# Patient Record
Sex: FEMALE | Race: WHITE | NOT HISPANIC OR LATINO | URBAN - METROPOLITAN AREA
[De-identification: names, ages, dates, MRNs, and addresses within clinical notes are randomized per-mention and may not be internally consistent; named-entity substitution may affect disease eponyms.]

---

## 2017-01-02 ENCOUNTER — EMERGENCY (EMERGENCY)
Facility: HOSPITAL | Age: 82
LOS: 0 days | Discharge: LEFT BEFORE DISCHARGE | End: 2017-01-02
Admitting: EMERGENCY MEDICINE
Payer: MEDICARE

## 2017-01-02 DIAGNOSIS — I10 ESSENTIAL (PRIMARY) HYPERTENSION: ICD-10-CM

## 2017-01-02 DIAGNOSIS — E78.5 HYPERLIPIDEMIA, UNSPECIFIED: ICD-10-CM

## 2017-01-02 DIAGNOSIS — G50.0 TRIGEMINAL NEURALGIA: ICD-10-CM

## 2017-01-02 PROCEDURE — 99283 EMERGENCY DEPT VISIT LOW MDM: CPT

## 2017-10-03 ENCOUNTER — EMERGENCY (EMERGENCY)
Facility: HOSPITAL | Age: 82
LOS: 0 days | Discharge: HOME | End: 2017-10-03
Attending: EMERGENCY MEDICINE | Admitting: EMERGENCY MEDICINE
Payer: MEDICARE

## 2017-10-03 VITALS
OXYGEN SATURATION: 97 % | DIASTOLIC BLOOD PRESSURE: 107 MMHG | HEART RATE: 71 BPM | TEMPERATURE: 98 F | RESPIRATION RATE: 18 BRPM | SYSTOLIC BLOOD PRESSURE: 202 MMHG | WEIGHT: 115.08 LBS

## 2017-10-03 VITALS — HEART RATE: 67 BPM | DIASTOLIC BLOOD PRESSURE: 91 MMHG | SYSTOLIC BLOOD PRESSURE: 138 MMHG

## 2017-10-03 DIAGNOSIS — Z79.02 LONG TERM (CURRENT) USE OF ANTITHROMBOTICS/ANTIPLATELETS: ICD-10-CM

## 2017-10-03 DIAGNOSIS — Z86.73 PERSONAL HISTORY OF TRANSIENT ISCHEMIC ATTACK (TIA), AND CEREBRAL INFARCTION WITHOUT RESIDUAL DEFICITS: ICD-10-CM

## 2017-10-03 DIAGNOSIS — I10 ESSENTIAL (PRIMARY) HYPERTENSION: ICD-10-CM

## 2017-10-03 DIAGNOSIS — Z88.2 ALLERGY STATUS TO SULFONAMIDES: ICD-10-CM

## 2017-10-03 PROCEDURE — 99284 EMERGENCY DEPT VISIT MOD MDM: CPT

## 2017-10-03 RX ADMIN — Medication 0.1 MILLIGRAM(S): at 15:18

## 2017-10-03 NOTE — ED STATDOCS - PROGRESS NOTE DETAILS
signed Jaycee Del Cid PA-C Pt seen initially in intake by Dr Awad.   Pt sent from dentist for asymptomatic HTN. pt has hx HTN, took her am meds (toprol XL 25 QD, Atacand 8mg BID). Spoke to PMJESIKA Luu whom she saw last week in office, BP was 138 systolic. Dr luu suspects pt may sometimes forget her medications. Will see pt in office tomorrow, give pt 1 dose of meds in ED, then DC. Discussed with pt and daughter, pt feels she remembers to take all of her meds, will f/u with Dr Luu tomorrow.

## 2017-10-03 NOTE — ED ADULT TRIAGE NOTE - CHIEF COMPLAINT QUOTE
Pt BIBA from dentist office for hypertension. Per EMS, pt's BP at dentist was as high as 222/112. Pt denies headache, vomiting, vision changes.
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
## 2017-10-03 NOTE — ED STATDOCS - ATTENDING CONTRIBUTION TO CARE
I, Hesham Awad, performed the initial face to face bedside interview with this patient regarding history of present illness, review of symptoms and relevant past medical, social and family history.  I completed an independent physical examination.  I was the initial provider who evaluated this patient. I have signed out the follow up of any pending tests (i.e. labs, radiological studies) to the ACP.  I have communicated the patient’s plan of care and disposition with the ACP.  The history, relevant review of systems, past medical and surgical history, medical decision making, and physical examination was documented by the scribe in my presence and I attest to the accuracy of the documentation.

## 2017-10-03 NOTE — ED STATDOCS - ENMT, MLM
(+) swelling to right lower gums.  Nasal mucosa clear.  Mouth with normal mucosa  Throat has no vesicles, no oropharyngeal exudates and uvula is midline.

## 2018-03-30 ENCOUNTER — APPOINTMENT (OUTPATIENT)
Dept: NEUROLOGY | Facility: CLINIC | Age: 83
End: 2018-03-30
Payer: MEDICARE

## 2018-03-30 VITALS
DIASTOLIC BLOOD PRESSURE: 74 MMHG | WEIGHT: 115 LBS | HEART RATE: 66 BPM | SYSTOLIC BLOOD PRESSURE: 144 MMHG | HEIGHT: 63 IN | BODY MASS INDEX: 20.38 KG/M2

## 2018-03-30 DIAGNOSIS — Z87.891 PERSONAL HISTORY OF NICOTINE DEPENDENCE: ICD-10-CM

## 2018-03-30 DIAGNOSIS — Z86.39 PERSONAL HISTORY OF OTHER ENDOCRINE, NUTRITIONAL AND METABOLIC DISEASE: ICD-10-CM

## 2018-03-30 DIAGNOSIS — Z87.09 PERSONAL HISTORY OF OTHER DISEASES OF THE RESPIRATORY SYSTEM: ICD-10-CM

## 2018-03-30 DIAGNOSIS — Z80.0 FAMILY HISTORY OF MALIGNANT NEOPLASM OF DIGESTIVE ORGANS: ICD-10-CM

## 2018-03-30 DIAGNOSIS — Z86.79 PERSONAL HISTORY OF OTHER DISEASES OF THE CIRCULATORY SYSTEM: ICD-10-CM

## 2018-03-30 DIAGNOSIS — Z82.0 FAMILY HISTORY OF EPILEPSY AND OTHER DISEASES OF THE NERVOUS SYSTEM: ICD-10-CM

## 2018-03-30 DIAGNOSIS — Z82.49 FAMILY HISTORY OF ISCHEMIC HEART DISEASE AND OTHER DISEASES OF THE CIRCULATORY SYSTEM: ICD-10-CM

## 2018-03-30 PROCEDURE — 99204 OFFICE O/P NEW MOD 45 MIN: CPT

## 2018-03-30 RX ORDER — KETOCONAZOLE 20 MG/G
2 CREAM TOPICAL
Qty: 60 | Refills: 0 | Status: COMPLETED | COMMUNITY
Start: 2018-01-02

## 2018-03-30 RX ORDER — BESIFLOXACIN 6 MG/ML
0.6 SUSPENSION OPHTHALMIC
Qty: 5 | Refills: 0 | Status: COMPLETED | COMMUNITY
Start: 2018-03-12

## 2018-03-30 RX ORDER — ATORVASTATIN CALCIUM 20 MG/1
20 TABLET, FILM COATED ORAL
Qty: 90 | Refills: 0 | Status: COMPLETED | COMMUNITY
Start: 2017-12-30

## 2018-03-30 RX ORDER — DIFLUPREDNATE 0.5 MG/ML
0.05 EMULSION OPHTHALMIC
Qty: 5 | Refills: 0 | Status: COMPLETED | COMMUNITY
Start: 2018-02-26

## 2018-03-30 RX ORDER — AMOXICILLIN AND CLAVULANATE POTASSIUM 875; 125 MG/1; MG/1
875-125 TABLET, COATED ORAL
Qty: 14 | Refills: 0 | Status: COMPLETED | COMMUNITY
Start: 2017-10-03

## 2018-03-30 RX ORDER — LEVOTHYROXINE SODIUM 0.07 MG/1
75 TABLET ORAL
Qty: 90 | Refills: 0 | Status: COMPLETED | COMMUNITY
Start: 2017-11-22

## 2018-04-04 ENCOUNTER — APPOINTMENT (OUTPATIENT)
Dept: NEUROLOGY | Facility: CLINIC | Age: 83
End: 2018-04-04

## 2018-04-05 ENCOUNTER — APPOINTMENT (OUTPATIENT)
Dept: NEUROLOGY | Facility: CLINIC | Age: 83
End: 2018-04-05

## 2018-04-05 ENCOUNTER — APPOINTMENT (OUTPATIENT)
Dept: NEUROLOGY | Facility: CLINIC | Age: 83
End: 2018-04-05
Payer: MEDICARE

## 2018-04-05 PROCEDURE — 95816 EEG AWAKE AND DROWSY: CPT

## 2018-04-13 ENCOUNTER — INPATIENT (INPATIENT)
Facility: HOSPITAL | Age: 83
LOS: 1 days | Discharge: HOME | End: 2018-04-15
Attending: SURGERY | Admitting: SURGERY
Payer: COMMERCIAL

## 2018-04-13 ENCOUNTER — APPOINTMENT (OUTPATIENT)
Dept: NEUROLOGY | Facility: CLINIC | Age: 83
End: 2018-04-13
Payer: MEDICARE

## 2018-04-13 VITALS
HEART RATE: 64 BPM | WEIGHT: 115 LBS | HEIGHT: 63 IN | SYSTOLIC BLOOD PRESSURE: 142 MMHG | BODY MASS INDEX: 20.38 KG/M2 | DIASTOLIC BLOOD PRESSURE: 78 MMHG

## 2018-04-13 VITALS — DIASTOLIC BLOOD PRESSURE: 48 MMHG | SYSTOLIC BLOOD PRESSURE: 86 MMHG | HEART RATE: 72 BPM
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
## 2018-04-13 DIAGNOSIS — R41.0 DISORIENTATION, UNSPECIFIED: ICD-10-CM

## 2018-04-13 DIAGNOSIS — R47.9 UNSPECIFIED SPEECH DISTURBANCES: ICD-10-CM

## 2018-04-13 DIAGNOSIS — G50.0 TRIGEMINAL NEURALGIA: ICD-10-CM

## 2018-04-13 DIAGNOSIS — I10 ESSENTIAL (PRIMARY) HYPERTENSION: ICD-10-CM

## 2018-04-13 DIAGNOSIS — R51 HEADACHE: ICD-10-CM

## 2018-04-13 DIAGNOSIS — S22.22XA FRACTURE OF BODY OF STERNUM, INITIAL ENCOUNTER FOR CLOSED FRACTURE: ICD-10-CM

## 2018-04-13 DIAGNOSIS — R41.3 OTHER AMNESIA: ICD-10-CM

## 2018-04-13 DIAGNOSIS — G45.1 CAROTID ARTERY SYNDROME (HEMISPHERIC): ICD-10-CM

## 2018-04-13 LAB
ALBUMIN SERPL ELPH-MCNC: 3.6 G/DL — SIGNIFICANT CHANGE UP (ref 3.3–5)
ALP SERPL-CCNC: 81 U/L — SIGNIFICANT CHANGE UP (ref 40–120)
ALT FLD-CCNC: 22 U/L — SIGNIFICANT CHANGE UP (ref 12–78)
ANION GAP SERPL CALC-SCNC: 10 MMOL/L — SIGNIFICANT CHANGE UP (ref 5–17)
APTT BLD: 33.1 SEC — SIGNIFICANT CHANGE UP (ref 27.5–37.4)
AST SERPL-CCNC: 18 U/L — SIGNIFICANT CHANGE UP (ref 15–37)
BASOPHILS # BLD AUTO: 0.1 K/UL — SIGNIFICANT CHANGE UP (ref 0–0.2)
BASOPHILS NFR BLD AUTO: 1.1 % — SIGNIFICANT CHANGE UP (ref 0–2)
BILIRUB SERPL-MCNC: 0.4 MG/DL — SIGNIFICANT CHANGE UP (ref 0.2–1.2)
BLD GP AB SCN SERPL QL: SIGNIFICANT CHANGE UP
BUN SERPL-MCNC: 21 MG/DL — SIGNIFICANT CHANGE UP (ref 7–23)
CALCIUM SERPL-MCNC: 8.6 MG/DL — SIGNIFICANT CHANGE UP (ref 8.5–10.1)
CHLORIDE SERPL-SCNC: 89 MMOL/L — LOW (ref 96–108)
CO2 SERPL-SCNC: 29 MMOL/L — SIGNIFICANT CHANGE UP (ref 22–31)
CREAT SERPL-MCNC: 1 MG/DL — SIGNIFICANT CHANGE UP (ref 0.5–1.3)
EOSINOPHIL # BLD AUTO: 0.1 K/UL — SIGNIFICANT CHANGE UP (ref 0–0.5)
EOSINOPHIL NFR BLD AUTO: 1.1 % — SIGNIFICANT CHANGE UP (ref 0–6)
GLUCOSE SERPL-MCNC: 150 MG/DL — HIGH (ref 70–99)
HCT VFR BLD CALC: 35.9 % — SIGNIFICANT CHANGE UP (ref 34.5–45)
HGB BLD-MCNC: 12.4 G/DL — SIGNIFICANT CHANGE UP (ref 11.5–15.5)
IMM GRANULOCYTES NFR BLD AUTO: 1.3 % — SIGNIFICANT CHANGE UP (ref 0–1.5)
INR BLD: 0.96 RATIO — SIGNIFICANT CHANGE UP (ref 0.88–1.16)
LYMPHOCYTES # BLD AUTO: 1.27 K/UL — SIGNIFICANT CHANGE UP (ref 1–3.3)
LYMPHOCYTES # BLD AUTO: 14.1 % — SIGNIFICANT CHANGE UP (ref 13–44)
MCHC RBC-ENTMCNC: 29.9 PG — SIGNIFICANT CHANGE UP (ref 27–34)
MCHC RBC-ENTMCNC: 34.5 GM/DL — SIGNIFICANT CHANGE UP (ref 32–36)
MCV RBC AUTO: 86.5 FL — SIGNIFICANT CHANGE UP (ref 80–100)
MONOCYTES # BLD AUTO: 0.79 K/UL — SIGNIFICANT CHANGE UP (ref 0–0.9)
MONOCYTES NFR BLD AUTO: 8.8 % — SIGNIFICANT CHANGE UP (ref 2–14)
NEUTROPHILS # BLD AUTO: 6.64 K/UL — SIGNIFICANT CHANGE UP (ref 1.8–7.4)
NEUTROPHILS NFR BLD AUTO: 73.6 % — SIGNIFICANT CHANGE UP (ref 43–77)
NRBC # BLD: 0 /100 WBCS — SIGNIFICANT CHANGE UP (ref 0–0)
PLATELET # BLD AUTO: 316 K/UL — SIGNIFICANT CHANGE UP (ref 150–400)
POTASSIUM SERPL-MCNC: 3.2 MMOL/L — LOW (ref 3.5–5.3)
POTASSIUM SERPL-SCNC: 3.2 MMOL/L — LOW (ref 3.5–5.3)
PROT SERPL-MCNC: 6.5 GM/DL — SIGNIFICANT CHANGE UP (ref 6–8.3)
PROTHROM AB SERPL-ACNC: 10.4 SEC — SIGNIFICANT CHANGE UP (ref 9.8–12.7)
RBC # BLD: 4.15 M/UL — SIGNIFICANT CHANGE UP (ref 3.8–5.2)
RBC # FLD: 12.9 % — SIGNIFICANT CHANGE UP (ref 10.3–14.5)
SODIUM SERPL-SCNC: 128 MMOL/L — LOW (ref 135–145)
TROPONIN I SERPL-MCNC: <0.015 NG/ML — SIGNIFICANT CHANGE UP (ref 0.01–0.04)
TYPE + AB SCN PNL BLD: SIGNIFICANT CHANGE UP
WBC # BLD: 9.02 K/UL — SIGNIFICANT CHANGE UP (ref 3.8–10.5)
WBC # FLD AUTO: 9.02 K/UL — SIGNIFICANT CHANGE UP (ref 3.8–10.5)

## 2018-04-13 PROCEDURE — 99285 EMERGENCY DEPT VISIT HI MDM: CPT

## 2018-04-13 PROCEDURE — 71250 CT THORAX DX C-: CPT | Mod: 26

## 2018-04-13 PROCEDURE — 93010 ELECTROCARDIOGRAM REPORT: CPT

## 2018-04-13 PROCEDURE — 99214 OFFICE O/P EST MOD 30 MIN: CPT

## 2018-04-13 RX ORDER — POTASSIUM CHLORIDE 20 MEQ
20 PACKET (EA) ORAL
Qty: 0 | Refills: 0 | Status: COMPLETED | OUTPATIENT
Start: 2018-04-13 | End: 2018-04-13

## 2018-04-13 RX ORDER — LOSARTAN POTASSIUM 100 MG/1
25 TABLET, FILM COATED ORAL
Qty: 0 | Refills: 0 | Status: DISCONTINUED | OUTPATIENT
Start: 2018-04-13 | End: 2018-04-15

## 2018-04-13 RX ORDER — ATORVASTATIN CALCIUM 80 MG/1
20 TABLET, FILM COATED ORAL AT BEDTIME
Qty: 0 | Refills: 0 | Status: DISCONTINUED | OUTPATIENT
Start: 2018-04-13 | End: 2018-04-15

## 2018-04-13 RX ORDER — ASPIRIN/CALCIUM CARB/MAGNESIUM 324 MG
325 TABLET ORAL DAILY
Qty: 0 | Refills: 0 | Status: DISCONTINUED | OUTPATIENT
Start: 2018-04-13 | End: 2018-04-15

## 2018-04-13 RX ORDER — SODIUM CHLORIDE 9 MG/ML
3 INJECTION INTRAMUSCULAR; INTRAVENOUS; SUBCUTANEOUS EVERY 8 HOURS
Qty: 0 | Refills: 0 | Status: DISCONTINUED | OUTPATIENT
Start: 2018-04-13 | End: 2018-04-15

## 2018-04-13 RX ORDER — LEVOTHYROXINE SODIUM 125 MCG
1 TABLET ORAL
Qty: 0 | Refills: 0 | COMMUNITY

## 2018-04-13 RX ORDER — ATORVASTATIN CALCIUM 80 MG/1
1 TABLET, FILM COATED ORAL
Qty: 0 | Refills: 0 | COMMUNITY

## 2018-04-13 RX ORDER — SODIUM CHLORIDE 9 MG/ML
1000 INJECTION INTRAMUSCULAR; INTRAVENOUS; SUBCUTANEOUS ONCE
Qty: 0 | Refills: 0 | Status: COMPLETED | OUTPATIENT
Start: 2018-04-13 | End: 2018-04-13

## 2018-04-13 RX ORDER — HYDROMORPHONE HYDROCHLORIDE 2 MG/ML
0.5 INJECTION INTRAMUSCULAR; INTRAVENOUS; SUBCUTANEOUS EVERY 6 HOURS
Qty: 0 | Refills: 0 | Status: DISCONTINUED | OUTPATIENT
Start: 2018-04-13 | End: 2018-04-15

## 2018-04-13 RX ORDER — CHLORTHALIDONE 50 MG
1 TABLET ORAL
Qty: 0 | Refills: 0 | COMMUNITY

## 2018-04-13 RX ORDER — ONDANSETRON 8 MG/1
4 TABLET, FILM COATED ORAL EVERY 6 HOURS
Qty: 0 | Refills: 0 | Status: DISCONTINUED | OUTPATIENT
Start: 2018-04-13 | End: 2018-04-15

## 2018-04-13 RX ORDER — LEVOTHYROXINE SODIUM 125 MCG
75 TABLET ORAL DAILY
Qty: 0 | Refills: 0 | Status: DISCONTINUED | OUTPATIENT
Start: 2018-04-13 | End: 2018-04-15

## 2018-04-13 RX ORDER — BESIFLOXACIN 6 MG/ML
1 SUSPENSION OPHTHALMIC
Qty: 0 | Refills: 0 | COMMUNITY

## 2018-04-13 RX ORDER — DUREZOL 0.5 MG/ML
1 EMULSION OPHTHALMIC
Qty: 0 | Refills: 0 | COMMUNITY

## 2018-04-13 RX ORDER — ENOXAPARIN SODIUM 100 MG/ML
40 INJECTION SUBCUTANEOUS DAILY
Qty: 0 | Refills: 0 | Status: DISCONTINUED | OUTPATIENT
Start: 2018-04-14 | End: 2018-04-15

## 2018-04-13 RX ORDER — METOPROLOL TARTRATE 50 MG
25 TABLET ORAL DAILY
Qty: 0 | Refills: 0 | Status: DISCONTINUED | OUTPATIENT
Start: 2018-04-13 | End: 2018-04-15

## 2018-04-13 RX ORDER — LIDOCAINE 4 G/100G
1 CREAM TOPICAL DAILY
Qty: 0 | Refills: 0 | Status: DISCONTINUED | OUTPATIENT
Start: 2018-04-13 | End: 2018-04-15

## 2018-04-13 RX ORDER — OXYCODONE HYDROCHLORIDE 5 MG/1
5 TABLET ORAL ONCE
Qty: 0 | Refills: 0 | Status: DISCONTINUED | OUTPATIENT
Start: 2018-04-13 | End: 2018-04-13

## 2018-04-13 RX ORDER — OXYCODONE HYDROCHLORIDE 5 MG/1
5 TABLET ORAL EVERY 4 HOURS
Qty: 0 | Refills: 0 | Status: DISCONTINUED | OUTPATIENT
Start: 2018-04-13 | End: 2018-04-15

## 2018-04-13 RX ORDER — CARBAMAZEPINE 200 MG
100 TABLET ORAL THREE TIMES A DAY
Qty: 0 | Refills: 0 | Status: DISCONTINUED | OUTPATIENT
Start: 2018-04-13 | End: 2018-04-15

## 2018-04-13 RX ORDER — ACETAMINOPHEN 500 MG
650 TABLET ORAL EVERY 6 HOURS
Qty: 0 | Refills: 0 | Status: DISCONTINUED | OUTPATIENT
Start: 2018-04-13 | End: 2018-04-15

## 2018-04-13 RX ADMIN — SODIUM CHLORIDE 1000 MILLILITER(S): 9 INJECTION INTRAMUSCULAR; INTRAVENOUS; SUBCUTANEOUS at 17:15

## 2018-04-13 RX ADMIN — SODIUM CHLORIDE 3 MILLILITER(S): 9 INJECTION INTRAMUSCULAR; INTRAVENOUS; SUBCUTANEOUS at 22:42

## 2018-04-13 RX ADMIN — ATORVASTATIN CALCIUM 20 MILLIGRAM(S): 80 TABLET, FILM COATED ORAL at 23:37

## 2018-04-13 RX ADMIN — Medication 25 MILLIGRAM(S): at 23:37

## 2018-04-13 RX ADMIN — Medication 20 MILLIEQUIVALENT(S): at 19:43

## 2018-04-13 RX ADMIN — Medication 650 MILLIGRAM(S): at 22:41

## 2018-04-13 RX ADMIN — Medication 20 MILLIEQUIVALENT(S): at 22:42

## 2018-04-13 RX ADMIN — LOSARTAN POTASSIUM 25 MILLIGRAM(S): 100 TABLET, FILM COATED ORAL at 23:37

## 2018-04-13 RX ADMIN — LIDOCAINE 1 PATCH: 4 CREAM TOPICAL at 23:37

## 2018-04-13 RX ADMIN — OXYCODONE HYDROCHLORIDE 5 MILLIGRAM(S): 5 TABLET ORAL at 22:41

## 2018-04-13 RX ADMIN — OXYCODONE HYDROCHLORIDE 5 MILLIGRAM(S): 5 TABLET ORAL at 16:24
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
## 2018-04-13 NOTE — H&P ADULT - RS GEN PE MLT RESP DETAILS PC
airway patent/good air movement/no intercostal retractions/chest wall tenderness/breath sounds equal/respirations non-labored
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
## 2018-04-13 NOTE — ED PROVIDER NOTE - ENMT, MLM
Airway patent, Nasal mucosa clear. Mouth with normal mucosa. Throat has no vesicles, no oropharyngeal exudates and uvula is midline. NC/AT.

## 2018-04-14 LAB
ABO RH CONFIRMATION: SIGNIFICANT CHANGE UP
ANION GAP SERPL CALC-SCNC: 10 MMOL/L — SIGNIFICANT CHANGE UP (ref 5–17)
BUN SERPL-MCNC: 15 MG/DL — SIGNIFICANT CHANGE UP (ref 7–23)
CALCIUM SERPL-MCNC: 8.7 MG/DL — SIGNIFICANT CHANGE UP (ref 8.5–10.1)
CHLORIDE SERPL-SCNC: 92 MMOL/L — LOW (ref 96–108)
CO2 SERPL-SCNC: 26 MMOL/L — SIGNIFICANT CHANGE UP (ref 22–31)
CREAT SERPL-MCNC: 0.69 MG/DL — SIGNIFICANT CHANGE UP (ref 0.5–1.3)
GLUCOSE SERPL-MCNC: 91 MG/DL — SIGNIFICANT CHANGE UP (ref 70–99)
POTASSIUM SERPL-MCNC: 3.8 MMOL/L — SIGNIFICANT CHANGE UP (ref 3.5–5.3)
POTASSIUM SERPL-SCNC: 3.8 MMOL/L — SIGNIFICANT CHANGE UP (ref 3.5–5.3)
SODIUM SERPL-SCNC: 128 MMOL/L — LOW (ref 135–145)

## 2018-04-14 PROCEDURE — 93306 TTE W/DOPPLER COMPLETE: CPT | Mod: 26

## 2018-04-14 PROCEDURE — 93010 ELECTROCARDIOGRAM REPORT: CPT

## 2018-04-14 PROCEDURE — 71045 X-RAY EXAM CHEST 1 VIEW: CPT | Mod: 26

## 2018-04-14 RX ORDER — IBUPROFEN 200 MG
400 TABLET ORAL EVERY 8 HOURS
Qty: 0 | Refills: 0 | Status: DISCONTINUED | OUTPATIENT
Start: 2018-04-14 | End: 2018-04-14

## 2018-04-14 RX ORDER — SODIUM CHLORIDE 9 MG/ML
1000 INJECTION INTRAMUSCULAR; INTRAVENOUS; SUBCUTANEOUS
Qty: 0 | Refills: 0 | Status: DISCONTINUED | OUTPATIENT
Start: 2018-04-14 | End: 2018-04-15

## 2018-04-14 RX ADMIN — OXYCODONE HYDROCHLORIDE 5 MILLIGRAM(S): 5 TABLET ORAL at 01:32

## 2018-04-14 RX ADMIN — LOSARTAN POTASSIUM 25 MILLIGRAM(S): 100 TABLET, FILM COATED ORAL at 17:37

## 2018-04-14 RX ADMIN — LIDOCAINE 1 PATCH: 4 CREAM TOPICAL at 12:36

## 2018-04-14 RX ADMIN — OXYCODONE HYDROCHLORIDE 5 MILLIGRAM(S): 5 TABLET ORAL at 12:36

## 2018-04-14 RX ADMIN — Medication 100 MILLIGRAM(S): at 13:44

## 2018-04-14 RX ADMIN — Medication 100 MILLIGRAM(S): at 06:25

## 2018-04-14 RX ADMIN — LOSARTAN POTASSIUM 25 MILLIGRAM(S): 100 TABLET, FILM COATED ORAL at 05:55

## 2018-04-14 RX ADMIN — SODIUM CHLORIDE 50 MILLILITER(S): 9 INJECTION INTRAMUSCULAR; INTRAVENOUS; SUBCUTANEOUS at 17:37

## 2018-04-14 RX ADMIN — Medication 75 MICROGRAM(S): at 05:55

## 2018-04-14 RX ADMIN — Medication 100 MILLIGRAM(S): at 21:29

## 2018-04-14 RX ADMIN — ENOXAPARIN SODIUM 40 MILLIGRAM(S): 100 INJECTION SUBCUTANEOUS at 12:36

## 2018-04-14 RX ADMIN — SODIUM CHLORIDE 3 MILLILITER(S): 9 INJECTION INTRAMUSCULAR; INTRAVENOUS; SUBCUTANEOUS at 21:31

## 2018-04-14 RX ADMIN — Medication 325 MILLIGRAM(S): at 12:36

## 2018-04-14 RX ADMIN — SODIUM CHLORIDE 3 MILLILITER(S): 9 INJECTION INTRAMUSCULAR; INTRAVENOUS; SUBCUTANEOUS at 13:43

## 2018-04-14 RX ADMIN — Medication 25 MILLIGRAM(S): at 05:55

## 2018-04-14 RX ADMIN — HYDROMORPHONE HYDROCHLORIDE 0.5 MILLIGRAM(S): 2 INJECTION INTRAMUSCULAR; INTRAVENOUS; SUBCUTANEOUS at 10:30

## 2018-04-14 RX ADMIN — LIDOCAINE 1 PATCH: 4 CREAM TOPICAL at 12:33

## 2018-04-14 RX ADMIN — OXYCODONE HYDROCHLORIDE 5 MILLIGRAM(S): 5 TABLET ORAL at 21:31

## 2018-04-14 RX ADMIN — HYDROMORPHONE HYDROCHLORIDE 0.5 MILLIGRAM(S): 2 INJECTION INTRAMUSCULAR; INTRAVENOUS; SUBCUTANEOUS at 09:36

## 2018-04-14 RX ADMIN — SODIUM CHLORIDE 3 MILLILITER(S): 9 INJECTION INTRAMUSCULAR; INTRAVENOUS; SUBCUTANEOUS at 05:59

## 2018-04-14 RX ADMIN — ATORVASTATIN CALCIUM 20 MILLIGRAM(S): 80 TABLET, FILM COATED ORAL at 21:29

## 2018-04-14 NOTE — PATIENT PROFILE ADULT. - PRO ANTICIPATED DISCH DISP
received pt to intake 7 for evaluation of dizziness and "room spinning" since yesterday. pt also reports feeling "like I am falling forward" with nausea and sinus/head pressure. pt also endorses "my left ear feels weird". pt presents awake a&ox4, denies visual disturbances. + dizziness. speech clear, neuro/sensory intact, dixon. skin warm, dry, intact, appropriate for race. respirations even unlabored. denies any additional symptoms at this time. ivl placed. bloods drawn and sent. NS 0.9% bolus infusing. pt medicated with reglan as ordered. family at bedside. will continue to observe home
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
## 2018-04-14 NOTE — PHYSICAL THERAPY INITIAL EVALUATION ADULT - GENERAL OBSERVATIONS, REHAB EVAL
pt rec'd supine in bed on 3E, +HM. c/o sternal pain-pain patch in place, pt reluctant to request pain meds

## 2018-04-14 NOTE — PHYSICAL THERAPY INITIAL EVALUATION ADULT - CRITERIA FOR SKILLED THERAPEUTIC INTERVENTIONS
anticipated discharge recommendation/impairments found/anticipated equipment needs at discharge/rehab potential/risk reduction/prevention/therapy frequency

## 2018-04-15 ENCOUNTER — TRANSCRIPTION ENCOUNTER (OUTPATIENT)
Age: 83
End: 2018-04-15

## 2018-04-15 VITALS
SYSTOLIC BLOOD PRESSURE: 169 MMHG | TEMPERATURE: 98 F | RESPIRATION RATE: 18 BRPM | OXYGEN SATURATION: 95 % | DIASTOLIC BLOOD PRESSURE: 82 MMHG | HEART RATE: 71 BPM

## 2018-04-15 LAB
ANION GAP SERPL CALC-SCNC: 6 MMOL/L — SIGNIFICANT CHANGE UP (ref 5–17)
BUN SERPL-MCNC: 16 MG/DL — SIGNIFICANT CHANGE UP (ref 7–23)
CALCIUM SERPL-MCNC: 8.8 MG/DL — SIGNIFICANT CHANGE UP (ref 8.5–10.1)
CHLORIDE SERPL-SCNC: 91 MMOL/L — LOW (ref 96–108)
CO2 SERPL-SCNC: 30 MMOL/L — SIGNIFICANT CHANGE UP (ref 22–31)
CREAT SERPL-MCNC: 0.72 MG/DL — SIGNIFICANT CHANGE UP (ref 0.5–1.3)
GLUCOSE SERPL-MCNC: 150 MG/DL — HIGH (ref 70–99)
POTASSIUM SERPL-MCNC: 4.2 MMOL/L — SIGNIFICANT CHANGE UP (ref 3.5–5.3)
POTASSIUM SERPL-SCNC: 4.2 MMOL/L — SIGNIFICANT CHANGE UP (ref 3.5–5.3)
SODIUM SERPL-SCNC: 127 MMOL/L — LOW (ref 135–145)

## 2018-04-15 RX ORDER — OXYCODONE HYDROCHLORIDE 5 MG/1
1 TABLET ORAL
Qty: 24 | Refills: 0
Start: 2018-04-15 | End: 2018-04-18

## 2018-04-15 RX ORDER — ACETAMINOPHEN 500 MG
2 TABLET ORAL
Qty: 0 | Refills: 0 | DISCHARGE
Start: 2018-04-15

## 2018-04-15 RX ORDER — LOSARTAN POTASSIUM 100 MG/1
1 TABLET, FILM COATED ORAL
Qty: 0 | Refills: 0 | DISCHARGE
Start: 2018-04-15

## 2018-04-15 RX ADMIN — Medication 100 MILLIGRAM(S): at 05:16

## 2018-04-15 RX ADMIN — Medication 25 MILLIGRAM(S): at 05:17

## 2018-04-15 RX ADMIN — Medication 75 MICROGRAM(S): at 05:17

## 2018-04-15 RX ADMIN — Medication 650 MILLIGRAM(S): at 06:00

## 2018-04-15 RX ADMIN — SODIUM CHLORIDE 3 MILLILITER(S): 9 INJECTION INTRAMUSCULAR; INTRAVENOUS; SUBCUTANEOUS at 05:16

## 2018-04-15 RX ADMIN — ENOXAPARIN SODIUM 40 MILLIGRAM(S): 100 INJECTION SUBCUTANEOUS at 12:03

## 2018-04-15 RX ADMIN — Medication 325 MILLIGRAM(S): at 12:03

## 2018-04-15 RX ADMIN — LOSARTAN POTASSIUM 25 MILLIGRAM(S): 100 TABLET, FILM COATED ORAL at 05:17

## 2018-04-15 RX ADMIN — OXYCODONE HYDROCHLORIDE 5 MILLIGRAM(S): 5 TABLET ORAL at 06:00

## 2018-04-15 RX ADMIN — SODIUM CHLORIDE 3 MILLILITER(S): 9 INJECTION INTRAMUSCULAR; INTRAVENOUS; SUBCUTANEOUS at 13:19

## 2018-04-15 RX ADMIN — Medication 100 MILLIGRAM(S): at 13:18

## 2018-04-15 RX ADMIN — LIDOCAINE 1 PATCH: 4 CREAM TOPICAL at 12:03
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
## 2018-04-15 NOTE — DISCHARGE NOTE ADULT - CARE PROVIDER_API CALL
Chinedu Simons (DO), Surgery; Surgical Critical Care  [address] [facility], [place]  [address] [place] [address]  Phone: [phone]  Fax: [phone]

## 2018-04-15 NOTE — DISCHARGE NOTE ADULT - MEDICATION SUMMARY - MEDICATIONS TO TAKE
I will START or STAY ON the medications listed below when I get home from the hospital:    aspirin 325 mg oral tablet  -- 1 tab(s) by mouth once a day  -- Indication: For Essential hypertension    acetaminophen 325 mg oral tablet  -- 2 tab(s) by mouth every 6 hours, As needed, pain and temp  -- Indication: For Closed fracture of body of sternum, initial encounter    oxyCODONE 5 mg oral tablet  -- 1 tab(s) by mouth every 4 hours, As needed, Moderate Pain (4 - 6) MDD:6  -- Indication: For Closed fracture of body of sternum, initial encounter    losartan 25 mg oral tablet  -- 1 tab(s) by mouth 2 times a day  -- Indication: For Essential hypertension    candesartan 8 mg oral tablet  -- 1 tab(s) by mouth 2 times a day  -- Indication: For Essential hypertension    carBAMazepine 200 mg oral tablet  -- 0.5 tab(s) by mouth 3 times a day  -- Indication: For No significant past surgical history    atorvastatin 20 mg oral tablet  -- 1 tab(s) by mouth once a day (at bedtime)  -- Indication: For Essential hypertension    metoprolol succinate 25 mg oral tablet, extended release  -- 1 tab(s) by mouth once a day  -- Indication: For Essential hypertension    levothyroxine 75 mcg (0.075 mg) oral tablet  -- 1 tab(s) by mouth every other day (on odd days)  -- Indication: For hypothyroid
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
## 2018-04-17 DIAGNOSIS — E03.9 HYPOTHYROIDISM, UNSPECIFIED: ICD-10-CM

## 2018-04-17 DIAGNOSIS — Z79.899 OTHER LONG TERM (CURRENT) DRUG THERAPY: ICD-10-CM

## 2018-04-17 DIAGNOSIS — S22.20XA UNSPECIFIED FRACTURE OF STERNUM, INITIAL ENCOUNTER FOR CLOSED FRACTURE: ICD-10-CM

## 2018-04-17 DIAGNOSIS — R07.9 CHEST PAIN, UNSPECIFIED: ICD-10-CM

## 2018-04-17 DIAGNOSIS — E87.1 HYPO-OSMOLALITY AND HYPONATREMIA: ICD-10-CM

## 2018-04-17 DIAGNOSIS — G50.0 TRIGEMINAL NEURALGIA: ICD-10-CM
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
## 2018-04-17 DIAGNOSIS — E78.5 HYPERLIPIDEMIA, UNSPECIFIED: ICD-10-CM
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
## 2018-04-17 DIAGNOSIS — I10 ESSENTIAL (PRIMARY) HYPERTENSION: ICD-10-CM

## 2018-04-17 DIAGNOSIS — Z86.73 PERSONAL HISTORY OF TRANSIENT ISCHEMIC ATTACK (TIA), AND CEREBRAL INFARCTION WITHOUT RESIDUAL DEFICITS: ICD-10-CM

## 2018-05-18 ENCOUNTER — APPOINTMENT (OUTPATIENT)
Dept: NEUROLOGY | Facility: CLINIC | Age: 83
End: 2018-05-18

## 2019-12-09 NOTE — DISCHARGE NOTE ADULT - PRINCIPAL DIAGNOSIS
Sent critical results to Dr. Bergman and Huy Anders RN (patient's care coordinator).    Samina Dawn LPN     Closed fracture of body of sternum, initial encounter

## 2020-11-18 ENCOUNTER — OUTPATIENT (OUTPATIENT)
Dept: OUTPATIENT SERVICES | Facility: HOSPITAL | Age: 85
LOS: 1 days | End: 2020-11-18
Payer: MEDICARE

## 2020-11-18 DIAGNOSIS — Z11.59 ENCOUNTER FOR SCREENING FOR OTHER VIRAL DISEASES: ICD-10-CM

## 2020-11-18 PROBLEM — G45.9 TRANSIENT CEREBRAL ISCHEMIC ATTACK, UNSPECIFIED: Chronic | Status: ACTIVE | Noted: 2017-10-03

## 2020-11-18 PROBLEM — I10 ESSENTIAL (PRIMARY) HYPERTENSION: Chronic | Status: ACTIVE | Noted: 2017-10-03

## 2020-11-18 LAB — SARS-COV-2 RNA SPEC QL NAA+PROBE: SIGNIFICANT CHANGE UP

## 2020-11-18 PROCEDURE — U0003: CPT

## 2020-11-19 DIAGNOSIS — Z11.59 ENCOUNTER FOR SCREENING FOR OTHER VIRAL DISEASES: ICD-10-CM

## 2020-12-07 DIAGNOSIS — I10 ESSENTIAL (PRIMARY) HYPERTENSION: ICD-10-CM

## 2020-12-07 RX ORDER — CANDESARTAN CILEXETIL 8 MG/1
8 TABLET ORAL TWICE DAILY
Qty: 180 | Refills: 0 | Status: ACTIVE | COMMUNITY
Start: 1900-01-01 | End: 1900-01-01

## 2020-12-24 ENCOUNTER — INPATIENT (INPATIENT)
Facility: HOSPITAL | Age: 85
LOS: 3 days | Discharge: NURSING, REHAB OR LONG-TERM CARE | DRG: 312 | End: 2020-12-28
Attending: INTERNAL MEDICINE | Admitting: INTERNAL MEDICINE
Payer: MEDICARE

## 2020-12-24 VITALS
DIASTOLIC BLOOD PRESSURE: 75 MMHG | SYSTOLIC BLOOD PRESSURE: 121 MMHG | OXYGEN SATURATION: 94 % | HEART RATE: 70 BPM | HEIGHT: 63 IN | RESPIRATION RATE: 17 BRPM | TEMPERATURE: 97 F

## 2020-12-24 DIAGNOSIS — R55 SYNCOPE AND COLLAPSE: ICD-10-CM

## 2020-12-24 LAB
ALBUMIN SERPL ELPH-MCNC: 3.4 G/DL — SIGNIFICANT CHANGE UP (ref 3.3–5)
ALP SERPL-CCNC: 56 U/L — SIGNIFICANT CHANGE UP (ref 40–120)
ALT FLD-CCNC: 34 U/L — SIGNIFICANT CHANGE UP (ref 12–78)
ANION GAP SERPL CALC-SCNC: 6 MMOL/L — SIGNIFICANT CHANGE UP (ref 5–17)
AST SERPL-CCNC: 27 U/L — SIGNIFICANT CHANGE UP (ref 15–37)
BASOPHILS # BLD AUTO: 0.09 K/UL — SIGNIFICANT CHANGE UP (ref 0–0.2)
BASOPHILS NFR BLD AUTO: 0.9 % — SIGNIFICANT CHANGE UP (ref 0–2)
BILIRUB SERPL-MCNC: 0.3 MG/DL — SIGNIFICANT CHANGE UP (ref 0.2–1.2)
BUN SERPL-MCNC: 25 MG/DL — HIGH (ref 7–23)
CALCIUM SERPL-MCNC: 8.4 MG/DL — LOW (ref 8.5–10.1)
CHLORIDE SERPL-SCNC: 108 MMOL/L — SIGNIFICANT CHANGE UP (ref 96–108)
CO2 SERPL-SCNC: 25 MMOL/L — SIGNIFICANT CHANGE UP (ref 22–31)
CREAT SERPL-MCNC: 0.98 MG/DL — SIGNIFICANT CHANGE UP (ref 0.5–1.3)
EOSINOPHIL # BLD AUTO: 0.18 K/UL — SIGNIFICANT CHANGE UP (ref 0–0.5)
EOSINOPHIL NFR BLD AUTO: 1.8 % — SIGNIFICANT CHANGE UP (ref 0–6)
GLUCOSE SERPL-MCNC: 146 MG/DL — HIGH (ref 70–99)
HCT VFR BLD CALC: 36.7 % — SIGNIFICANT CHANGE UP (ref 34.5–45)
HGB BLD-MCNC: 11.8 G/DL — SIGNIFICANT CHANGE UP (ref 11.5–15.5)
IMM GRANULOCYTES NFR BLD AUTO: 1 % — SIGNIFICANT CHANGE UP (ref 0–1.5)
LYMPHOCYTES # BLD AUTO: 1.9 K/UL — SIGNIFICANT CHANGE UP (ref 1–3.3)
LYMPHOCYTES # BLD AUTO: 19.1 % — SIGNIFICANT CHANGE UP (ref 13–44)
MAGNESIUM SERPL-MCNC: 2.4 MG/DL — SIGNIFICANT CHANGE UP (ref 1.6–2.6)
MCHC RBC-ENTMCNC: 30.8 PG — SIGNIFICANT CHANGE UP (ref 27–34)
MCHC RBC-ENTMCNC: 32.2 GM/DL — SIGNIFICANT CHANGE UP (ref 32–36)
MCV RBC AUTO: 95.8 FL — SIGNIFICANT CHANGE UP (ref 80–100)
MONOCYTES # BLD AUTO: 0.81 K/UL — SIGNIFICANT CHANGE UP (ref 0–0.9)
MONOCYTES NFR BLD AUTO: 8.1 % — SIGNIFICANT CHANGE UP (ref 2–14)
NEUTROPHILS # BLD AUTO: 6.89 K/UL — SIGNIFICANT CHANGE UP (ref 1.8–7.4)
NEUTROPHILS NFR BLD AUTO: 69.1 % — SIGNIFICANT CHANGE UP (ref 43–77)
NT-PROBNP SERPL-SCNC: 347 PG/ML — SIGNIFICANT CHANGE UP (ref 0–450)
PLATELET # BLD AUTO: 202 K/UL — SIGNIFICANT CHANGE UP (ref 150–400)
POTASSIUM SERPL-MCNC: 4.2 MMOL/L — SIGNIFICANT CHANGE UP (ref 3.5–5.3)
POTASSIUM SERPL-SCNC: 4.2 MMOL/L — SIGNIFICANT CHANGE UP (ref 3.5–5.3)
PROT SERPL-MCNC: 6.7 GM/DL — SIGNIFICANT CHANGE UP (ref 6–8.3)
RBC # BLD: 3.83 M/UL — SIGNIFICANT CHANGE UP (ref 3.8–5.2)
RBC # FLD: 13.5 % — SIGNIFICANT CHANGE UP (ref 10.3–14.5)
SARS-COV-2 RNA SPEC QL NAA+PROBE: SIGNIFICANT CHANGE UP
SODIUM SERPL-SCNC: 139 MMOL/L — SIGNIFICANT CHANGE UP (ref 135–145)
TROPONIN I SERPL-MCNC: <0.015 NG/ML — SIGNIFICANT CHANGE UP (ref 0.01–0.04)
TROPONIN I SERPL-MCNC: <0.015 NG/ML — SIGNIFICANT CHANGE UP (ref 0.01–0.04)
WBC # BLD: 9.97 K/UL — SIGNIFICANT CHANGE UP (ref 3.8–10.5)
WBC # FLD AUTO: 9.97 K/UL — SIGNIFICANT CHANGE UP (ref 3.8–10.5)

## 2020-12-24 PROCEDURE — 86769 SARS-COV-2 COVID-19 ANTIBODY: CPT

## 2020-12-24 PROCEDURE — 82962 GLUCOSE BLOOD TEST: CPT

## 2020-12-24 PROCEDURE — 36415 COLL VENOUS BLD VENIPUNCTURE: CPT

## 2020-12-24 PROCEDURE — 83036 HEMOGLOBIN GLYCOSYLATED A1C: CPT

## 2020-12-24 PROCEDURE — 85018 HEMOGLOBIN: CPT

## 2020-12-24 PROCEDURE — 84443 ASSAY THYROID STIM HORMONE: CPT

## 2020-12-24 PROCEDURE — 84484 ASSAY OF TROPONIN QUANT: CPT

## 2020-12-24 PROCEDURE — 71045 X-RAY EXAM CHEST 1 VIEW: CPT | Mod: 26

## 2020-12-24 PROCEDURE — 97163 PT EVAL HIGH COMPLEX 45 MIN: CPT | Mod: GP

## 2020-12-24 PROCEDURE — 86923 COMPATIBILITY TEST ELECTRIC: CPT

## 2020-12-24 PROCEDURE — 85610 PROTHROMBIN TIME: CPT

## 2020-12-24 PROCEDURE — 85027 COMPLETE CBC AUTOMATED: CPT

## 2020-12-24 PROCEDURE — 70450 CT HEAD/BRAIN W/O DYE: CPT | Mod: 26

## 2020-12-24 PROCEDURE — 86850 RBC ANTIBODY SCREEN: CPT

## 2020-12-24 PROCEDURE — 71250 CT THORAX DX C-: CPT | Mod: 26

## 2020-12-24 PROCEDURE — 97116 GAIT TRAINING THERAPY: CPT | Mod: GP

## 2020-12-24 PROCEDURE — 84100 ASSAY OF PHOSPHORUS: CPT

## 2020-12-24 PROCEDURE — 85014 HEMATOCRIT: CPT

## 2020-12-24 PROCEDURE — 83735 ASSAY OF MAGNESIUM: CPT

## 2020-12-24 PROCEDURE — 84439 ASSAY OF FREE THYROXINE: CPT

## 2020-12-24 PROCEDURE — 93010 ELECTROCARDIOGRAM REPORT: CPT

## 2020-12-24 PROCEDURE — P9016: CPT

## 2020-12-24 PROCEDURE — U0003: CPT

## 2020-12-24 PROCEDURE — 86900 BLOOD TYPING SEROLOGIC ABO: CPT

## 2020-12-24 PROCEDURE — 86901 BLOOD TYPING SEROLOGIC RH(D): CPT

## 2020-12-24 PROCEDURE — 85730 THROMBOPLASTIN TIME PARTIAL: CPT

## 2020-12-24 PROCEDURE — 72125 CT NECK SPINE W/O DYE: CPT | Mod: 26

## 2020-12-24 PROCEDURE — 74176 CT ABD & PELVIS W/O CONTRAST: CPT | Mod: 26

## 2020-12-24 PROCEDURE — 36430 TRANSFUSION BLD/BLD COMPNT: CPT

## 2020-12-24 PROCEDURE — 93880 EXTRACRANIAL BILAT STUDY: CPT

## 2020-12-24 PROCEDURE — 93005 ELECTROCARDIOGRAM TRACING: CPT

## 2020-12-24 PROCEDURE — 80048 BASIC METABOLIC PNL TOTAL CA: CPT

## 2020-12-24 PROCEDURE — 87635 SARS-COV-2 COVID-19 AMP PRB: CPT

## 2020-12-24 PROCEDURE — 82607 VITAMIN B-12: CPT

## 2020-12-24 RX ORDER — METOPROLOL TARTRATE 50 MG
1 TABLET ORAL
Qty: 0 | Refills: 0 | DISCHARGE

## 2020-12-24 RX ORDER — ASPIRIN/CALCIUM CARB/MAGNESIUM 324 MG
1 TABLET ORAL
Qty: 0 | Refills: 0 | DISCHARGE

## 2020-12-24 RX ORDER — CARBAMAZEPINE 200 MG
0.5 TABLET ORAL
Qty: 0 | Refills: 0 | DISCHARGE

## 2020-12-24 NOTE — ED PROVIDER NOTE - NEUROLOGICAL, MLM
Alert and oriented, no focal deficits, no motor or sensory deficits. NIH 0. GCS 15. CN II-XII intact.

## 2020-12-24 NOTE — ED PROVIDER NOTE - CARDIAC, MLM
Normal rate, regular rhythm.  Heart sounds S1, S2. Normal rate, regular rhythm.  Heart sounds S1, S2. 2+ pulses bilateral dp and radial arteries.

## 2020-12-24 NOTE — ED ADULT NURSE NOTE - NSFALLRSKHARMRISK_ED_ALL_ED
----- Message from Gomez Mauricio sent at 12/3/2018  3:16 PM CST -----  Pt is requesting a call from nurse to get an order for a mammo gram          Please call pt back at [phone]   no
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
## 2020-12-24 NOTE — ED ADULT TRIAGE NOTE - CHIEF COMPLAINT QUOTE
Pt p/w c/o fall with head strike and +LOC.  Trauma alert called in triage.  pt a/ox3 with significant bleeding at the scene controlled with cling. trauma alert called. Pt p/w c/o fall with head strike and +LOC +ELiquis.  Trauma alert called in triage.  pt a/ox3 GCS 15 with significant bleeding at the scene controlled with cling.

## 2020-12-24 NOTE — ED PROVIDER NOTE - CONSTITUTIONAL, MLM
Well appearing, awake, alert, oriented to person, place, time/situation and in no apparent distress. Non-toxic appearing. normal...

## 2020-12-24 NOTE — ED PROVIDER NOTE - CLINICAL SUMMARY MEDICAL DECISION MAKING FREE TEXT BOX
Pt with syncope, Hx of TIA, HTN, unclear circumstances, no cardiologist and no previous cardiac workup. High risk for ACS. Will admit to rule out ACS.

## 2020-12-24 NOTE — ED ADULT NURSE NOTE - CHIEF COMPLAINT QUOTE
Pt p/w c/o fall with head strike and +LOC +ELiquis.  Trauma alert called in triage.  pt a/ox3 GCS 15 with significant bleeding at the scene controlled with cling.

## 2020-12-24 NOTE — ED PROVIDER NOTE - NS_ ATTENDINGSCRIBEDETAILS _ED_A_ED_FT
I Rikki Kiran MD saw and examined the patient. Scribe documented for me and under my supervision. I have modified the scribe's documentation where necessary to reflect my history, physical exam and other relevant documentations pertinent to the care of the patient.

## 2020-12-24 NOTE — ED PROVIDER NOTE - CARE PLAN
Principal Discharge DX:	Syncope, unspecified syncope type  Secondary Diagnosis:	Scalp hematoma, initial encounter  Secondary Diagnosis:	Closed head injury, initial encounter

## 2020-12-25 LAB
A1C WITH ESTIMATED AVERAGE GLUCOSE RESULT: 5.4 % — SIGNIFICANT CHANGE UP (ref 4–5.6)
ADD ON TEST-SPECIMEN IN LAB: SIGNIFICANT CHANGE UP
ANION GAP SERPL CALC-SCNC: 9 MMOL/L — SIGNIFICANT CHANGE UP (ref 5–17)
APTT BLD: 30.1 SEC — SIGNIFICANT CHANGE UP (ref 27.5–35.5)
BUN SERPL-MCNC: 29 MG/DL — HIGH (ref 7–23)
CALCIUM SERPL-MCNC: 8.1 MG/DL — LOW (ref 8.5–10.1)
CHLORIDE SERPL-SCNC: 107 MMOL/L — SIGNIFICANT CHANGE UP (ref 96–108)
CO2 SERPL-SCNC: 23 MMOL/L — SIGNIFICANT CHANGE UP (ref 22–31)
CREAT SERPL-MCNC: 1.24 MG/DL — SIGNIFICANT CHANGE UP (ref 0.5–1.3)
ESTIMATED AVERAGE GLUCOSE: 108 MG/DL — SIGNIFICANT CHANGE UP (ref 68–114)
GLUCOSE SERPL-MCNC: 243 MG/DL — HIGH (ref 70–99)
HCT VFR BLD CALC: 27.9 % — LOW (ref 34.5–45)
HGB BLD-MCNC: 9 G/DL — LOW (ref 11.5–15.5)
INR BLD: 1.08 RATIO — SIGNIFICANT CHANGE UP (ref 0.88–1.16)
MAGNESIUM SERPL-MCNC: 2.3 MG/DL — SIGNIFICANT CHANGE UP (ref 1.6–2.6)
MCHC RBC-ENTMCNC: 30.8 PG — SIGNIFICANT CHANGE UP (ref 27–34)
MCHC RBC-ENTMCNC: 32.3 GM/DL — SIGNIFICANT CHANGE UP (ref 32–36)
MCV RBC AUTO: 95.5 FL — SIGNIFICANT CHANGE UP (ref 80–100)
PHOSPHATE SERPL-MCNC: 4.5 MG/DL — SIGNIFICANT CHANGE UP (ref 2.5–4.5)
PLATELET # BLD AUTO: 196 K/UL — SIGNIFICANT CHANGE UP (ref 150–400)
POTASSIUM SERPL-MCNC: 4.9 MMOL/L — SIGNIFICANT CHANGE UP (ref 3.5–5.3)
POTASSIUM SERPL-SCNC: 4.9 MMOL/L — SIGNIFICANT CHANGE UP (ref 3.5–5.3)
PROTHROM AB SERPL-ACNC: 12.6 SEC — SIGNIFICANT CHANGE UP (ref 10.6–13.6)
RBC # BLD: 2.92 M/UL — LOW (ref 3.8–5.2)
RBC # FLD: 13.4 % — SIGNIFICANT CHANGE UP (ref 10.3–14.5)
SODIUM SERPL-SCNC: 139 MMOL/L — SIGNIFICANT CHANGE UP (ref 135–145)
TROPONIN I SERPL-MCNC: <0.015 NG/ML — SIGNIFICANT CHANGE UP (ref 0.01–0.04)
TSH SERPL-MCNC: 22.9 UU/ML — HIGH (ref 0.34–4.82)
VIT B12 SERPL-MCNC: 436 PG/ML — SIGNIFICANT CHANGE UP (ref 232–1245)
WBC # BLD: 13.76 K/UL — HIGH (ref 3.8–10.5)
WBC # FLD AUTO: 13.76 K/UL — HIGH (ref 3.8–10.5)

## 2020-12-25 PROCEDURE — 93880 EXTRACRANIAL BILAT STUDY: CPT | Mod: 26

## 2020-12-25 PROCEDURE — 12345: CPT | Mod: NC

## 2020-12-25 PROCEDURE — 99223 1ST HOSP IP/OBS HIGH 75: CPT | Mod: AI

## 2020-12-25 PROCEDURE — 93010 ELECTROCARDIOGRAM REPORT: CPT

## 2020-12-25 RX ORDER — CARBAMAZEPINE 200 MG
200 TABLET ORAL THREE TIMES A DAY
Refills: 0 | Status: DISCONTINUED | OUTPATIENT
Start: 2020-12-25 | End: 2020-12-28

## 2020-12-25 RX ORDER — INSULIN LISPRO 100/ML
VIAL (ML) SUBCUTANEOUS AT BEDTIME
Refills: 0 | Status: DISCONTINUED | OUTPATIENT
Start: 2020-12-25 | End: 2020-12-28

## 2020-12-25 RX ORDER — ATORVASTATIN CALCIUM 80 MG/1
20 TABLET, FILM COATED ORAL AT BEDTIME
Refills: 0 | Status: DISCONTINUED | OUTPATIENT
Start: 2020-12-25 | End: 2020-12-28

## 2020-12-25 RX ORDER — SODIUM CHLORIDE 9 MG/ML
1000 INJECTION, SOLUTION INTRAVENOUS
Refills: 0 | Status: DISCONTINUED | OUTPATIENT
Start: 2020-12-25 | End: 2020-12-28

## 2020-12-25 RX ORDER — ONDANSETRON 8 MG/1
4 TABLET, FILM COATED ORAL EVERY 6 HOURS
Refills: 0 | Status: DISCONTINUED | OUTPATIENT
Start: 2020-12-25 | End: 2020-12-28

## 2020-12-25 RX ORDER — LEVOTHYROXINE SODIUM 125 MCG
75 TABLET ORAL DAILY
Refills: 0 | Status: DISCONTINUED | OUTPATIENT
Start: 2020-12-25 | End: 2020-12-25

## 2020-12-25 RX ORDER — DEXTROSE 50 % IN WATER 50 %
12.5 SYRINGE (ML) INTRAVENOUS ONCE
Refills: 0 | Status: DISCONTINUED | OUTPATIENT
Start: 2020-12-25 | End: 2020-12-28

## 2020-12-25 RX ORDER — LEVOTHYROXINE SODIUM 125 MCG
88 TABLET ORAL DAILY
Refills: 0 | Status: DISCONTINUED | OUTPATIENT
Start: 2020-12-25 | End: 2020-12-25

## 2020-12-25 RX ORDER — DEXTROSE 50 % IN WATER 50 %
15 SYRINGE (ML) INTRAVENOUS ONCE
Refills: 0 | Status: DISCONTINUED | OUTPATIENT
Start: 2020-12-25 | End: 2020-12-28

## 2020-12-25 RX ORDER — DEXTROSE 50 % IN WATER 50 %
25 SYRINGE (ML) INTRAVENOUS ONCE
Refills: 0 | Status: DISCONTINUED | OUTPATIENT
Start: 2020-12-25 | End: 2020-12-28

## 2020-12-25 RX ORDER — TRAMADOL HYDROCHLORIDE 50 MG/1
25 TABLET ORAL EVERY 6 HOURS
Refills: 0 | Status: DISCONTINUED | OUTPATIENT
Start: 2020-12-25 | End: 2020-12-28

## 2020-12-25 RX ORDER — LEVOTHYROXINE SODIUM 125 MCG
88 TABLET ORAL DAILY
Refills: 0 | Status: DISCONTINUED | OUTPATIENT
Start: 2020-12-25 | End: 2020-12-28

## 2020-12-25 RX ORDER — GLUCAGON INJECTION, SOLUTION 0.5 MG/.1ML
1 INJECTION, SOLUTION SUBCUTANEOUS ONCE
Refills: 0 | Status: DISCONTINUED | OUTPATIENT
Start: 2020-12-25 | End: 2020-12-28

## 2020-12-25 RX ORDER — PREGABALIN 225 MG/1
1000 CAPSULE ORAL DAILY
Refills: 0 | Status: DISCONTINUED | OUTPATIENT
Start: 2020-12-25 | End: 2020-12-28

## 2020-12-25 RX ORDER — LOSARTAN POTASSIUM 100 MG/1
25 TABLET, FILM COATED ORAL DAILY
Refills: 0 | Status: DISCONTINUED | OUTPATIENT
Start: 2020-12-25 | End: 2020-12-28

## 2020-12-25 RX ORDER — MAGNESIUM HYDROXIDE 400 MG/1
30 TABLET, CHEWABLE ORAL DAILY
Refills: 0 | Status: DISCONTINUED | OUTPATIENT
Start: 2020-12-25 | End: 2020-12-28

## 2020-12-25 RX ORDER — ASCORBIC ACID 60 MG
500 TABLET,CHEWABLE ORAL DAILY
Refills: 0 | Status: DISCONTINUED | OUTPATIENT
Start: 2020-12-25 | End: 2020-12-28

## 2020-12-25 RX ORDER — INSULIN LISPRO 100/ML
VIAL (ML) SUBCUTANEOUS
Refills: 0 | Status: DISCONTINUED | OUTPATIENT
Start: 2020-12-25 | End: 2020-12-28

## 2020-12-25 RX ORDER — POLYETHYLENE GLYCOL 3350 17 G/17G
17 POWDER, FOR SOLUTION ORAL DAILY
Refills: 0 | Status: DISCONTINUED | OUTPATIENT
Start: 2020-12-25 | End: 2020-12-28

## 2020-12-25 RX ORDER — CHOLECALCIFEROL (VITAMIN D3) 125 MCG
2000 CAPSULE ORAL DAILY
Refills: 0 | Status: DISCONTINUED | OUTPATIENT
Start: 2020-12-25 | End: 2020-12-28

## 2020-12-25 RX ORDER — SODIUM CHLORIDE 9 MG/ML
1000 INJECTION INTRAMUSCULAR; INTRAVENOUS; SUBCUTANEOUS ONCE
Refills: 0 | Status: COMPLETED | OUTPATIENT
Start: 2020-12-25 | End: 2020-12-25

## 2020-12-25 RX ORDER — ACETAMINOPHEN 500 MG
650 TABLET ORAL EVERY 6 HOURS
Refills: 0 | Status: DISCONTINUED | OUTPATIENT
Start: 2020-12-25 | End: 2020-12-28

## 2020-12-25 RX ORDER — LEVOTHYROXINE SODIUM 125 MCG
75 TABLET ORAL
Refills: 0 | Status: DISCONTINUED | OUTPATIENT
Start: 2020-12-25 | End: 2020-12-25

## 2020-12-25 RX ADMIN — Medication 2000 UNIT(S): at 09:31

## 2020-12-25 RX ADMIN — MAGNESIUM HYDROXIDE 30 MILLILITER(S): 400 TABLET, CHEWABLE ORAL at 17:47

## 2020-12-25 RX ADMIN — LOSARTAN POTASSIUM 25 MILLIGRAM(S): 100 TABLET, FILM COATED ORAL at 09:32

## 2020-12-25 RX ADMIN — Medication 1 TABLET(S): at 09:31

## 2020-12-25 RX ADMIN — POLYETHYLENE GLYCOL 3350 17 GRAM(S): 17 POWDER, FOR SOLUTION ORAL at 09:30

## 2020-12-25 RX ADMIN — Medication 200 MILLIGRAM(S): at 06:29

## 2020-12-25 RX ADMIN — Medication 200 MILLIGRAM(S): at 22:20

## 2020-12-25 RX ADMIN — Medication 500 MILLIGRAM(S): at 09:30

## 2020-12-25 RX ADMIN — Medication 75 MICROGRAM(S): at 06:31

## 2020-12-25 RX ADMIN — ATORVASTATIN CALCIUM 20 MILLIGRAM(S): 80 TABLET, FILM COATED ORAL at 22:20

## 2020-12-25 RX ADMIN — PREGABALIN 1000 MICROGRAM(S): 225 CAPSULE ORAL at 09:31

## 2020-12-25 RX ADMIN — Medication 1: at 17:47

## 2020-12-25 RX ADMIN — SODIUM CHLORIDE 2000 MILLILITER(S): 9 INJECTION INTRAMUSCULAR; INTRAVENOUS; SUBCUTANEOUS at 04:10

## 2020-12-25 NOTE — H&P ADULT - NSHPPHYSICALEXAM_GEN_ALL_CORE
Vital Signs Last 24 Hrs  T(C): 36.4 (25 Dec 2020 01:03), Max: 36.4 (25 Dec 2020 01:03)  T(F): 97.5 (25 Dec 2020 01:03), Max: 97.5 (25 Dec 2020 01:03)  HR: 78 (25 Dec 2020 01:03) (70 - 78)  BP: 130/76 (25 Dec 2020 01:03) (121/75 - 130/76)  BP(mean): --  RR: 18 (25 Dec 2020 01:03) (17 - 18)  SpO2: 95% (25 Dec 2020 01:03) (94% - 95%)    Telemedicine precludes detailed physical examination  Pt has evidence of dried blood on the bandages near her head  Was not able to be aroused by voice  +Aide came in to assist and tapped pt who awakened  Wearing surgical facemask, she nodded yes and no to some questions  Decent eye contact

## 2020-12-25 NOTE — CONSULT NOTE ADULT - ASSESSMENT
88 year old female w/ hx of fall, on Eliquis, COPD, HLD, HTN, hypothyroid, TIA, trigeminal neuralgia presented to ED s/p unwitnessed fall. Found to have a left lateral scalp hematoma and sternal fracture with a retrosternal hematoma    no active bleeding from scalp hematoma  cleaned wound ad changed dressing at bedside  no laceration to repair, observe scalp hematoma  apply pressure dressing for hemostasis  recommend adequate pain control for sternal fracture  recommend possibly PCA  telemonitoring for EKG changes  reviewed Trp, wnl  no further surgical intervention indicated at this time  thank you for the consult    Plan discussed with Dr Cherry
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
## 2020-12-25 NOTE — PHYSICAL THERAPY INITIAL EVALUATION ADULT - PLANNED THERAPY INTERVENTIONS, PT EVAL
Stair training. Eval, amb, transfers, bed mobility x 15'./bed mobility training/gait training/transfer training

## 2020-12-25 NOTE — H&P ADULT - ASSESSMENT
88 year old female w COPD, HLD, HTN, hypothyroid, TIA, trigeminal neuralgia presented to ED s/p unwitnessed fall      #Unwitnessed fall  #Closed head injury  #Syncope  1. admit to telemetry  2. complete trop trend  3. EKG in AM  4. orthostatic BP check  5. carotid dopplers  6. echo  7. neuro check q 4 hrs  8. PT evaluation      #L scalp hematoma  1. acetaminophen prn  2. Ice 30 min q 4 hrs      #Constipation/fecal impaction  1. MOM  2. dulcolax  3. miralax 17 g      #COPD hx  on no meds chronically for it  Focal fibrosis R apex, mild bibasilar bronchiectasis  1. monitor      #HLD  1. atorvastatin 20 mg      #HTN  1. candesartan interchange      #Hypothyroid  1. levothyroxine 88 mcg q even days, 75 mg q odd days  2. TSH      #Trigeminal neuralgia  1. carbamazepine 200 mg TID      #MISC  reson for eliquis unknown if VTE or PAF      #VTE        IMPROVE VTE Individual Risk Assessment    RISK                                                                Points    [  ] Previous VTE                                                  3    [X  ] Thrombophilia                                               2    [  ] Lower limb paralysis                                      2        (unable to hold up >15 seconds)      [  ] Current Cancer                                              2         (within 6 months)    [  ] Immobilization > 24 hrs                                1    [  ] ICU/CCU stay > 24 hours                              1    [ X ] Age > 60                                                      1    IMPROVE VTE Score ___3______    IMPROVE Score 0-1: Low Risk, No VTE prophylaxis required for most patients, encourage ambulation.   IMPROVE Score 2-3: At risk, pharmacologic VTE prophylaxis is indicated for most patients (in the absence of a contraindication)  IMPROVE Score > or = 4: High Risk, pharmacologic VTE prophylaxis is indicated for most patients (in the absence of a contraindication)      VTE on eliquis  med rec          Follow up  1. orthostatic  2. EKG as I cannot view   88 year old female w COPD, HLD, HTN, hypothyroid, TIA, trigeminal neuralgia presented to ED s/p unwitnessed fall      #Unwitnessed fall  #Closed head injury  #Syncope  1. admit to telemetry  2. complete trop trend  3. EKG in AM  4. orthostatic BP check  5. carotid dopplers  6. echo  7. neuro check q 4 hrs  8. PT evaluation      #L scalp hematoma  1. acetaminophen prn  2. Ice 30 min q 4 hrs      #Constipation/fecal impaction  1. MOM  2. dulcolax  3. miralax 17 g      #COPD hx  on no meds chronically for it  Focal fibrosis R apex, mild bibasilar bronchiectasis  1. monitor      #HLD  1. atorvastatin 20 mg      #HTN  1. candesartan interchange      #Hypothyroid  1. levothyroxine 88 mcg q even days, 75 mg q odd days  2. TSH      #Memory impairment  1. B12      #Trigeminal neuralgia  1. carbamazepine 200 mg TID      #MISC  reson for eliquis unknown if VTE or PAF      #VTE    IMPROVE VTE Individual Risk Assessment    RISK                                                                Points    [  ] Previous VTE                                                  3    [X  ] Thrombophilia                                               2    [  ] Lower limb paralysis                                      2        (unable to hold up >15 seconds)      [  ] Current Cancer                                              2         (within 6 months)    [  ] Immobilization > 24 hrs                                1    [  ] ICU/CCU stay > 24 hours                              1    [ X ] Age > 60                                                      1    IMPROVE VTE Score ___3______    IMPROVE Score 0-1: Low Risk, No VTE prophylaxis required for most patients, encourage ambulation.   IMPROVE Score 2-3: At risk, pharmacologic VTE prophylaxis is indicated for most patients (in the absence of a contraindication)  IMPROVE Score > or = 4: High Risk, pharmacologic VTE prophylaxis is indicated for most patients (in the absence of a contraindication)      VTE on eliquis  med rec  inpatient certification  notified PCP service at number listed [phone]; provider is no longer a part of that practice after speaking to covering Dr. Reyna  Called [phone] @01:50 to notify of admission for syncope        Follow up  1. orthostatic  2. EKG as I cannot view  3. examine scalp to determine wound care  4. may need disimpaction as per CT findings

## 2020-12-25 NOTE — H&P ADULT - ATTENDING COMMENTS
Patient was seen and examined by me after initial evaluation above with the tele-hospitalist, Dr. Tabitha Rosado  Case discussed and reviewed in detail with Dr. Rosado. H&P edited where appropriate. Please see my plan below.    88 year old female with a PMH of COPD, HLD, HTN, hypothyroidism, TIA, and trigeminal neuralgia who presented to ED s/p unwitnessed fall. Patient does not recall the events of the fall. She denies current CP, abdominal pain, nausea, vomiting, SOB, or headache.    Rest of history and limited ROS as per note above.    PHYSICAL EXAM:  GENERAL: No acute distress, but confused. Scalp with large amount of blood, which was wiped and washed down with saline. No clear laceration seen in light of blood and hair but focal location on left scalp that is squirting blood  HEENT: PERRL, EOMI, MM dry, no oropharyngeal lesions  NECK: supple, no stiffness, no JVD, no thyromegaly  PULM: respirations non-labored, clear to auscultation bilaterally, no rales, rhonchi, or wheezes  CV: regular rate and rhythm, no murmurs, gallops, or rubs  GI: abdomen soft, nontender, nondistended, no masses felt, normal bowel sounds  : no genital lesions or ulcers  MSK: strength 5/5 bilateral upper/lower extremities. No joint swelling, erythema, or warmth.  LYMPH: no anterior cervical, posterior cervical, supraclavicular, or inguinal lymphadenopathy  NEURO: A&Ox2 (person and place only), no tremors, sensation intact  SKIN: no rashes, lesions, or edema    All labs and imaging personally reviewed and interpreted.  EKG personally reviewed and interpreted. Normal sinus rhythm, normal axis. TWIs in leads V1-3 (only new in V3). No significant ST depressions or elevations. Rate 70, , QTc 464.    Plan:  1) Unwitnessed fall  - Patient on Eliquis  - Scalp hematoma but no other source of bleeding  - Admit to telemetry  - Continue to trend troponin  - Repeat EKG in AM  - Check orthostatics  - Check carotid dopplers  - Check TTE  - Neuro checks Q4 hours  - F/u UA  - PT evaluation    2) Left parietal scalp hematoma  - Actively bleeding, s/p fall  - No clear laceration seen but location actively ejecting blood  - Notified surgery resident to see urgently  - Pain control  - Monitor on telemetry  - Ice Q30 minutes Q4 hours  - Patient on Eliquis, unclear why. Attempting to contact PCP. Continue to hold  - Obtain coags and type and screen    3) Fecal impaction  - As seen on CT  - Patient has no abdominal pain/distention  - Having small BMs  - Will try MOM, Dulcolax, Miralax for now    4) History of COPD  - On no meds chronically for it  - Focal fibrosis R apex, mild bibasilar bronchiectasis  - Continue to monitor    5) HLD  - C/w Atorvastatin 20 mg QD    6) HTN  - Losartan equivalent dose for Candesartan    7) Hypothyroidism  - C/w Levothyroxine 88 mcg q even days, 75 mg q odd days  - F/u TSH    8) Memory impairment  - A&Ox2, no reported history of dementia  - Check B12, TSH    9) Trigeminal neuralgia  - C/w Carbamazepine 200 mg TID    10) Prophylactic measure  - DVT PPX: IMPROVE score of 3, no PPX due to bleed, will give SCDs  - Diet: DASH  - Dispo: pending improvement in sxs, PT eval

## 2020-12-25 NOTE — PHYSICAL THERAPY INITIAL EVALUATION ADULT - GAIT PATTERN USED, PT EVAL
Pt requried verbal cues to increase posture. Pt also requiring verbal cues to utilize UE"s more on RW secondary to pain left hip region with weight bearing.

## 2020-12-25 NOTE — H&P ADULT - GASTROINTESTINAL COMMENTS
CT revelaed constipation although pt does not answer other questions CT revealed constipation although pt does not answer other questions

## 2020-12-25 NOTE — H&P ADULT - NSHPLABSRESULTS_GEN_ALL_CORE
EXAM:  CT ABDOMEN AND PELVIS                        EXAM:  CT CHEST                          PROCEDURE DATE:  12/24/2020      INTERPRETATION:  CLINICAL INFORMATION: Fall and loss of consciousness. Trauma.    COMPARISON: Chest CT scan 4/13/2018    PROCEDURE:  CT of the Chest, Abdomen and Pelvis was performed without intravenous contrast.  Intravenous contrast: None.  Oral contrast: None.  Sagittal and coronal reformats were performed.    FINDINGS:  CHEST:  LUNGS AND LARGE AIRWAYS: Patent central airways. Small zone of fibrosis in the right apex. Mild right lower lobe bronchiectasis. No evidence of contusion or pneumothorax.  PLEURA: No pleural effusion.  VESSELS: Atherosclerotic changes of the thoracic aorta. No evidence of aneurysm.  HEART: Heart size is normal. No pericardial effusion.  MEDIASTINUM AND SIL: No lymphadenopathy. Hiatus hernia, unchanged since 4/13/2018.  CHEST WALL AND LOWER NECK: Within normal limits.    ABDOMEN AND PELVIS:  LIVER: Within normal limits.  BILE DUCTS: Normal caliber.  GALLBLADDER: Within normal limits.  SPLEEN: Calcified granuloma in the spleen unchanged from 4/13/2018.  PANCREAS: Within normal limits.  ADRENALS: Within normal limits.  KIDNEYS/URETERS: Within normal limits.    BLADDER: Within normal limits.  REPRODUCTIVE ORGANS: Unremarkable.    BOWEL: No bowel obstruction. The appendix is visualized and is normal.Abundant feces identified in the rectum and sigmoid colon consistent with constipation. Feces in the rectum likely represents a fecal impaction.  PERITONEUM: No ascites.  VESSELS: Atherosclerotic changes.  RETROPERITONEUM/LYMPH NODES: No lymphadenopathy.  ABDOMINAL WALL: Within normal limits.  BONES: Degenerative changes in the thoracic and lumbar spine. Evidence of an old, healed fracture of the sternum. Degenerative changes in both shoulders.    IMPRESSION:  No evidence of acute traumatic injury.    Small zone of fibrosis in the right apex. Bibasilar mild bronchiectasis.    Hiatus hernia unchanged since 4/13/2018.    Abundant feces in the rectum and sigmoid consistent with constipation likely a fecal impaction.              ZOE JAMISON MD; Attending Radiologist  This document has been electronically signed. Dec 24 2020  7:45PM

## 2020-12-25 NOTE — PHYSICAL THERAPY INITIAL EVALUATION ADULT - PERTINENT HX OF CURRENT PROBLEM, REHAB EVAL
88 year old female w COPD, HLD, HTN, hypothyroid, TIA, trigeminal neuralgia presented to ED s/p unwitnessed fall, presents with L scalp hematoma, CT chest + sternal fracture, CT Abd + fecal impaction. Significant change in lab values from ED to admission: Inc WBC, dec H/H. 88 year old female w COPD, HLD, HTN, hypothyroid, TIA, trigeminal neuralgia presented to ED s/p unwitnessed fall, presents with L scalp hematoma, CT chest + sternal fracture, CT Abd + fecal impaction. Significant change in lab values from ED to admission: Inc WBC, dec H/H. CT head: neg for acute intra-cranial findngs. left parietal scalp hematoma.

## 2020-12-25 NOTE — CONSULT NOTE ADULT - SUBJECTIVE AND OBJECTIVE BOX
88 year old female w COPD, HLD, HTN, hypothyroid, TIA, trigeminal neuralgia presented to ED s/p unwitnessed fall.    Pt has no memory of what happened and reported to ED staff that she woke up on the floor with her head bleeding  On my interview on camera, pt was only willing to answer a few yes/no questions    "ED spoke to son, Elias: [phone]. who states Hx of TIA, HTN is accurate, pt does take Eliquis, states she does not have any cardiac Hx. Allergic to sulfa drugs. PCP: Lela Luu. Does not have designated a cardiologist."      Ambulance call report not available for review    In ED /75  HR 70  RR 17   T 97.4  94% RA  fall with head strike and +LOC +Eliquis.  Trauma alert called in triage.  pt a/ox3 GCS 15 with significant bleeding at the scene controlled with cling.    Head CT +L scalp hematoma , no ICB; CT Cspine +DJD  CT chest/abd/pelvis:      No evidence of acute traumatic injury.      Small zone of fibrosis in the right apex. Bibasilar mild bronchiectasis.      Abundant feces in the rectum and sigmoid consistent with constipation likely a fecal impaction.      PAST MEDICAL & SURGICAL HISTORY:  Transient ischemic attack (TIA)    Hypertension    No significant past surgical history    Social History:    Allergies    Ambien (Unknown)  aspirin (Unknown)  sulfa drugs (Unknown)    Intolerances    Vital Signs Last 24 Hrs  T(C): 36.4 (25 Dec 2020 01:03), Max: 36.4 (25 Dec 2020 01:03)  T(F): 97.5 (25 Dec 2020 01:03), Max: 97.5 (25 Dec 2020 01:03)  HR: 78 (25 Dec 2020 01:03) (70 - 78)  BP: 130/76 (25 Dec 2020 01:03) (121/75 - 130/76)  BP(mean): --  RR: 18 (25 Dec 2020 01:03) (17 - 18)  SpO2: 95% (25 Dec 2020 01:03) (94% - 95%)    PHYSICAL EXAM:  GENERAL: NAD, lying in bed comfortably  HEAD:  traumatic scalp hematoma, no laceration, no active bleeding, dry blood, normocephalic  EYES: EOMI, PERRLA, conjunctiva and sclera clear  ENT: Moist mucous membranes  NECK: Supple, No JVD  HEART: Regular rate and rhythm; No murmurs, rubs, or gallops  ABDOMEN: Bowel sounds present; Soft, Nontender, Nondistended. No hepatomegally  EXTREMITIES:  2+ Peripheral Pulses, brisk capillary refill. No clubbing, cyanosis, or edema  NERVOUS SYSTEM:  Alert & Oriented X3, speech clear. No deficits   MSK: FROM all 4 extremities, full and equal strength  SKIN: No rashes or lesions    LABS:                        11.8   9.97  )-----------( 202      ( 24 Dec 2020 18:14 )             36.7     24 Dec 2020 18:14    139    |  108    |  25     ----------------------------<  146    4.2     |  25     |  0.98     Ca    8.4        24 Dec 2020 18:14  Mg     2.4       24 Dec 2020 18:14    TPro  6.7    /  Alb  3.4    /  TBili  0.3    /  DBili  x      /  AST  27     /  ALT  34     /  AlkPhos  56     24 Dec 2020 18:14    t< from:     CT Head No Cont (12.24.20 @ 19:16) >  IMPRESSION:    No acute intracranial findings. There is a left lateral scalp hematoma. No fracture.    < end of copied text >  < from: CT Cervical Spine No Cont (12.24.20 @ 19:17) >    IMPRESSION:   No vertebral fracture is recognized.  Multilevel degenerative changes of the cervical spine are present.    There is mild interstitial thickening at the right lung apex, nonspecific, may reflect atelectatic changes, however infection is not excluded, and clinical correlation is needed. Dedicated CT chest may be helpful for further evaluation.        < end of copied text >  < from: CT Abdomen and Pelvis No Cont (12.24.20 @ 19:26) >  IMPRESSION:  No evidence of acute traumatic injury.    Small zone of fibrosis in the right apex. Bibasilar mild bronchiectasis.    Hiatus hernia unchanged since 4/13/2018.    Abundant feces in the rectum and sigmoid consistent with constipation likely a fecal impaction.    < end of copied text >      < from: CT Chest No Cont (04.13.18 @ 16:26) >  IMPRESSION:      Minimally displaced sternal fracture with small retrosternal hematoma    Otherwise no evidence of solid visceral injury on this noncontrast study.   Please note limited evaluation of the vascular structures secondary to   lack of IV contrast         < end of copied text >

## 2020-12-26 LAB
ANION GAP SERPL CALC-SCNC: 2 MMOL/L — LOW (ref 5–17)
BUN SERPL-MCNC: 39 MG/DL — HIGH (ref 7–23)
CALCIUM SERPL-MCNC: 7.9 MG/DL — LOW (ref 8.5–10.1)
CHLORIDE SERPL-SCNC: 112 MMOL/L — HIGH (ref 96–108)
CO2 SERPL-SCNC: 28 MMOL/L — SIGNIFICANT CHANGE UP (ref 22–31)
CREAT SERPL-MCNC: 0.85 MG/DL — SIGNIFICANT CHANGE UP (ref 0.5–1.3)
GLUCOSE SERPL-MCNC: 114 MG/DL — HIGH (ref 70–99)
HCT VFR BLD CALC: 21.6 % — LOW (ref 34.5–45)
HCT VFR BLD CALC: 27.9 % — LOW (ref 34.5–45)
HGB BLD-MCNC: 6.9 G/DL — CRITICAL LOW (ref 11.5–15.5)
HGB BLD-MCNC: 9.4 G/DL — LOW (ref 11.5–15.5)
MCHC RBC-ENTMCNC: 30.9 PG — SIGNIFICANT CHANGE UP (ref 27–34)
MCHC RBC-ENTMCNC: 31.9 GM/DL — LOW (ref 32–36)
MCV RBC AUTO: 96.9 FL — SIGNIFICANT CHANGE UP (ref 80–100)
PLATELET # BLD AUTO: 146 K/UL — LOW (ref 150–400)
POTASSIUM SERPL-MCNC: 4.1 MMOL/L — SIGNIFICANT CHANGE UP (ref 3.5–5.3)
POTASSIUM SERPL-SCNC: 4.1 MMOL/L — SIGNIFICANT CHANGE UP (ref 3.5–5.3)
RBC # BLD: 2.23 M/UL — LOW (ref 3.8–5.2)
RBC # FLD: 13.7 % — SIGNIFICANT CHANGE UP (ref 10.3–14.5)
SARS-COV-2 IGG SERPL QL IA: NEGATIVE — SIGNIFICANT CHANGE UP
SARS-COV-2 IGM SERPL IA-ACNC: <0.1 INDEX — SIGNIFICANT CHANGE UP
SODIUM SERPL-SCNC: 142 MMOL/L — SIGNIFICANT CHANGE UP (ref 135–145)
WBC # BLD: 10.57 K/UL — HIGH (ref 3.8–10.5)
WBC # FLD AUTO: 10.57 K/UL — HIGH (ref 3.8–10.5)

## 2020-12-26 PROCEDURE — 99233 SBSQ HOSP IP/OBS HIGH 50: CPT

## 2020-12-26 RX ADMIN — Medication 200 MILLIGRAM(S): at 05:12

## 2020-12-26 RX ADMIN — LOSARTAN POTASSIUM 25 MILLIGRAM(S): 100 TABLET, FILM COATED ORAL at 09:02

## 2020-12-26 RX ADMIN — Medication 200 MILLIGRAM(S): at 14:42

## 2020-12-26 RX ADMIN — POLYETHYLENE GLYCOL 3350 17 GRAM(S): 17 POWDER, FOR SOLUTION ORAL at 09:02

## 2020-12-26 RX ADMIN — Medication 500 MILLIGRAM(S): at 09:02

## 2020-12-26 RX ADMIN — Medication 2000 UNIT(S): at 09:02

## 2020-12-26 RX ADMIN — Medication 200 MILLIGRAM(S): at 21:34

## 2020-12-26 RX ADMIN — MAGNESIUM HYDROXIDE 30 MILLILITER(S): 400 TABLET, CHEWABLE ORAL at 09:02

## 2020-12-26 RX ADMIN — Medication 10 MILLIGRAM(S): at 09:03

## 2020-12-26 RX ADMIN — ATORVASTATIN CALCIUM 20 MILLIGRAM(S): 80 TABLET, FILM COATED ORAL at 21:34

## 2020-12-26 RX ADMIN — Medication 1 TABLET(S): at 09:02

## 2020-12-26 RX ADMIN — PREGABALIN 1000 MICROGRAM(S): 225 CAPSULE ORAL at 09:02

## 2020-12-26 RX ADMIN — Medication 88 MICROGRAM(S): at 05:12

## 2020-12-27 LAB
HCT VFR BLD CALC: 25.9 % — LOW (ref 34.5–45)
HGB BLD-MCNC: 8.7 G/DL — LOW (ref 11.5–15.5)
MCHC RBC-ENTMCNC: 31.3 PG — SIGNIFICANT CHANGE UP (ref 27–34)
MCHC RBC-ENTMCNC: 33.6 GM/DL — SIGNIFICANT CHANGE UP (ref 32–36)
MCV RBC AUTO: 93.2 FL — SIGNIFICANT CHANGE UP (ref 80–100)
PLATELET # BLD AUTO: 136 K/UL — LOW (ref 150–400)
RBC # BLD: 2.78 M/UL — LOW (ref 3.8–5.2)
RBC # FLD: 14.7 % — HIGH (ref 10.3–14.5)
WBC # BLD: 9.19 K/UL — SIGNIFICANT CHANGE UP (ref 3.8–10.5)
WBC # FLD AUTO: 9.19 K/UL — SIGNIFICANT CHANGE UP (ref 3.8–10.5)

## 2020-12-27 PROCEDURE — 99232 SBSQ HOSP IP/OBS MODERATE 35: CPT

## 2020-12-27 RX ADMIN — POLYETHYLENE GLYCOL 3350 17 GRAM(S): 17 POWDER, FOR SOLUTION ORAL at 09:14

## 2020-12-27 RX ADMIN — Medication 88 MICROGRAM(S): at 06:02

## 2020-12-27 RX ADMIN — PREGABALIN 1000 MICROGRAM(S): 225 CAPSULE ORAL at 09:14

## 2020-12-27 RX ADMIN — Medication 2000 UNIT(S): at 09:14

## 2020-12-27 RX ADMIN — ATORVASTATIN CALCIUM 20 MILLIGRAM(S): 80 TABLET, FILM COATED ORAL at 21:19

## 2020-12-27 RX ADMIN — LOSARTAN POTASSIUM 25 MILLIGRAM(S): 100 TABLET, FILM COATED ORAL at 09:14

## 2020-12-27 RX ADMIN — Medication 1 TABLET(S): at 09:14

## 2020-12-27 RX ADMIN — Medication 200 MILLIGRAM(S): at 13:20

## 2020-12-27 RX ADMIN — MAGNESIUM HYDROXIDE 30 MILLILITER(S): 400 TABLET, CHEWABLE ORAL at 09:14

## 2020-12-27 RX ADMIN — Medication 200 MILLIGRAM(S): at 21:18

## 2020-12-27 RX ADMIN — Medication 1: at 12:22

## 2020-12-27 RX ADMIN — Medication 500 MILLIGRAM(S): at 09:14

## 2020-12-27 RX ADMIN — Medication 200 MILLIGRAM(S): at 06:02

## 2020-12-27 NOTE — PROGRESS NOTE ADULT - SUBJECTIVE AND OBJECTIVE BOX
CC: Syncope  Mechanical fall  Closed head injury (25 Dec 2020 03:24)    HPI: 88 year old female with PMHx of COPD, HLD, HTN, hypothyroidism, TIA, trigeminal neuralgia presented to ED s/p unwitnessed fall.    Pt has no memory of what happened and reported to ED staff that she woke up on the floor with her head bleeding  On my interview on camera, pt was only willing to answer a few yes/no questions  "ED spoke to sonElias: [phone]. who states Hx of TIA, HTN is accurate, pt does take Eliquis, states she does not have any cardiac Hx. Allergic to sulfa drugs. PCP: Lela Luu. Does not have designated a cardiologist."    Stable overnight but more anemic today; per son she had significant blood loss after the fall                          6.9    10.57 )-----------( 146      ( 26 Dec 2020 06:56 )             21.6   12-26    142  |  112<H>  |  39<H>  ----------------------------<  114<H>  4.1   |  28  |  0.85    Ca    7.9<L>      26 Dec 2020 06:56  Phos  4.5     12-25  Mg     2.3     12-25    TPro  6.7  /  Alb  3.4  /  TBili  0.3  /  DBili  x   /  AST  27  /  ALT  34  /  AlkPhos  56  12-24        PHYSICAL EXAM:    General: elderly female in no acute distress  Eyes: conjunctiva and sclera clear  Head: Normocephalic; atraumatic, scalp with dried up blood in hairs  ENMT: No nasal discharge; airway clear  Neck: Supple; non tender; no masses  Respiratory: No wheezes, rales or rhonchi  Cardiovascular: S1, S2 reg with II/VI PETROS at LSB in 5th ICS  Gastrointestinal: Soft non-tender non-distended; Normal bowel sounds  Genitourinary: No costovertebral angle tenderness  Extremities: No clubbing, cyanosis or edema  Vascular: Peripheral pulses palpable 2+ bilaterally  Neurological: Alert and oriented to person  Skin: Warm and dry.   Musculoskeletal: Normal tone, without deformities  Psychiatric: Cooperative
CC: Syncope  Mechanical fall  Closed head injury (25 Dec 2020 03:24)    88 year old female with PMHx of COPD, HLD, HTN, hypothyroidism, TIA, trigeminal neuralgia presented to ED s/p unwitnessed fall.  Pt has no memory of what happened and reported to ED staff that she woke up on the floor with her head bleeding  Per son, Elias: [phone]. who states Hx of TIA, HTN is accurate, pt does not take Eliquis, states she does not have any cardiac Hx. Allergic to sulfa drugs. PCP: Lela Luu. Does not have designated a cardiologist.  Stable overnight was more anemic after adm; per son she had significant blood loss after the fall; received 1 PRBC; had BM, no c/o today               Vital Signs Last 24 Hrs  T(C): 36.9 (27 Dec 2020 06:05), Max: 37.8 (26 Dec 2020 23:07)  T(F): 98.4 (27 Dec 2020 06:05), Max: 100 (26 Dec 2020 23:07)  HR: 79 (27 Dec 2020 07:00) (79 - 95)  BP: 157/72 (27 Dec 2020 07:00) (82/53 - 169/86)  BP(mean): 89 (27 Dec 2020 07:00) (60 - 121)  RR: 21 (27 Dec 2020 07:00) (17 - 31)  SpO2: 95% (27 Dec 2020 07:00) (92% - 97%)      General: elderly female in no acute distress  Eyes: conjunctiva and sclera clear  Head: Normocephalic; atraumatic, scalp with dried up blood in hairs  ENMT: No nasal discharge; airway clear  Neck: Supple; non tender; no masses  Respiratory: No wheezes, rales or rhonchi  Cardiovascular: S1, S2 reg with II/VI PETROS at LSB in 5th ICS  Gastrointestinal: Soft non-tender non-distended; Normal bowel sounds  Genitourinary: No costovertebral angle tenderness  Extremities: No clubbing, cyanosis or edema  Vascular: Peripheral pulses palpable 2+ bilaterally  Neurological: Alert and oriented to person  Skin: Warm and dry.   Musculoskeletal: Normal tone, without deformities  Psychiatric: Cooperative
CC: Syncope  Mechanical fall  Closed head injury (25 Dec 2020 03:24)    HPI: 88 year old female with PMHx of COPD, HLD, HTN, hypothyroidism, TIA, trigeminal neuralgia presented to ED s/p unwitnessed fall.    Pt has no memory of what happened and reported to ED staff that she woke up on the floor with her head bleeding  On my interview on camera, pt was only willing to answer a few yes/no questions    "ED spoke to sonElias: [phone]. who states Hx of TIA, HTN is accurate, pt does take Eliquis, states she does not have any cardiac Hx. Allergic to sulfa drugs. PCP: Lela Luu. Does not have designated a cardiologist."    INTERVAL HPI/OVERNIGHT EVENTS: pleasantly confused, denies any complaints    Vital Signs Last 24 Hrs  T(C): 36 (25 Dec 2020 08:40), Max: 36.4 (25 Dec 2020 01:03)  T(F): 96.8 (25 Dec 2020 08:40), Max: 97.5 (25 Dec 2020 01:03)  HR: 77 (25 Dec 2020 14:00) (69 - 87)  BP: 128/60 (25 Dec 2020 14:00) (72/37 - 135/59)  BP(mean): 77 (25 Dec 2020 14:00) (45 - 80)  RR: 22 (25 Dec 2020 14:00) (16 - 25)  SpO2: 91% (25 Dec 2020 14:00) (91% - 100%)    CARDIAC MARKERS ( 25 Dec 2020 03:35 )  <0.015 ng/mL / x     / x     / x     / x      CARDIAC MARKERS ( 24 Dec 2020 20:43 )  <0.015 ng/mL / x     / x     / x     / x      CARDIAC MARKERS ( 24 Dec 2020 18:14 )  <0.015 ng/mL / x     / x     / x     / x                           9.0    13.76 )-----------( 196      ( 25 Dec 2020 03:35 )             27.9     25 Dec 2020 03:35    139    |  107    |  29     ----------------------------<  243    4.9     |  23     |  1.24     Ca    8.1        25 Dec 2020 03:35  Phos  4.5       25 Dec 2020 03:35  Mg     2.3       25 Dec 2020 03:35    TPro  6.7    /  Alb  3.4    /  TBili  0.3    /  DBili  x      /  AST  27     /  ALT  34     /  AlkPhos  56     24 Dec 2020 18:14    PT/INR - ( 25 Dec 2020 03:35 )   PT: 12.6 sec;   INR: 1.08 ratio       PTT - ( 25 Dec 2020 03:35 )  PTT:30.1 sec  CAPILLARY BLOOD GLUCOSE    POCT Blood Glucose.: 114 mg/dL (25 Dec 2020 13:41)  POCT Blood Glucose.: 129 mg/dL (25 Dec 2020 08:57)    LIVER FUNCTIONS - ( 24 Dec 2020 18:14 )  Alb: 3.4 g/dL / Pro: 6.7 gm/dL / ALK PHOS: 56 U/L / ALT: 34 U/L / AST: 27 U/L / GGT: x           MEDICATIONS  (STANDING):  ascorbic acid 500 milliGRAM(s) Oral daily  atorvastatin 20 milliGRAM(s) Oral at bedtime  bisacodyl Suppository 10 milliGRAM(s) Rectal daily  carBAMazepine 200 milliGRAM(s) Oral three times a day  cholecalciferol 2000 Unit(s) Oral daily  cyanocobalamin 1000 MICROGram(s) Oral daily  dextrose 40% Gel 15 Gram(s) Oral once  dextrose 5%. 1000 milliLiter(s) (100 mL/Hr) IV Continuous <Continuous>  dextrose 50% Injectable 25 Gram(s) IV Push once  dextrose 50% Injectable 12.5 Gram(s) IV Push once  dextrose 50% Injectable 25 Gram(s) IV Push once  glucagon  Injectable 1 milliGRAM(s) IntraMuscular once  insulin lispro (ADMELOG) corrective regimen sliding scale   SubCutaneous three times a day before meals  insulin lispro (ADMELOG) corrective regimen sliding scale   SubCutaneous at bedtime  levothyroxine 75 MICROGram(s) Oral <User Schedule>  losartan 25 milliGRAM(s) Oral daily  magnesium hydroxide Suspension 30 milliLiter(s) Oral daily  multivitamin 1 Tablet(s) Oral daily  polyethylene glycol 3350 17 Gram(s) Oral daily    MEDICATIONS  (PRN):  acetaminophen   Tablet .. 650 milliGRAM(s) Oral every 6 hours PRN Temp greater or equal to 38C (100.4F), Mild Pain (1 - 3)  aluminum hydroxide/magnesium hydroxide/simethicone Suspension 30 milliLiter(s) Oral every 4 hours PRN Dyspepsia  ondansetron Injectable 4 milliGRAM(s) IV Push every 6 hours PRN Nausea  traMADol 25 milliGRAM(s) Oral every 6 hours PRN Severe Pain (7 - 10)    RADIOLOGY & ADDITIONAL TESTS: personally visualized    PHYSICAL EXAM:    General: elderly female in no acute distress  Eyes: conjunctiva and sclera clear  Head: Normocephalic; atraumatic, scalp with dried up blood in hairs  ENMT: No nasal discharge; airway clear  Neck: Supple; non tender; no masses  Respiratory: No wheezes, rales or rhonchi  Cardiovascular: S1, S2 reg with II/VI PETROS at LSB in 5th ICS  Gastrointestinal: Soft non-tender non-distended; Normal bowel sounds  Genitourinary: No costovertebral angle tenderness  Extremities: No clubbing, cyanosis or edema  Vascular: Peripheral pulses palpable 2+ bilaterally  Neurological: Alert and oriented to person  Skin: Warm and dry.   Musculoskeletal: Normal tone, without deformities  Psychiatric: Cooperative

## 2020-12-27 NOTE — PROGRESS NOTE ADULT - REASON FOR ADMISSION
Syncope  Mechanical fall  Closed head injury

## 2020-12-27 NOTE — PROGRESS NOTE ADULT - ASSESSMENT
1) Unwitnessed fall on Eliquis with left Scalp hematoma - stopped, off eliquis   - possible syncope - mo events on telemetry, f/u TTE    2) Left parietal scalp hematoma - bleeding stopped, monitor HH    3) Fecal impaction - bowel regimen    4) History of COPD with Focal fibrosis R apex, mild bibasilar bronchiectasis  - Continue to monitor    5) HLD - C/w Atorvastatin 20 mg QD    6) HTN - Losartan equivalent dose for Candesartan    7) Hypothyroidism - change to Levothyroxine 88 mcg as TSH 22    8) Memory impairment - confused, likely underlying dementia, f/u B12, folate    9) Trigeminal neuralgia - C/w Carbamazepine 200 mg TID    10) Prophylactic measure - SCDs at the moment    11) Old healed sternum Fx - no new fracture
* Unwitnessed fall  NOT on Eliquis with left Scalp hematoma - stopped;    - possible syncope - no events on telemetry per nursing    * Left parietal scalp hematoma - bleeding stopped,   transfused PRBC case d/w son    * Fecal impaction - bowel regimen  resolving    * History of COPD with Focal fibrosis R apex, mild bibasilar bronchiectasis  - Continue to monitor    * Anemia sec to acute blood loss s/p PRBC  per son she has significant bleed after the fall  repeat cbc in am    Per son either Rehab or home with aids; would like PT eval and risk assessment; if not a candidate for Rehab he needs to find aids. Pt lives home alone.  I called [facility] and they confirmed, pt not on any AC.
* Unwitnessed fall on Eliquis with left Scalp hematoma - stopped, off eliquis   - possible syncope - no events on telemetry, f/u TTE    * Left parietal scalp hematoma - bleeding stopped,   transfuse PRBC case d/w son    * Fecal impaction - bowel regimen  resolving    * History of COPD with Focal fibrosis R apex, mild bibasilar bronchiectasis  - Continue to monitor      to clarify reason for Eliquis  veno dyne boots for now
hypoxemia/unresponsive

## 2020-12-28 ENCOUNTER — TRANSCRIPTION ENCOUNTER (OUTPATIENT)
Age: 85
End: 2020-12-28

## 2020-12-28 VITALS — SYSTOLIC BLOOD PRESSURE: 159 MMHG | TEMPERATURE: 98 F | DIASTOLIC BLOOD PRESSURE: 86 MMHG

## 2020-12-28 LAB
HCT VFR BLD CALC: 25.5 % — LOW (ref 34.5–45)
HGB BLD-MCNC: 8.5 G/DL — LOW (ref 11.5–15.5)
MCHC RBC-ENTMCNC: 31.4 PG — SIGNIFICANT CHANGE UP (ref 27–34)
MCHC RBC-ENTMCNC: 33.3 GM/DL — SIGNIFICANT CHANGE UP (ref 32–36)
MCV RBC AUTO: 94.1 FL — SIGNIFICANT CHANGE UP (ref 80–100)
PLATELET # BLD AUTO: 145 K/UL — LOW (ref 150–400)
RBC # BLD: 2.71 M/UL — LOW (ref 3.8–5.2)
RBC # FLD: 14.1 % — SIGNIFICANT CHANGE UP (ref 10.3–14.5)
SARS-COV-2 RNA SPEC QL NAA+PROBE: SIGNIFICANT CHANGE UP
WBC # BLD: 8.57 K/UL — SIGNIFICANT CHANGE UP (ref 3.8–10.5)
WBC # FLD AUTO: 8.57 K/UL — SIGNIFICANT CHANGE UP (ref 3.8–10.5)

## 2020-12-28 PROCEDURE — 99239 HOSP IP/OBS DSCHRG MGMT >30: CPT

## 2020-12-28 RX ORDER — APIXABAN 2.5 MG/1
1 TABLET, FILM COATED ORAL
Qty: 0 | Refills: 0 | DISCHARGE

## 2020-12-28 RX ADMIN — MAGNESIUM HYDROXIDE 30 MILLILITER(S): 400 TABLET, CHEWABLE ORAL at 10:10

## 2020-12-28 RX ADMIN — Medication 2000 UNIT(S): at 10:10

## 2020-12-28 RX ADMIN — Medication 200 MILLIGRAM(S): at 05:31

## 2020-12-28 RX ADMIN — Medication 88 MICROGRAM(S): at 05:30

## 2020-12-28 RX ADMIN — Medication 500 MILLIGRAM(S): at 10:10

## 2020-12-28 RX ADMIN — Medication 200 MILLIGRAM(S): at 14:57

## 2020-12-28 RX ADMIN — PREGABALIN 1000 MICROGRAM(S): 225 CAPSULE ORAL at 10:10

## 2020-12-28 RX ADMIN — LOSARTAN POTASSIUM 25 MILLIGRAM(S): 100 TABLET, FILM COATED ORAL at 10:10

## 2020-12-28 RX ADMIN — POLYETHYLENE GLYCOL 3350 17 GRAM(S): 17 POWDER, FOR SOLUTION ORAL at 10:10

## 2020-12-28 RX ADMIN — Medication 1 TABLET(S): at 10:10

## 2020-12-28 NOTE — DISCHARGE NOTE PROVIDER - NSDCCPCAREPLAN_GEN_ALL_CORE_FT
PRINCIPAL DISCHARGE DIAGNOSIS  Diagnosis: Syncope, unspecified syncope type  Assessment and Plan of Treatment: resolved      SECONDARY DISCHARGE DIAGNOSES  Diagnosis: Closed head injury, initial encounter  Assessment and Plan of Treatment:     Diagnosis: Scalp hematoma, initial encounter  Assessment and Plan of Treatment:

## 2020-12-28 NOTE — DISCHARGE NOTE PROVIDER - CARE PROVIDER_API CALL
Lela Luu)  Internal Medicine  [address] [place] [address], [address]  [place], [place] [id number]  Phone: [phone]  Fax: [phone]  Follow Up Time:

## 2020-12-28 NOTE — DISCHARGE NOTE NURSING/CASE MANAGEMENT/SOCIAL WORK - PATIENT PORTAL LINK FT
You can access the FollowMyHealth Patient Portal offered by Health system by registering at the following website: [email or web address]. By joining [employer]’s FollowMyHealth portal, you will also be able to view your health information using other applications (apps) compatible with our system.

## 2020-12-28 NOTE — DISCHARGE NOTE PROVIDER - HOSPITAL COURSE
88 year old female with PMHx of COPD, HLD, HTN, hypothyroidism, TIA, trigeminal neuralgia presented to ED s/p unwitnessed fall.  Pt has no memory of what happened and reported to ED staff that she woke up on the floor with her head bleeding  Per son, Elias: [phone]. who states Hx of TIA, HTN is accurate, pt does not take Eliquis, states she does not have any cardiac Hx. Allergic to sulfa drugs. PCP: Lela Luu. Does not have designated a cardiologist.  Stable overnight was more anemic after adm; per son she had significant blood loss after the fall; received 1 PRBC; had BM, no c/o today     Unwitnessed fall  NOT on Eliquis with left Scalp hematoma - stopped;    - possible syncope - no events on telemetry per nursing    * Left parietal scalp hematoma - bleeding stopped,   transfused PRBC case d/w son    * Fecal impaction - bowel regimen  resolving; monitor bowel regimen at rehab    * History of COPD with Focal fibrosis R apex, mild bibasilar bronchiectasis  - Continue to monitor    * Anemia sec to acute blood loss s/p PRBC  per son she has significant bleed after the fall      I called [facility] and they confirmed, pt not on any AC.

## 2020-12-28 NOTE — DISCHARGE NOTE PROVIDER - NSDCMRMEDTOKEN_GEN_ALL_CORE_FT
alendronate 70 mg oral tablet: 1 tab(s) orally once a week  ascorbic acid 500 mg oral tablet: 1 tab(s) orally once a day  atorvastatin 20 mg oral tablet: 1 tab(s) orally once a day (at bedtime)  candesartan 8 mg oral tablet: 1 tab(s) orally 2 times a day  carBAMazepine 200 mg oral tablet: 1 tab(s) orally 3 times a day  cholecalciferol 2000 intl units (50 mcg) oral tablet: 1 tab(s) orally once a day  cyanocobalamin 1000 mcg oral tablet: 1 tab(s) orally once a day  levothyroxine 75 mcg (0.075 mg) oral tablet: 1 tab(s) orally every other day (on odd days)  levothyroxine 88 mcg (0.088 mg) oral tablet: 1 tab(s) orally once a day  ****on even days***  Multiple Vitamins oral tablet: 1 tab(s) orally once a day

## 2021-01-04 DIAGNOSIS — R55 SYNCOPE AND COLLAPSE: ICD-10-CM

## 2021-01-04 DIAGNOSIS — S00.03XA CONTUSION OF SCALP, INITIAL ENCOUNTER: ICD-10-CM
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
## 2021-01-04 DIAGNOSIS — R41.0 DISORIENTATION, UNSPECIFIED: ICD-10-CM
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
## 2021-01-04 DIAGNOSIS — J84.10 PULMONARY FIBROSIS, UNSPECIFIED: ICD-10-CM

## 2021-01-04 DIAGNOSIS — Z86.73 PERSONAL HISTORY OF TRANSIENT ISCHEMIC ATTACK (TIA), AND CEREBRAL INFARCTION WITHOUT RESIDUAL DEFICITS: ICD-10-CM
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
## 2021-01-04 DIAGNOSIS — M47.812 SPONDYLOSIS WITHOUT MYELOPATHY OR RADICULOPATHY, CERVICAL REGION: ICD-10-CM

## 2021-01-04 DIAGNOSIS — R41.3 OTHER AMNESIA: ICD-10-CM

## 2021-01-04 DIAGNOSIS — Z79.01 LONG TERM (CURRENT) USE OF ANTICOAGULANTS: ICD-10-CM

## 2021-01-04 DIAGNOSIS — Z87.891 PERSONAL HISTORY OF NICOTINE DEPENDENCE: ICD-10-CM

## 2021-01-04 DIAGNOSIS — G51.39 CLONIC HEMIFACIAL SPASM, UNSPECIFIED: ICD-10-CM

## 2021-01-04 DIAGNOSIS — Z88.2 ALLERGY STATUS TO SULFONAMIDES: ICD-10-CM

## 2021-01-04 DIAGNOSIS — K56.49 OTHER IMPACTION OF INTESTINE: ICD-10-CM

## 2021-01-04 DIAGNOSIS — J44.9 CHRONIC OBSTRUCTIVE PULMONARY DISEASE, UNSPECIFIED: ICD-10-CM

## 2021-01-04 DIAGNOSIS — D62 ACUTE POSTHEMORRHAGIC ANEMIA: ICD-10-CM

## 2021-01-04 DIAGNOSIS — E78.00 PURE HYPERCHOLESTEROLEMIA, UNSPECIFIED: ICD-10-CM
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
## 2021-01-04 DIAGNOSIS — Z88.8 ALLERGY STATUS TO OTHER DRUGS, MEDICAMENTS AND BIOLOGICAL SUBSTANCES: ICD-10-CM

## 2021-01-04 DIAGNOSIS — I10 ESSENTIAL (PRIMARY) HYPERTENSION: ICD-10-CM

## 2021-01-04 DIAGNOSIS — Z87.81 PERSONAL HISTORY OF (HEALED) TRAUMATIC FRACTURE: ICD-10-CM

## 2021-01-13 ENCOUNTER — APPOINTMENT (OUTPATIENT)
Dept: INTERNAL MEDICINE | Facility: CLINIC | Age: 86
End: 2021-01-13

## 2021-01-13 DIAGNOSIS — K63.5 POLYP OF COLON: ICD-10-CM

## 2021-01-13 DIAGNOSIS — E46 UNSPECIFIED PROTEIN-CALORIE MALNUTRITION: ICD-10-CM

## 2021-01-13 DIAGNOSIS — M81.0 AGE-RELATED OSTEOPOROSIS W/OUT CURRENT PATHOLOGICAL FRACTURE: ICD-10-CM

## 2021-01-13 DIAGNOSIS — I34.0 NONRHEUMATIC MITRAL (VALVE) INSUFFICIENCY: ICD-10-CM

## 2021-01-13 DIAGNOSIS — E03.9 HYPOTHYROIDISM, UNSPECIFIED: ICD-10-CM

## 2021-01-13 DIAGNOSIS — F32.9 MAJOR DEPRESSIVE DISORDER, SINGLE EPISODE, UNSPECIFIED: ICD-10-CM

## 2021-01-13 DIAGNOSIS — R27.0 ATAXIA, UNSPECIFIED: ICD-10-CM

## 2021-01-13 DIAGNOSIS — J44.9 CHRONIC OBSTRUCTIVE PULMONARY DISEASE, UNSPECIFIED: ICD-10-CM

## 2021-01-13 DIAGNOSIS — E78.00 PURE HYPERCHOLESTEROLEMIA, UNSPECIFIED: ICD-10-CM

## 2021-01-13 RX ORDER — CARBAMAZEPINE 200 MG/1
200 TABLET ORAL
Qty: 90 | Refills: 0 | Status: DISCONTINUED | COMMUNITY
Start: 2018-03-28 | End: 2021-01-13

## 2021-01-13 RX ORDER — LEVOTHYROXINE SODIUM 0.09 MG/1
88 TABLET ORAL DAILY
Qty: 30 | Refills: 0 | Status: ACTIVE | COMMUNITY

## 2021-01-13 RX ORDER — LEVOTHYROXINE SODIUM 0.07 MG/1
75 TABLET ORAL
Refills: 0 | Status: ACTIVE | COMMUNITY
Start: 2017-11-22

## 2021-01-13 RX ORDER — METOPROLOL SUCCINATE 25 MG/1
25 TABLET, EXTENDED RELEASE ORAL
Qty: 180 | Refills: 1 | Status: ACTIVE | COMMUNITY

## 2021-01-13 RX ORDER — ASPIRIN 325 MG/1
325 TABLET ORAL
Refills: 0 | Status: ACTIVE | COMMUNITY

## 2021-01-13 RX ORDER — CHLORTHALIDONE 25 MG/1
25 TABLET ORAL
Qty: 30 | Refills: 0 | Status: DISCONTINUED | COMMUNITY
Start: 2018-03-08 | End: 2021-01-13

## 2021-01-13 RX ORDER — CARBAMAZEPINE 100 MG/1
100 TABLET, EXTENDED RELEASE ORAL TWICE DAILY
Refills: 0 | Status: ACTIVE | COMMUNITY

## 2021-01-13 RX ORDER — CLOPIDOGREL BISULFATE 75 MG/1
75 TABLET, FILM COATED ORAL
Qty: 90 | Refills: 0 | Status: DISCONTINUED | COMMUNITY
Start: 2017-11-23 | End: 2021-01-13

## 2021-01-13 RX ORDER — ATORVASTATIN CALCIUM 20 MG/1
20 TABLET, FILM COATED ORAL
Qty: 30 | Refills: 0 | Status: ACTIVE | COMMUNITY

## 2021-01-13 RX ORDER — METOPROLOL SUCCINATE 25 MG/1
25 TABLET, EXTENDED RELEASE ORAL
Qty: 90 | Refills: 0 | Status: DISCONTINUED | COMMUNITY
Start: 2017-12-05 | End: 2021-01-13

## 2021-01-13 RX ORDER — CHLORTHALIDONE 25 MG/1
25 TABLET ORAL
Refills: 0 | Status: DISCONTINUED | COMMUNITY
End: 2021-01-13

## 2021-01-13 RX ORDER — ALENDRONATE SODIUM 35 MG/1
35 TABLET ORAL
Qty: 12 | Refills: 1 | Status: ACTIVE | COMMUNITY

## 2021-01-13 RX ORDER — CANDESARTAN CILEXETIL 8 MG/1
8 TABLET ORAL
Qty: 180 | Refills: 0 | Status: DISCONTINUED | COMMUNITY
Start: 2018-02-21 | End: 2021-01-13

## 2021-07-18 ENCOUNTER — EMERGENCY (EMERGENCY)
Facility: HOSPITAL | Age: 86
LOS: 1 days | Discharge: HOME | End: 2021-07-18
Attending: EMERGENCY MEDICINE
Payer: MEDICARE

## 2021-07-18 VITALS
TEMPERATURE: 97 F | RESPIRATION RATE: 20 BRPM | OXYGEN SATURATION: 96 % | DIASTOLIC BLOOD PRESSURE: 112 MMHG | HEART RATE: 71 BPM | SYSTOLIC BLOOD PRESSURE: 187 MMHG

## 2021-07-18 VITALS
HEART RATE: 75 BPM | TEMPERATURE: 98 F | RESPIRATION RATE: 18 BRPM | DIASTOLIC BLOOD PRESSURE: 81 MMHG | WEIGHT: 139.99 LBS | SYSTOLIC BLOOD PRESSURE: 134 MMHG | OXYGEN SATURATION: 94 % | HEIGHT: 63 IN

## 2021-07-18 LAB
ALBUMIN SERPL ELPH-MCNC: 4.4 G/DL — SIGNIFICANT CHANGE UP (ref 3.3–5)
ALP SERPL-CCNC: 83 U/L — SIGNIFICANT CHANGE UP (ref 40–120)
ALT FLD-CCNC: 19 U/L — SIGNIFICANT CHANGE UP (ref 10–45)
ANION GAP SERPL CALC-SCNC: 11 MMOL/L — SIGNIFICANT CHANGE UP (ref 5–17)
APTT BLD: 34 SEC — SIGNIFICANT CHANGE UP (ref 27.5–35.5)
AST SERPL-CCNC: 23 U/L — SIGNIFICANT CHANGE UP (ref 10–40)
BILIRUB SERPL-MCNC: 0.3 MG/DL — SIGNIFICANT CHANGE UP (ref 0.2–1.2)
BLD GP AB SCN SERPL QL: NEGATIVE — SIGNIFICANT CHANGE UP
BUN SERPL-MCNC: 17 MG/DL — SIGNIFICANT CHANGE UP (ref 7–23)
CALCIUM SERPL-MCNC: 9.8 MG/DL — SIGNIFICANT CHANGE UP (ref 8.4–10.5)
CHLORIDE SERPL-SCNC: 90 MMOL/L — LOW (ref 96–108)
CO2 SERPL-SCNC: 26 MMOL/L — SIGNIFICANT CHANGE UP (ref 22–31)
CREAT SERPL-MCNC: 0.67 MG/DL — SIGNIFICANT CHANGE UP (ref 0.5–1.3)
GLUCOSE SERPL-MCNC: 89 MG/DL — SIGNIFICANT CHANGE UP (ref 70–99)
HCT VFR BLD CALC: 38.3 % — SIGNIFICANT CHANGE UP (ref 34.5–45)
HGB BLD-MCNC: 13.2 G/DL — SIGNIFICANT CHANGE UP (ref 11.5–15.5)
INR BLD: 0.93 RATIO — SIGNIFICANT CHANGE UP (ref 0.88–1.16)
MCHC RBC-ENTMCNC: 31 PG — SIGNIFICANT CHANGE UP (ref 27–34)
MCHC RBC-ENTMCNC: 34.5 GM/DL — SIGNIFICANT CHANGE UP (ref 32–36)
MCV RBC AUTO: 89.9 FL — SIGNIFICANT CHANGE UP (ref 80–100)
NRBC # BLD: 0 /100 WBCS — SIGNIFICANT CHANGE UP (ref 0–0)
PLATELET # BLD AUTO: 257 K/UL — SIGNIFICANT CHANGE UP (ref 150–400)
POTASSIUM SERPL-MCNC: 4.7 MMOL/L — SIGNIFICANT CHANGE UP (ref 3.5–5.3)
POTASSIUM SERPL-SCNC: 4.7 MMOL/L — SIGNIFICANT CHANGE UP (ref 3.5–5.3)
PROT SERPL-MCNC: 7.4 G/DL — SIGNIFICANT CHANGE UP (ref 6–8.3)
PROTHROM AB SERPL-ACNC: 11.2 SEC — SIGNIFICANT CHANGE UP (ref 10.6–13.6)
RBC # BLD: 4.26 M/UL — SIGNIFICANT CHANGE UP (ref 3.8–5.2)
RBC # FLD: 13.2 % — SIGNIFICANT CHANGE UP (ref 10.3–14.5)
RH IG SCN BLD-IMP: NEGATIVE — SIGNIFICANT CHANGE UP
SODIUM SERPL-SCNC: 127 MMOL/L — LOW (ref 135–145)
WBC # BLD: 7.91 K/UL — SIGNIFICANT CHANGE UP (ref 3.8–10.5)
WBC # FLD AUTO: 7.91 K/UL — SIGNIFICANT CHANGE UP (ref 3.8–10.5)

## 2021-07-18 PROCEDURE — 73552 X-RAY EXAM OF FEMUR 2/>: CPT

## 2021-07-18 PROCEDURE — 85610 PROTHROMBIN TIME: CPT

## 2021-07-18 PROCEDURE — 72192 CT PELVIS W/O DYE: CPT

## 2021-07-18 PROCEDURE — 72125 CT NECK SPINE W/O DYE: CPT

## 2021-07-18 PROCEDURE — 71045 X-RAY EXAM CHEST 1 VIEW: CPT

## 2021-07-18 PROCEDURE — 73552 X-RAY EXAM OF FEMUR 2/>: CPT | Mod: 26,LT

## 2021-07-18 PROCEDURE — 76377 3D RENDER W/INTRP POSTPROCES: CPT | Mod: 26

## 2021-07-18 PROCEDURE — 73502 X-RAY EXAM HIP UNI 2-3 VIEWS: CPT

## 2021-07-18 PROCEDURE — 99284 EMERGENCY DEPT VISIT MOD MDM: CPT | Mod: GC

## 2021-07-18 PROCEDURE — 90471 IMMUNIZATION ADMIN: CPT

## 2021-07-18 PROCEDURE — 72125 CT NECK SPINE W/O DYE: CPT | Mod: 26,MH

## 2021-07-18 PROCEDURE — 72170 X-RAY EXAM OF PELVIS: CPT

## 2021-07-18 PROCEDURE — 73502 X-RAY EXAM HIP UNI 2-3 VIEWS: CPT | Mod: 26,LT

## 2021-07-18 PROCEDURE — 86850 RBC ANTIBODY SCREEN: CPT

## 2021-07-18 PROCEDURE — 72192 CT PELVIS W/O DYE: CPT | Mod: 26,MA

## 2021-07-18 PROCEDURE — 86901 BLOOD TYPING SEROLOGIC RH(D): CPT

## 2021-07-18 PROCEDURE — 72170 X-RAY EXAM OF PELVIS: CPT | Mod: 26,59

## 2021-07-18 PROCEDURE — 93005 ELECTROCARDIOGRAM TRACING: CPT

## 2021-07-18 PROCEDURE — 76376 3D RENDER W/INTRP POSTPROCES: CPT

## 2021-07-18 PROCEDURE — 76377 3D RENDER W/INTRP POSTPROCES: CPT

## 2021-07-18 PROCEDURE — 85027 COMPLETE CBC AUTOMATED: CPT

## 2021-07-18 PROCEDURE — 86900 BLOOD TYPING SEROLOGIC ABO: CPT

## 2021-07-18 PROCEDURE — 71045 X-RAY EXAM CHEST 1 VIEW: CPT | Mod: 26

## 2021-07-18 PROCEDURE — 90715 TDAP VACCINE 7 YRS/> IM: CPT

## 2021-07-18 PROCEDURE — 93010 ELECTROCARDIOGRAM REPORT: CPT

## 2021-07-18 PROCEDURE — 99285 EMERGENCY DEPT VISIT HI MDM: CPT | Mod: 25

## 2021-07-18 PROCEDURE — 70450 CT HEAD/BRAIN W/O DYE: CPT | Mod: 26,MH

## 2021-07-18 PROCEDURE — 70486 CT MAXILLOFACIAL W/O DYE: CPT

## 2021-07-18 PROCEDURE — 70450 CT HEAD/BRAIN W/O DYE: CPT

## 2021-07-18 PROCEDURE — 85730 THROMBOPLASTIN TIME PARTIAL: CPT

## 2021-07-18 PROCEDURE — 70486 CT MAXILLOFACIAL W/O DYE: CPT | Mod: 26,MA

## 2021-07-18 PROCEDURE — 80053 COMPREHEN METABOLIC PANEL: CPT

## 2021-07-18 RX ORDER — TETANUS TOXOID, REDUCED DIPHTHERIA TOXOID AND ACELLULAR PERTUSSIS VACCINE, ADSORBED 5; 2.5; 8; 8; 2.5 [IU]/.5ML; [IU]/.5ML; UG/.5ML; UG/.5ML; UG/.5ML
0.5 SUSPENSION INTRAMUSCULAR ONCE
Refills: 0 | Status: COMPLETED | OUTPATIENT
Start: 2021-07-18 | End: 2021-07-18

## 2021-07-18 RX ADMIN — TETANUS TOXOID, REDUCED DIPHTHERIA TOXOID AND ACELLULAR PERTUSSIS VACCINE, ADSORBED 0.5 MILLILITER(S): 5; 2.5; 8; 8; 2.5 SUSPENSION INTRAMUSCULAR at 14:56

## 2021-07-18 NOTE — ED PROVIDER NOTE - CARE PROVIDER_API CALL
Jas Zhang  ORTHOPAEDIC SURGERY  [address] [place] [address], [address]  [place] [address]  Phone: [phone]  Fax: [phone]  Follow Up Time: 4-6 Days

## 2021-07-18 NOTE — ED ADULT NURSE REASSESSMENT NOTE - NS ED NURSE REASSESS COMMENT FT1
primary RN spoke to JOSE ROBERTO Frost at the [facility] at [phone]. as per RN [facility] is OK with [facility] going back to the [facility] without a nonemergent at this time. WYATT baig and admin maria e easton.

## 2021-07-18 NOTE — ED ADULT NURSE REASSESSMENT NOTE - NS ED NURSE REASSESS COMMENT FT1
patient family made aware that patient must wait for nonemergent back to [facility] as per [facility] policy. [facility] states "he can bring her back to the [facility] without a nonemergent" son does not want to wait for nonemergent and states "I will be taking her back myself I do not want her waiting for hours for a ride back" son educated on our policy and aware at this time. MD Juan and WYATT Lozano aware. patient ambulatory at this time.

## 2021-07-18 NOTE — ED PROVIDER NOTE - PROGRESS NOTE DETAILS
Marvin, PGY3: xray pelv showing possible L hip fx, pt without clinical signs of fx (no pain, full AROM, ambulating without pain), will order xrays/CT pelv/labs/ortho Marvin, PGY3: Ortho consulted, seems L hip fx was on CT done in 12/2020, will get dedicated femur/hip xrays, ortho to see pt, likely dc Marvin, PGY3: ortho saw pt, recs for outpt f/u as fx is chronic from December 2020, will dc DENNISE Juan, Attending: sign out from Deann. Some concern for L hip traumatic pathology but based off hx likely chronic, seen on prior imaging per ortho. Pt walking and with no pain. FROM. Plan for outpatient follow up with ortho (Dr. Lamb per the resident in ER). Per ortho, spoke to radiology and they agree changes are seen on prior imaging and will change formal read. Son explained the situation. Given pt ambulatory and oriented son states will drive her back to [facility]. Told policy is usually to go be non emergent but son states he would prefer it this way.

## 2021-07-18 NOTE — ED PROVIDER NOTE - PHYSICAL EXAMINATION
GENERAL: no acute distress, non-toxic appearing  HEENT: normal conjunctiva, bruising to bridge of nose with abrasion, no septal hematoma, eomi without any signs of entrapment, no c spine tend  CARDIAC: regular rate and regular rhythm, good pulses all extremities  PULM: clear to ascultation bilaterally, sats well on RA, no incr wob  GI: abdomen nondistended, soft, nontender  : no suprapubic tenderness  NEURO: alert and oriented x 2, normal speech, CN's 3-12 intact, DONNA, EOMI, no pronator drift, good strength all extremities  MSK: no visible deformities, no peripheral edema, no spine tend/stepoffs, no pelvic tend  SKIN: ecchymosis to bridge nose with abrasion  PSYCH: appropriate mood and affect

## 2021-07-18 NOTE — ED ADULT NURSE NOTE - OBJECTIVE STATEMENT
87 y/o female BIBA s/p fall. A&Ox3 with periods of confusion. Ambulatory with a walker. PMH COPD, dementia, HLD, osteoporosis, TIA. As per EMS patient had a fall 2 days ago and she hit the front of her nose on her walker. Negative LOC, no anticoagulants. Patient was sent by the [facility] due to bruising to the front of the nose. Patient reports she is not in any pain at this time. SPO2 100% on room air. Capillary refill less than 2 seconds. Abdomen is soft, non distended, non tender. Hand grasps and pedal pushes strong and equal b/l. Patient moving all extremities. Patient denies chest pain, SOB, fever, chills, n/v/d, urinary symptoms, abdominal pain. Safety measures maintained. Bed in the lowest position. Call bell within reach.  MD at the bedside. No acute distress noted or further complaints at this time.

## 2021-07-18 NOTE — ED PROVIDER NOTE - OBJECTIVE STATEMENT
88F PMH Dementia (baseline AAOx2-3), not on AC's or AP's, coming from [facility] for bruising on her nose. States that she tripped and caught herself on her walker with facial trauma to nose, no LOC. Denies surrounding chest pain, palpitations, sob, diaphoresis, nausea. Denies any recent fevers/chills, uri sxs, cough, abd pain, v/d, urinary sxs, rash. Denies any headaches/vision changes, weakness/numbness/tingling in extremities. Denies any pain at this time. She's aaox2-3 here and states she feels like her normal self. Was sent from [facility] because they noticed bruising to the nose.

## 2021-07-18 NOTE — ED PROVIDER NOTE - NSFOLLOWUPINSTRUCTIONS_ED_ALL_ED_FT
- Please follow up with your Primary Care Doctor within 48 hours. Bring your results from today.    - Tylenol up to 650 mg every 6 hours as needed for pain.    - Be sure to return to the ED if you develop new or worsening symptoms. Specific signs and symptoms to be vigilant of: headaches, vision changes, weakness/numbness/tingling in extremities, change in bowel or bladder habits, or any other distressing symptoms. - Please follow up with your Primary Care Doctor within 48 hours. Bring your results from today.    - Follow up with orthopedist Dr. Zhang within 1 week.     - Tylenol up to 650 mg every 6 hours as needed for pain.    - Be sure to return to the ED if you develop new or worsening symptoms. Specific signs and symptoms to be vigilant of: headaches, vision changes, weakness/numbness/tingling in extremities, change in bowel or bladder habits, or any other distressing symptoms.

## 2021-07-18 NOTE — ED PROVIDER NOTE - ATTENDING CONTRIBUTION TO CARE
pt is a 89 y/o female here for head injury from the [facility] sustained several days ago, not on AC. bruising noted to the nasal bridge, maew, at baseline mental status. ct head max face ordered.

## 2021-07-18 NOTE — CONSULT NOTE ADULT - ASSESSMENT
A/P: 87 yo F with a chronic impacted left subcapital femoral neck fracture. Discussed with son and patient the options of operative fixation versus continue nonoperative treatment of the chronic impacted femoral neck fracture. Son and patient wanted to continue nonoperative treatment due to the chronic nature of the injury and the fact that she has no left hip pain and is walking with her walker with no pain.     - WBAT LLE with ambulatory assist devices as needed  - Follow up with Dr. Zhang in 1-2 weeks following discharge. Call office at [phone] to make appointment  - Please page back as needed if you have questions.   - Discussed plan with Dr. Zhang

## 2021-07-18 NOTE — CONSULT NOTE ADULT - SUBJECTIVE AND OBJECTIVE BOX
89yo Female PMH of dementia (A&O x 2) who was sent to the ED from [facility] for facial bruising over her nose. Son was at bedside for entire exam and helped supplement patient's history. Patient has a history of fall and reports last falling 5 days ago. She denies left hip pain. Reports walking with walker without pain and worked with physical therapy 2 days ago. Per son, she fell december 2020 and had significant left hip pain at that time. She had a CT scan at that time that shows left subcapital femoral neck irregularity. Her left hip pain improved since december 2020 and has been ambulating with a walker since then. Patient denies numbness or tingling in the LLE. Patient denies any other injuries. She has been walking with walker in the ED without pain.     ROS: 10 point review of systems otherwise negative unless noted in HPI    PMH:  Hypertension    Transient ischemic attack (TIA)      PSH:  No significant past surgical history      AH:  Ambien (Unknown)  aspirin (Unknown)    Meds: See med rec    T(C): 36.3 (07-18-21 @ 18:06)  HR: 71 (07-18-21 @ 18:06)  BP: 187/112 (07-18-21 @ 18:06)  RR: 20 (07-18-21 @ 18:06)  SpO2: 96% (07-18-21 @ 18:06)  Wt(kg): --                        13.2   7.91  )-----------( 257      ( 18 Jul 2021 15:28 )             38.3     07-18    127<L>  |  90<L>  |  17  ----------------------------<  89  4.7   |  26  |  0.67    Ca    9.8      18 Jul 2021 15:28    TPro  7.4  /  Alb  4.4  /  TBili  0.3  /  DBili  x   /  AST  23  /  ALT  19  /  AlkPhos  83  07-18    PT/INR - ( 18 Jul 2021 15:28 )   PT: 11.2 sec;   INR: 0.93 ratio         PTT - ( 18 Jul 2021 15:28 )  PTT:34.0 sec      PE  Gen: NAD, alert and oriented to person and place  Resp: Unlabored breathing  LLE: Skin intact, no ecchymosis, no erythema       SILT DP/SP/ Triny/Saph,        +EHL/FHL/TA/Gastroc,        hip/Knee/ankle painless ROM,        hip internal/external ROM without pain, hip flexion to 100 degrees without pain        DP+,        soft compartments, no calf ttp,        negative log roll, negative heel strike      Secondary:  No TTP over bony landmarks, SILT BL, ROM intact BL, distal pulses palpable.    Imaging:  XR demonstrating chronic left impacted femoral neck fracture    < from: CT Pelvis Bony Only No Cont (07.18.21 @ 16:13) >  IMPRESSION:    Mildly impacted and mildly displaced left femoral neck fracture.    --- End of Report ---                NATHANAEL MENDOSA MD; Attending Radiologist  This document has been electronically signed. Jul 18 2021  6:38PM    < end of copied text >

## 2021-07-18 NOTE — ED PROVIDER NOTE - PATIENT PORTAL LINK FT
You can access the FollowMyHealth Patient Portal offered by [facility] by registering at the following website: [email or web address]. By joining [employer]’s FollowMyHealth portal, you will also be able to view your health information using other applications (apps) compatible with our system.

## 2021-10-20 NOTE — ED ADULT NURSE NOTE - HIV OFFER
Opt out Bactrim Pregnancy And Lactation Text: This medication is Pregnancy Category D and is known to cause fetal risk.  It is also excreted in breast milk.

## 2021-10-26 ENCOUNTER — EMERGENCY (EMERGENCY)
Facility: HOSPITAL | Age: 86
LOS: 1 days | Discharge: HOME | End: 2021-10-26
Attending: EMERGENCY MEDICINE
Payer: MEDICARE

## 2021-10-26 VITALS
RESPIRATION RATE: 20 BRPM | WEIGHT: 125 LBS | OXYGEN SATURATION: 95 % | HEIGHT: 63 IN | DIASTOLIC BLOOD PRESSURE: 116 MMHG | HEART RATE: 82 BPM | SYSTOLIC BLOOD PRESSURE: 194 MMHG | TEMPERATURE: 96 F

## 2021-10-26 LAB
ALBUMIN SERPL ELPH-MCNC: 4.1 G/DL — SIGNIFICANT CHANGE UP (ref 3.3–5)
ALP SERPL-CCNC: 68 U/L — SIGNIFICANT CHANGE UP (ref 40–120)
ALT FLD-CCNC: 20 U/L — SIGNIFICANT CHANGE UP (ref 10–45)
ANION GAP SERPL CALC-SCNC: 13 MMOL/L — SIGNIFICANT CHANGE UP (ref 5–17)
AST SERPL-CCNC: 21 U/L — SIGNIFICANT CHANGE UP (ref 10–40)
BASOPHILS # BLD AUTO: 0.11 K/UL — SIGNIFICANT CHANGE UP (ref 0–0.2)
BASOPHILS NFR BLD AUTO: 1.4 % — SIGNIFICANT CHANGE UP (ref 0–2)
BILIRUB SERPL-MCNC: 0.2 MG/DL — SIGNIFICANT CHANGE UP (ref 0.2–1.2)
BUN SERPL-MCNC: 24 MG/DL — HIGH (ref 7–23)
CALCIUM SERPL-MCNC: 8.8 MG/DL — SIGNIFICANT CHANGE UP (ref 8.4–10.5)
CHLORIDE SERPL-SCNC: 97 MMOL/L — SIGNIFICANT CHANGE UP (ref 96–108)
CO2 SERPL-SCNC: 21 MMOL/L — LOW (ref 22–31)
CREAT SERPL-MCNC: 0.75 MG/DL — SIGNIFICANT CHANGE UP (ref 0.5–1.3)
EOSINOPHIL # BLD AUTO: 0.2 K/UL — SIGNIFICANT CHANGE UP (ref 0–0.5)
EOSINOPHIL NFR BLD AUTO: 2.5 % — SIGNIFICANT CHANGE UP (ref 0–6)
GLUCOSE SERPL-MCNC: 80 MG/DL — SIGNIFICANT CHANGE UP (ref 70–99)
HCT VFR BLD CALC: 34.2 % — LOW (ref 34.5–45)
HGB BLD-MCNC: 11.2 G/DL — LOW (ref 11.5–15.5)
IMM GRANULOCYTES NFR BLD AUTO: 0.6 % — SIGNIFICANT CHANGE UP (ref 0–1.5)
LYMPHOCYTES # BLD AUTO: 1.69 K/UL — SIGNIFICANT CHANGE UP (ref 1–3.3)
LYMPHOCYTES # BLD AUTO: 21.4 % — SIGNIFICANT CHANGE UP (ref 13–44)
MCHC RBC-ENTMCNC: 30.7 PG — SIGNIFICANT CHANGE UP (ref 27–34)
MCHC RBC-ENTMCNC: 32.7 GM/DL — SIGNIFICANT CHANGE UP (ref 32–36)
MCV RBC AUTO: 93.7 FL — SIGNIFICANT CHANGE UP (ref 80–100)
MONOCYTES # BLD AUTO: 0.83 K/UL — SIGNIFICANT CHANGE UP (ref 0–0.9)
MONOCYTES NFR BLD AUTO: 10.5 % — SIGNIFICANT CHANGE UP (ref 2–14)
NEUTROPHILS # BLD AUTO: 5 K/UL — SIGNIFICANT CHANGE UP (ref 1.8–7.4)
NEUTROPHILS NFR BLD AUTO: 63.6 % — SIGNIFICANT CHANGE UP (ref 43–77)
NRBC # BLD: 0 /100 WBCS — SIGNIFICANT CHANGE UP (ref 0–0)
PLATELET # BLD AUTO: 244 K/UL — SIGNIFICANT CHANGE UP (ref 150–400)
POTASSIUM SERPL-MCNC: 4.3 MMOL/L — SIGNIFICANT CHANGE UP (ref 3.5–5.3)
POTASSIUM SERPL-SCNC: 4.3 MMOL/L — SIGNIFICANT CHANGE UP (ref 3.5–5.3)
PROT SERPL-MCNC: 6.2 G/DL — SIGNIFICANT CHANGE UP (ref 6–8.3)
RBC # BLD: 3.65 M/UL — LOW (ref 3.8–5.2)
RBC # FLD: 13.5 % — SIGNIFICANT CHANGE UP (ref 10.3–14.5)
SODIUM SERPL-SCNC: 131 MMOL/L — LOW (ref 135–145)
WBC # BLD: 7.88 K/UL — SIGNIFICANT CHANGE UP (ref 3.8–10.5)
WBC # FLD AUTO: 7.88 K/UL — SIGNIFICANT CHANGE UP (ref 3.8–10.5)

## 2021-10-26 PROCEDURE — 36415 COLL VENOUS BLD VENIPUNCTURE: CPT

## 2021-10-26 PROCEDURE — 71045 X-RAY EXAM CHEST 1 VIEW: CPT | Mod: 26

## 2021-10-26 PROCEDURE — 93005 ELECTROCARDIOGRAM TRACING: CPT

## 2021-10-26 PROCEDURE — 71045 X-RAY EXAM CHEST 1 VIEW: CPT

## 2021-10-26 PROCEDURE — 87086 URINE CULTURE/COLONY COUNT: CPT

## 2021-10-26 PROCEDURE — 80053 COMPREHEN METABOLIC PANEL: CPT

## 2021-10-26 PROCEDURE — 82962 GLUCOSE BLOOD TEST: CPT

## 2021-10-26 PROCEDURE — 99284 EMERGENCY DEPT VISIT MOD MDM: CPT | Mod: 25

## 2021-10-26 PROCEDURE — 81001 URINALYSIS AUTO W/SCOPE: CPT

## 2021-10-26 PROCEDURE — 99284 EMERGENCY DEPT VISIT MOD MDM: CPT

## 2021-10-26 PROCEDURE — 85025 COMPLETE CBC W/AUTO DIFF WBC: CPT

## 2021-10-26 NOTE — ED ADULT NURSE NOTE - NSIMPLEMENTINTERV_GEN_ALL_ED
Implemented All Fall with Harm Risk Interventions:  [place] to call system. Call bell, personal items and telephone within reach. Instruct patient to call for assistance. Room bathroom lighting operational. Non-slip footwear when patient is off stretcher. Physically safe environment: no spills, clutter or unnecessary equipment. Stretcher in lowest position, wheels locked, appropriate side rails in place. Provide visual cue, wrist band, yellow gown, etc. Monitor gait and stability. Monitor for mental status changes and reorient to person, place, and time. Review medications for side effects contributing to fall risk. Reinforce activity limits and safety measures with patient and family. Provide visual clues: red socks.

## 2021-10-26 NOTE — ED ADULT NURSE NOTE - OBJECTIVE STATEMENT
89y/o female BIBEMS a&ox2, s/p "period of unresponsiveness". As per EMS, patient was found by nursing home staff, sitting in bed, "unresponsive". Patient presents to ED alert and awake, stating she does not understand why she was taken here. Patient does not recall events and states she feels her usual self. Denies any pain, dizziness, cp, sob or any other complaints. Patient hypertensive, but otherwise vitals stable. BG checked and EKG done, placed on CM. MD [place] at bedside for eval.

## 2021-10-26 NOTE — ED ADULT TRIAGE NOTE - CHIEF COMPLAINT QUOTE
Altered mental status; med tech states during medication administration she was "acting weird." PMH of dementia

## 2021-10-26 NOTE — ED ADULT TRIAGE NOTE - HEART RATE (BEATS/MIN)
SPOKE TO  [address] ([address]) SCHEDULED @ [facility] SENT 82

## 2021-10-26 NOTE — ED ADULT TRIAGE NOTE - BEFAST BALANCE
[facility] [place]  [address], [address]  [place] [address]  Phone: [phone]  Fax: [phone]    Pt Name: Madalyn Graves  Injury Date: 7/17/2017   Employee ID:  Date 08/28/2017   Company: [employer]            Appointment Time: 10:00 AM Arrived: 10:00 AM CDT   Physician: Viral Clemons MD Check out: 12:31 PM           Office Treatment: Madalyn was seen today for wrist pain.    Diagnoses and all orders for this visit:    Sprain of right wrist, subsequent encounter    Sprain of metacarpophalangeal (MCP) joint of right thumb, subsequent encounter       Patient Instructions: Daily home exercises/warm soaks      WORK STATUS: Regular Duty,   Discharged from Occupational [facility]   (Permanent restriction of no [place])               No

## 2021-10-26 NOTE — ED ADULT TRIAGE NOTE - ESI TRIAGE ACUITY LEVEL, MLM
Alert-The patient is alert, awake and responds to voice. The patient is oriented to time, place, and person. The triage nurse is able to obtain subjective information. 3

## 2021-10-27 VITALS
TEMPERATURE: 97 F | DIASTOLIC BLOOD PRESSURE: 110 MMHG | OXYGEN SATURATION: 95 % | SYSTOLIC BLOOD PRESSURE: 190 MMHG | RESPIRATION RATE: 16 BRPM | HEART RATE: 74 BPM

## 2021-10-27 LAB
APPEARANCE UR: CLEAR — SIGNIFICANT CHANGE UP
BACTERIA # UR AUTO: NEGATIVE — SIGNIFICANT CHANGE UP
BILIRUB UR-MCNC: NEGATIVE — SIGNIFICANT CHANGE UP
COLOR SPEC: SIGNIFICANT CHANGE UP
DIFF PNL FLD: NEGATIVE — SIGNIFICANT CHANGE UP
EPI CELLS # UR: 0 /HPF — SIGNIFICANT CHANGE UP
GLUCOSE UR QL: NEGATIVE — SIGNIFICANT CHANGE UP
HYALINE CASTS # UR AUTO: 0 /LPF — SIGNIFICANT CHANGE UP (ref 0–2)
KETONES UR-MCNC: NEGATIVE — SIGNIFICANT CHANGE UP
LEUKOCYTE ESTERASE UR-ACNC: NEGATIVE — SIGNIFICANT CHANGE UP
NITRITE UR-MCNC: NEGATIVE — SIGNIFICANT CHANGE UP
PH UR: 6.5 — SIGNIFICANT CHANGE UP (ref 5–8)
PROT UR-MCNC: SIGNIFICANT CHANGE UP
RBC CASTS # UR COMP ASSIST: 2 /HPF — SIGNIFICANT CHANGE UP (ref 0–4)
SP GR SPEC: 1.01 — SIGNIFICANT CHANGE UP (ref 1.01–1.02)
UROBILINOGEN FLD QL: NEGATIVE — SIGNIFICANT CHANGE UP
WBC UR QL: 2 /HPF — SIGNIFICANT CHANGE UP (ref 0–5)

## 2021-10-27 NOTE — ED PROVIDER NOTE - PHYSICAL EXAMINATION
CONSTITUTIONAL: NAD  SKIN: Warm dry, normal skin turgor  HEAD: NCAT  ENT: normal pharynx with no erythema or exudates  NECK: Supple; non tender. Full ROM.  CARD: RRR, no murmurs.  RESP: clear to ausculation b/l. No crackles or wheezing.  ABD: soft, non-tender, non-distended, no rebound or guarding.  MSK: No pedal edema, no calf tenderness  PSYCH: Cooperative, Pt A/oX2, pt slightly confused

## 2021-10-27 NOTE — ED PROVIDER NOTE - CLINICAL SUMMARY MEDICAL DECISION MAKING FREE TEXT BOX
AMS which has resolved upon arrival to ED now acting @ baseline, concern for transient AMS 2/2 infectious etiology, r/o UTI, pneumonia, labs, possibly consistent w/ hx of TIAs, if all negative OK to return home

## 2021-10-27 NOTE — ED PROVIDER NOTE - PATIENT PORTAL LINK FT
You can access the FollowMyHealth Patient Portal offered by [facility] by registering at the following website: [email or web address]. By joining [employer]’s FollowMyHealth portal, you will also be able to view your health information using other applications (apps) compatible with our system.

## 2021-10-27 NOTE — ED PROVIDER NOTE - NSFOLLOWUPINSTRUCTIONS_ED_ALL_ED_FT
You were seen in the Emergency Department for altered mental status.    Follow up with your primary care provider within 3-5 days.     If you have continued altered mental status, if you develop fever, chills, nausea, vomiting, new or worsening pain, or if you have any new symptoms return to the Emergency Department.

## 2021-10-27 NOTE — ED PROVIDER NOTE - OBJECTIVE STATEMENT
89F PMH dementia presents to the ED, prior TIAs, presents to the ED for a 5 minute period of unresponsiveness witnessed @ assisted living facility, as per family  pt was acting off baseline, however pt is now @ baseline. Pt denies any complaints, states that "I feel totally fine", denies urinary complaints, any other complaints.

## 2021-10-27 NOTE — ED PROVIDER NOTE - ATTENDING CONTRIBUTION TO CARE
Patient brought from assisted living for episode of change in mental status now back to baseline, offering no complaint, no trauma, unremarkable exam, will obtain screening labs/UA and re-evaluate.

## 2021-10-27 NOTE — ED PROVIDER NOTE - NS ED ROS FT
Constitutional:  (-) fever, (-) chills, (-) lethargy  Cardiac: (-) chest pain (-) palpitations  Respiratory:  (-) cough (-) respiratory distress.   GI:  (-) nausea (-) vomiting (-) diarrhea (-) abdominal pain.  :  (-) dysuria (-) frequency (-) burning.  MS:  (-) back pain (-) joint pain.  Neuro:  (-) headache (-) numbness (-) tingling (-) focal weakness  Skin:  (-) rash  Except as documented in the HPI,  all other systems are negative

## 2021-10-28 LAB
CULTURE RESULTS: SIGNIFICANT CHANGE UP
SPECIMEN SOURCE: SIGNIFICANT CHANGE UP

## 2021-10-29 NOTE — ED POST DISCHARGE NOTE - DETAILS
10/30/21: [facility] who transferred me to wellness line. No Answer but LM to callback admin line Massimo Cleaning PA-C 10/31/21: Called Dakshager[facility] Beth Cruz again, asked operated to transfer me to nurse manager instead as wellness line never answers and did not answer again today. Have been leaving messages on wellness line with no return calls. Was transferred to Loretta Davis who also did not answer. LM on her VM as well. If no call back by end of day will send certified letter. Massimo Cleaning PA-C 10/31/21: Called Farhana Beth Cruz again, asked operated to transfer me to nurse manager instead as wellness line never answers and did not answer again today. Have been leaving messages on wellness line with no return calls. Was transferred to Loretta Davis who also did not answer. LM on her VM as well. If no call back by end of day will send certified letter. Massimo Cleaning PA-C CERTIFIED LETTER SENT BY ANNALISE PHAM

## 2021-10-29 NOTE — ED POST DISCHARGE NOTE - OTHER COMMUNICATION
Casey Stinson PA-C: 10/29 Documented hx of dementia. Called both pt and daughters phones which went right to  without ringing. Pt believed to be resident at [facility] [phone]. Called and left voicemail for 1522 and [id number] with nurse manager.

## 2021-10-29 NOTE — ED POST DISCHARGE NOTE - ADDITIONAL DOCUMENTATION
Casey Stinson PA-C: 10/29 Called both pt and daughters phones which went right to  without ringing. Pt believed to be resident at [facility]. Called and left voicemail for [id number] and [id number] with nurse manager. Casey Stinson PA-C: 10/29 Documented hx of dementia. Called both pt and daughters phones which went right to  without ringing. Pt believed to be resident at [facility] [phone]. Called and left voicemail for 1522 and 3770 with nurse manager. Casey Stinson PA-C: 10/29 [facility]. Went back in pts chart and forind son Virginie phone number who [facility] [phone] in [place]. Called there and left vm on wellness line for 1522 and 3770 call back.

## 2021-11-28 ENCOUNTER — INPATIENT (INPATIENT)
Facility: HOSPITAL | Age: 86
LOS: 3 days | Discharge: NURSING, REHAB OR LONG-TERM CARE | DRG: 67 | End: 2021-12-02
Attending: INTERNAL MEDICINE | Admitting: HOSPITALIST
Payer: MEDICARE

## 2021-11-28 VITALS
HEIGHT: 63 IN | SYSTOLIC BLOOD PRESSURE: 127 MMHG | WEIGHT: 139.99 LBS | DIASTOLIC BLOOD PRESSURE: 63 MMHG | OXYGEN SATURATION: 95 % | HEART RATE: 76 BPM | RESPIRATION RATE: 18 BRPM | TEMPERATURE: 98 F

## 2021-11-28 DIAGNOSIS — R41.82 ALTERED MENTAL STATUS, UNSPECIFIED: ICD-10-CM

## 2021-11-28 LAB
ALBUMIN SERPL ELPH-MCNC: 4.1 G/DL — SIGNIFICANT CHANGE UP (ref 3.3–5)
ALP SERPL-CCNC: 81 U/L — SIGNIFICANT CHANGE UP (ref 40–120)
ALT FLD-CCNC: 20 U/L — SIGNIFICANT CHANGE UP (ref 10–45)
ANION GAP SERPL CALC-SCNC: 13 MMOL/L — SIGNIFICANT CHANGE UP (ref 5–17)
APPEARANCE UR: CLEAR — SIGNIFICANT CHANGE UP
APTT BLD: 33 SEC — SIGNIFICANT CHANGE UP (ref 27.5–35.5)
AST SERPL-CCNC: 21 U/L — SIGNIFICANT CHANGE UP (ref 10–40)
BACTERIA # UR AUTO: NEGATIVE — SIGNIFICANT CHANGE UP
BASOPHILS # BLD AUTO: 0.11 K/UL — SIGNIFICANT CHANGE UP (ref 0–0.2)
BASOPHILS NFR BLD AUTO: 1.1 % — SIGNIFICANT CHANGE UP (ref 0–2)
BILIRUB SERPL-MCNC: 0.3 MG/DL — SIGNIFICANT CHANGE UP (ref 0.2–1.2)
BILIRUB UR-MCNC: NEGATIVE — SIGNIFICANT CHANGE UP
BUN SERPL-MCNC: 20 MG/DL — SIGNIFICANT CHANGE UP (ref 7–23)
CALCIUM SERPL-MCNC: 8.5 MG/DL — SIGNIFICANT CHANGE UP (ref 8.4–10.5)
CHLORIDE SERPL-SCNC: 101 MMOL/L — SIGNIFICANT CHANGE UP (ref 96–108)
CO2 SERPL-SCNC: 22 MMOL/L — SIGNIFICANT CHANGE UP (ref 22–31)
COLOR SPEC: YELLOW — SIGNIFICANT CHANGE UP
CREAT SERPL-MCNC: 1.06 MG/DL — SIGNIFICANT CHANGE UP (ref 0.5–1.3)
DIFF PNL FLD: NEGATIVE — SIGNIFICANT CHANGE UP
EOSINOPHIL # BLD AUTO: 0.16 K/UL — SIGNIFICANT CHANGE UP (ref 0–0.5)
EOSINOPHIL NFR BLD AUTO: 1.6 % — SIGNIFICANT CHANGE UP (ref 0–6)
EPI CELLS # UR: 1 /HPF — SIGNIFICANT CHANGE UP
GLUCOSE SERPL-MCNC: 165 MG/DL — HIGH (ref 70–99)
GLUCOSE UR QL: NEGATIVE — SIGNIFICANT CHANGE UP
HCT VFR BLD CALC: 35.3 % — SIGNIFICANT CHANGE UP (ref 34.5–45)
HGB BLD-MCNC: 11.8 G/DL — SIGNIFICANT CHANGE UP (ref 11.5–15.5)
HYALINE CASTS # UR AUTO: 5 /LPF — HIGH (ref 0–2)
IMM GRANULOCYTES NFR BLD AUTO: 0.9 % — SIGNIFICANT CHANGE UP (ref 0–1.5)
INR BLD: 1.01 RATIO — SIGNIFICANT CHANGE UP (ref 0.88–1.16)
KETONES UR-MCNC: NEGATIVE — SIGNIFICANT CHANGE UP
LEUKOCYTE ESTERASE UR-ACNC: NEGATIVE — SIGNIFICANT CHANGE UP
LYMPHOCYTES # BLD AUTO: 1.29 K/UL — SIGNIFICANT CHANGE UP (ref 1–3.3)
LYMPHOCYTES # BLD AUTO: 13 % — SIGNIFICANT CHANGE UP (ref 13–44)
MCHC RBC-ENTMCNC: 31.6 PG — SIGNIFICANT CHANGE UP (ref 27–34)
MCHC RBC-ENTMCNC: 33.4 GM/DL — SIGNIFICANT CHANGE UP (ref 32–36)
MCV RBC AUTO: 94.4 FL — SIGNIFICANT CHANGE UP (ref 80–100)
MONOCYTES # BLD AUTO: 0.98 K/UL — HIGH (ref 0–0.9)
MONOCYTES NFR BLD AUTO: 9.8 % — SIGNIFICANT CHANGE UP (ref 2–14)
NEUTROPHILS # BLD AUTO: 7.32 K/UL — SIGNIFICANT CHANGE UP (ref 1.8–7.4)
NEUTROPHILS NFR BLD AUTO: 73.6 % — SIGNIFICANT CHANGE UP (ref 43–77)
NITRITE UR-MCNC: NEGATIVE — SIGNIFICANT CHANGE UP
NRBC # BLD: 0 /100 WBCS — SIGNIFICANT CHANGE UP (ref 0–0)
PH UR: 6 — SIGNIFICANT CHANGE UP (ref 5–8)
PLATELET # BLD AUTO: 295 K/UL — SIGNIFICANT CHANGE UP (ref 150–400)
POTASSIUM SERPL-MCNC: 3.8 MMOL/L — SIGNIFICANT CHANGE UP (ref 3.5–5.3)
POTASSIUM SERPL-SCNC: 3.8 MMOL/L — SIGNIFICANT CHANGE UP (ref 3.5–5.3)
PROT SERPL-MCNC: 6.3 G/DL — SIGNIFICANT CHANGE UP (ref 6–8.3)
PROT UR-MCNC: ABNORMAL
PROTHROM AB SERPL-ACNC: 12.1 SEC — SIGNIFICANT CHANGE UP (ref 10.6–13.6)
RBC # BLD: 3.74 M/UL — LOW (ref 3.8–5.2)
RBC # FLD: 13.4 % — SIGNIFICANT CHANGE UP (ref 10.3–14.5)
RBC CASTS # UR COMP ASSIST: 4 /HPF — SIGNIFICANT CHANGE UP (ref 0–4)
SODIUM SERPL-SCNC: 136 MMOL/L — SIGNIFICANT CHANGE UP (ref 135–145)
SP GR SPEC: 1.03 — HIGH (ref 1.01–1.02)
TROPONIN T, HIGH SENSITIVITY RESULT: 33 NG/L — SIGNIFICANT CHANGE UP (ref 0–51)
TROPONIN T, HIGH SENSITIVITY RESULT: 43 NG/L — SIGNIFICANT CHANGE UP (ref 0–51)
UROBILINOGEN FLD QL: NEGATIVE — SIGNIFICANT CHANGE UP
WBC # BLD: 9.95 K/UL — SIGNIFICANT CHANGE UP (ref 3.8–10.5)
WBC # FLD AUTO: 9.95 K/UL — SIGNIFICANT CHANGE UP (ref 3.8–10.5)
WBC UR QL: 3 /HPF — SIGNIFICANT CHANGE UP (ref 0–5)

## 2021-11-28 PROCEDURE — G1004: CPT

## 2021-11-28 PROCEDURE — 70498 CT ANGIOGRAPHY NECK: CPT | Mod: 26,QQ

## 2021-11-28 PROCEDURE — 70496 CT ANGIOGRAPHY HEAD: CPT | Mod: 26,MG

## 2021-11-28 PROCEDURE — 70450 CT HEAD/BRAIN W/O DYE: CPT | Mod: 26,59,ME

## 2021-11-28 PROCEDURE — 99285 EMERGENCY DEPT VISIT HI MDM: CPT

## 2021-11-28 PROCEDURE — 0042T: CPT

## 2021-11-28 NOTE — ED ADULT NURSE REASSESSMENT NOTE - NS ED NURSE REASSESS COMMENT FT1
Straight cath for clean urine sample. 2 RN at bedside, sterile technique maintained. 600cc dark yellow urine drained. Bed locked and lowered. Comfort and safety measures maintained.

## 2021-11-28 NOTE — CONSULT NOTE ADULT - SUBJECTIVE AND OBJECTIVE BOX
HPI: A 89 year old female with PMH of dementia, TIA, HTN, HLD, COPD, DM has presented from an assisted facility to the ED as a code stroke at 19:25 on 11/28/21 for being lethargic since 18:30 on 11/28/21. LKW is known. She was found standing in the  and was supposedly confused as well. Upon presentation in ED, pt was completely mute and found to have some effort against gravity in both of her legs. Pt is not a TPA candidate as LKW is unknown. Pt is not a thrombectomy candidate as no LVO seen on imaging. Per son, pt has been getting confused over the past year, possibly from progressive dementia. Pt denies any headache, nausea, numbness, tingling, weakness, changes in vision or hearing,     NIHSS 11 (mute, no age or month, severe dysarthria, both legs- some effort against gravity)  preMRS 3 (pt uses a walker)    Reexam NIHSS 6 (wrong month, didnt know age, legs- some effort against gravity). Pt is conversing. No aphasia or dysarthria noted.      ROS: A 10-system ROS was performed and is negative except for those items noted above and/or in the HPI.    PAST MEDICAL & SURGICAL HISTORY:  Hypertension    Transient ischemic attack (TIA)    No significant past surgical history      FAMILY HISTORY:      SOCIAL HISTORY: SOCIAL HISTORY:     Marital Status: (  )   (  ) Single  (  )   (  )      Occupation:      Lives: (  ) alone  (  ) with children   (  ) with spouse  (  ) with parents  (  ) other     Illicit Drug Use: (  ) never used  (  ) other _____     Tobacco Use:  (  ) never smoked  (  ) former smoker  (  ) current smoker  (  ) pack year  (  ) last cigarette date     Alcohol Use:      Sexual History:        MEDICATIONS  Home Medications:  alendronate 70 mg oral tablet: 1 tab(s) orally once a week (25 Dec 2020 02:48)  ascorbic acid 500 mg oral tablet: 1 tab(s) orally once a day (25 Dec 2020 02:48)  atorvastatin 20 mg oral tablet: 1 tab(s) orally once a day (at bedtime) (25 Dec 2020 02:48)  candesartan 8 mg oral tablet: 1 tab(s) orally 2 times a day (25 Dec 2020 02:48)  carBAMazepine 200 mg oral tablet: 1 tab(s) orally 3 times a day (25 Dec 2020 02:48)  cholecalciferol 2000 intl units (50 mcg) oral tablet: 1 tab(s) orally once a day (25 Dec 2020 02:48)  cyanocobalamin 1000 mcg oral tablet: 1 tab(s) orally once a day (25 Dec 2020 02:48)  levothyroxine 75 mcg (0.075 mg) oral tablet: 1 tab(s) orally every other day (on odd days) (25 Dec 2020 02:48)  levothyroxine 88 mcg (0.088 mg) oral tablet: 1 tab(s) orally once a day  ****on even days*** (25 Dec 2020 02:48)  Multiple Vitamins oral tablet: 1 tab(s) orally once a day (25 Dec 2020 02:48)      MEDICATIONS  (STANDING):    MEDICATIONS  (PRN):      ALLERGIES/INTOLERANCES:  Allergies  Ambien (Unknown)  aspirin (Unknown)  sulfa drugs (Unknown)    Intolerances      OBJECTIVE:  VITALS   Vital Signs Last 24 Hrs  T(C): 36.6 (28 Nov 2021 19:21), Max: 36.6 (28 Nov 2021 19:21)  T(F): 97.8 (28 Nov 2021 19:21), Max: 97.8 (28 Nov 2021 19:21)  HR: 78 (28 Nov 2021 19:59) (76 - 78)  BP: 152/90 (28 Nov 2021 19:59) (127/63 - 152/90)  BP(mean): --  RR: 17 (28 Nov 2021 19:59) (17 - 18)  SpO2: 95% (28 Nov 2021 19:59) (95% - 95%)    PHYSICAL EXAM:  Neurological Exam:  Mental Status: Oriented to self only.  Attention intact.  No dysarthria, aphasia or neglect.     Cranial Nerves: Pupils not reactive- bl cataracts, EOMI, VFF, no nystagmus or diplopia.  CN V1-3 intact to light touch.  No facial asymmetry.  Hearing intact.  Tongue midline.  Sternocleidomastoid and Trapezius intact bilaterally.    Motor:   Tone: normal            Strength:     Upper extremity                              Delt       Bicep    Tricep                                               R         5/5        5/5        5/5       5/5                                            L          5/5        5/5        5/5       5/5  Lower extremity                            HF          KE          KF        DF         PF                                            R        4-/5        5/5        4-/5       5/5       5/5                                            L         4-/5        5/5       4-/5       5/5        5/5  Pronator drift: none                 Dysmetria: None to finger-nose-finger or heel-shin-heel  No truncal ataxia.    Tremor: No resting, postural or action tremor.  No myoclonus.    Sensation: intact to light touch in all extremities    Deep Tendon Reflexes: 2+ bilateral biceps, triceps, brachioradialis, knee and ankle  Toes flexor bilaterally    Gait: normal and stable.      LABORATORY:  CBC                       11.8   9.95  )-----------( 295      ( 28 Nov 2021 19:44 )             35.3     Chem 11-28    136  |  101  |  20  ----------------------------<  165<H>  3.8   |  22  |  1.06    Ca    8.5      28 Nov 2021 19:44    TPro  6.3  /  Alb  4.1  /  TBili  0.3  /  DBili  x   /  AST  21  /  ALT  20  /  AlkPhos  81  11-28    LFTs LIVER FUNCTIONS - ( 28 Nov 2021 19:44 )  Alb: 4.1 g/dL / Pro: 6.3 g/dL / ALK PHOS: 81 U/L / ALT: 20 U/L / AST: 21 U/L / GGT: x           Coagulopathy PT/INR - ( 28 Nov 2021 19:44 )   PT: 12.1 sec;   INR: 1.01 ratio         PTT - ( 28 Nov 2021 19:44 )  PTT:33.0 sec  Lipid Panel   A1c   Cardiac enzymes     U/A   CSF  Immunological  Other    STUDIES & IMAGING:  Studies (EKG, EEG, EMG, etc):     Radiology (XR, CT, MR, U/S, TTE/LUIS):  CT head:  Small focal hypodensity within the left occipital lobe, concerning for recent infarct, new since 7/18/2021. No acute intracranial hemorrhage.    More sensitive evaluation for acute ischemia with brain MRI may be obtained, if no contraindications exist.      CTA Neck: No significant flow-limiting stenosis or evidence of acute dissection within the cervical carotid or vertebral arteries.    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    CT Perfusion: Perfusion imaging predicts no core infarct. There is a predicted volume of 99 mL penumbra of tissue at risk, predominantly located within the left PCA territory.   HPI: A 89 year old female with PMH of dementia, TIA, HTN, HLD, COPD, DM has presented from an assisted facility to the ED as a code stroke at 19:25 on 11/28/21 for being lethargic since 18:30 on 11/28/21. LKW is known. She was found standing in the  and was supposedly confused as well. Upon presentation in ED, pt was completely mute and found to have some effort against gravity in both of her legs. Pt is not a TPA candidate as LKW is unknown. Pt is not a thrombectomy candidate as no LVO seen on imaging. Per son, pt has been getting confused over the past year, possibly from progressive dementia. Pt denies any headache, nausea, numbness, tingling, weakness, changes in vision or hearing,     NIHSS 11 (mute, no age or month, severe dysarthria, both legs- some effort against gravity)  preMRS 3 (pt uses a walker)    Reexam NIHSS 6 (wrong month, didnt know age, legs- some effort against gravity). Pt is conversing. No aphasia or dysarthria noted.      ROS: A 10-system ROS was performed and is negative except for those items noted above and/or in the HPI.    PAST MEDICAL & SURGICAL HISTORY:  Hypertension    Transient ischemic attack (TIA)    No significant past surgical history      FAMILY HISTORY:      SOCIAL HISTORY: SOCIAL HISTORY:     Marital Status: (  )   (  ) Single  (  )   (  )      Occupation:      Lives: (  ) alone  (  ) with children   (  ) with spouse  (  ) with parents  (  ) other     Illicit Drug Use: (  ) never used  (  ) other _____     Tobacco Use:  (  ) never smoked  (  ) former smoker  (  ) current smoker  (  ) pack year  (  ) last cigarette date     Alcohol Use:      Sexual History:        MEDICATIONS  Home Medications:  alendronate 70 mg oral tablet: 1 tab(s) orally once a week (25 Dec 2020 02:48)  ascorbic acid 500 mg oral tablet: 1 tab(s) orally once a day (25 Dec 2020 02:48)  atorvastatin 20 mg oral tablet: 1 tab(s) orally once a day (at bedtime) (25 Dec 2020 02:48)  candesartan 8 mg oral tablet: 1 tab(s) orally 2 times a day (25 Dec 2020 02:48)  carBAMazepine 200 mg oral tablet: 1 tab(s) orally 3 times a day (25 Dec 2020 02:48)  cholecalciferol 2000 intl units (50 mcg) oral tablet: 1 tab(s) orally once a day (25 Dec 2020 02:48)  cyanocobalamin 1000 mcg oral tablet: 1 tab(s) orally once a day (25 Dec 2020 02:48)  levothyroxine 75 mcg (0.075 mg) oral tablet: 1 tab(s) orally every other day (on odd days) (25 Dec 2020 02:48)  levothyroxine 88 mcg (0.088 mg) oral tablet: 1 tab(s) orally once a day  ****on even days*** (25 Dec 2020 02:48)  Multiple Vitamins oral tablet: 1 tab(s) orally once a day (25 Dec 2020 02:48)      MEDICATIONS  (STANDING):    MEDICATIONS  (PRN):      ALLERGIES/INTOLERANCES:  Allergies  Ambien (Unknown)  aspirin (Unknown)  sulfa drugs (Unknown)    Intolerances      OBJECTIVE:  VITALS   Vital Signs Last 24 Hrs  T(C): 36.6 (28 Nov 2021 19:21), Max: 36.6 (28 Nov 2021 19:21)  T(F): 97.8 (28 Nov 2021 19:21), Max: 97.8 (28 Nov 2021 19:21)  HR: 78 (28 Nov 2021 19:59) (76 - 78)  BP: 152/90 (28 Nov 2021 19:59) (127/63 - 152/90)  BP(mean): --  RR: 17 (28 Nov 2021 19:59) (17 - 18)  SpO2: 95% (28 Nov 2021 19:59) (95% - 95%)    PHYSICAL EXAM:  Resp: BLAE,   Cardio: No M/R/G.    Neurological Exam:  Mental Status: Oriented to self only.  Attention intact.  No dysarthria, aphasia or neglect.     Cranial Nerves: Pupils not reactive- bl cataracts, EOMI, VFF, no nystagmus or diplopia.  CN V1-3 intact to light touch.  No facial asymmetry.  Hearing intact.  Tongue midline.  Sternocleidomastoid and Trapezius intact bilaterally.    Motor:   Tone: normal            Strength:     Upper extremity                              Delt       Bicep    Tricep                                               R         5/5        5/5        5/5       5/5                                            L          5/5        5/5        5/5       5/5  Lower extremity                            HF          KE          KF        DF         PF                                            R        4-/5        5/5        4-/5       5/5       5/5                                            L         4-/5        5/5       4-/5       5/5        5/5  Pronator drift: none                 Dysmetria: None to finger-nose-finger or heel-shin-heel  No truncal ataxia.    Tremor: No resting, postural or action tremor.  No myoclonus.    Sensation: intact to light touch in all extremities    Deep Tendon Reflexes: 2+ bilateral biceps, triceps, brachioradialis, knee and ankle  Toes flexor bilaterally    Gait: normal and stable.      LABORATORY:  CBC                       11.8   9.95  )-----------( 295      ( 28 Nov 2021 19:44 )             35.3     Chem 11-28    136  |  101  |  20  ----------------------------<  165<H>  3.8   |  22  |  1.06    Ca    8.5      28 Nov 2021 19:44    TPro  6.3  /  Alb  4.1  /  TBili  0.3  /  DBili  x   /  AST  21  /  ALT  20  /  AlkPhos  81  11-28    LFTs LIVER FUNCTIONS - ( 28 Nov 2021 19:44 )  Alb: 4.1 g/dL / Pro: 6.3 g/dL / ALK PHOS: 81 U/L / ALT: 20 U/L / AST: 21 U/L / GGT: x           Coagulopathy PT/INR - ( 28 Nov 2021 19:44 )   PT: 12.1 sec;   INR: 1.01 ratio         PTT - ( 28 Nov 2021 19:44 )  PTT:33.0 sec  Lipid Panel   A1c   Cardiac enzymes     U/A   CSF  Immunological  Other    STUDIES & IMAGING:  Studies (EKG, EEG, EMG, etc):     Radiology (XR, CT, MR, U/S, TTE/LUIS):  CT head:  Small focal hypodensity within the left occipital lobe, concerning for recent infarct, new since 7/18/2021. No acute intracranial hemorrhage.    More sensitive evaluation for acute ischemia with brain MRI may be obtained, if no contraindications exist.      CTA Neck: No significant flow-limiting stenosis or evidence of acute dissection within the cervical carotid or vertebral arteries.    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    CT Perfusion: Perfusion imaging predicts no core infarct. There is a predicted volume of 99 mL penumbra of tissue at risk, predominantly located within the left PCA territory.

## 2021-11-28 NOTE — ED PROVIDER NOTE - OBJECTIVE STATEMENT
Attn - pt seen in [facility]A - pt BIB EMS from [facility] - pt has hx of dementia and not able to provide reliable hx.  Hx from EMS and pt's son.  pt was standing in  confused.  no focal neuro complaints or signs.  pt does not recall event.  Son reports pt has hx of TIAs, but does not know symptoms.  pt is a retire psychologist.  Code Stroke called on pt's arrival.

## 2021-11-28 NOTE — ED ADULT NURSE NOTE - OBJECTIVE STATEMENT
89 year old female presents to ED via EMS from the [facility] complaining of AMS. Per EMS [facility] staff found her wandering in the  confused, therefore called 911. Upon EMS arrival pt was not following commands and was nonverbal, EMS states they were unable to complete stroke scale. Upon arrival to ED pt was following verbal commands but nonverbal. Code stroke called. Pt brought to CT from ambulance bay. After CT Scan, pt verbal, able to state name and where she is, but does not remember why she was brought to the hospital. A&O x2, strong x4 extremities, following commands. Pt has no complaints, denies pain, and states she physically feels her normal self. Son at bedside states on Thanksgiving she was at baseline, but now seems a little more confused than normal. Pending CT results. Bed locked and lowered. Comfort and safety measures maintained.

## 2021-11-28 NOTE — CONSULT NOTE ADULT - ATTENDING COMMENTS
code stroke called in ED and neurology emergently assessed patient.   Briefly,  89 year old female with  dementia, TIA, HTN, HLD, COPD, TGN (on carbemazepine) DM has presented from an assisted facility to the ED as a code stroke at 19:25 on 11/28/21 for being lethargic since 18:30 on 11/28/21. LKW is known. She was found standing in the  and was supposedly confused as well. Upon presentation in ED, pt was completely mute and found to have some effort against gravity in both of her legs.   Pt is not a TPA candidate as LKW is unknown.   Pt is not a thrombectomy candidate as no LVO seen on imaging.   Per son, pt has been getting confused over the past year, possibly from progressive dementia. Pt denies any headache, nausea, numbness, tingling, weakness, changes in vision or hearing,     NIHSS 11 (mute, no age or month, severe dysarthria, both legs- some effort against gravity)  preMRS 3 (pt uses a walker)  Reexam NIHSS 6 (wrong month, didnt know age, legs- some effort against gravity). Pt is conversing. No aphasia or dysarthria noted.    CT head showed small focal hypodensity within left occipital lobe, concerning for new infarct.   CTA head showed severe short segment stenosis vs. subtotal occlusion of L PCA at P1/P2 junction. 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 CT perfusion showed f 99 mL penumbra of tissue at risk, predominantly located within the left PCA territory.  TSH elevated 24, B12 WNL.   Impression: AMS may be secondary to toxic metabolic encephalopathy vs progression of neurodegenerative condition (ie dementia). Stroke on CTH is of competing mechanism; either ICAD vs ESUS.      Recommendations:  - f/u TFTs, TSH elevated   - Dual antiplatelet therapy with ASA 81mg PO daily and Clopidogrel 75mg PO daily x 3 weeks followed by monotherapy with ASA 81mg PO daily.  - High dose statin therapy - atorvastatin 40mg PO daily. LDL goal <70mg/dL.  - MRI brain w/o  - Hemoglobin A1c and lipid panel  - TTE  - nsx recs apprecaited.   - telemetry  - likely needs ILR   - PT/OT/SS/SLP, OOBC  - BP normotensive   - check FS, glucose control <180  - GI/DVT ppx  - Counseling on diet, exercise, and medication adherence was done  - Counseling on smoking cessation and alcohol consumption offered when appropriate.  - Pain assessed and judicious use of narcotics when appropriate was discussed.    - Stroke education given when appropriate.  - Importance of fall prevention discussed.   - Differential diagnosis and plan of care discussed with patient and/or family and primary team  - Thank you for allowing me to participate in the care of this patient. Call with questions.  Gregory Bonilla MD  Vascular Neurology

## 2021-11-28 NOTE — ED ADULT NURSE NOTE - NSIMPLEMENTINTERV_GEN_ALL_ED
Implemented All Fall with Harm Risk Interventions:  [facility] to call system. Call bell, personal items and telephone within reach. Instruct patient to call for assistance. Room bathroom lighting operational. Non-slip footwear when patient is off stretcher. Physically safe environment: no spills, clutter or unnecessary equipment. Stretcher in lowest position, wheels locked, appropriate side rails in place. Provide visual cue, wrist band, yellow gown, etc. Monitor gait and stability. Monitor for mental status changes and reorient to person, place, and time. Review medications for side effects contributing to fall risk. Reinforce activity limits and safety measures with patient and family. Provide visual clues: red socks.

## 2021-11-28 NOTE — ED ADULT NURSE NOTE - NSFALLRSKUNASSIST_ED_ALL_ED
Patient was seen last Friday by Ty Otero with the patient and she is waiting on her COVID results no

## 2021-11-28 NOTE — ED ADULT NURSE REASSESSMENT NOTE - NS ED NURSE REASSESS COMMENT FT1
Pt A&Ox0 at this time, does not know name, place, and year. MD Moore made aware. Bed locked and lowered. Comfort and safety measures maintained.

## 2021-11-28 NOTE — ED PROVIDER NOTE - PHYSICAL EXAMINATION
Attn - alert, NAD, no pallor or jaundice, PERRL 3 mm, moist mm, skin - warm and dry, Lungs - clear, no w/r/r, good BS bilaterally, Cor - rr, no M, no rub, Abdo - distended, soft, NT, no HSM, no CVAT, no guarding or rebound. Extremities - no edema, no calf tenderness, distal pulses intact and symmetrical, Neuro - intact and non-focal

## 2021-11-28 NOTE — ED PROVIDER NOTE - CLINICAL SUMMARY MEDICAL DECISION MAKING FREE TEXT BOX
Attn - pt with reported increased confusion.  Son states pt is back to her baseline dementia.  no recent illness.  DDx - TIA v infection v metabolic,

## 2021-11-28 NOTE — CONSULT NOTE ADULT - ASSESSMENT
A 89 year old female with PMH of dementia, TIA, HTN, HLD, COPD, DM has presented from an assisted facility to the ED as a code stroke at 19:25 on 11/28/21 for being lethargic since 18:30 on 11/28/21. LKW is known. She was found standing in the  and was supposedly confused as well. Upon presentation in ED, pt was completely mute and found to have some effort against gravity in both of her legs. Pt is not a TPA candidate as LKW is unknown. Pt is not a thrombectomy candidate as no LVO seen on imaging. Per son, pt has been getting confused over the past year, possibly from progressive dementia. Pt denies any headache, nausea, numbness, tingling, weakness, changes in vision or hearing,     NIHSS 11 (mute, no age or month, severe dysarthria, both legs- some effort against gravity)  preMRS 3 (pt uses a walker)    Reexam NIHSS 6 (wrong month, didnt know age, legs- some effort against gravity). Pt is conversing. No aphasia or dysarthria noted.    Neurological exam reveal 4- bilateral hip flexion and knee flexion. Pupils nonreactive due to cataracts.     Impression: mute, no age or month, severe dysarthria possibly 2/2 progressive dementia vs. tia 2/2 likely toxic/metabolic/infectious etiology    Recommendations:  []Case discussed with stroke fellow Dr. Susie Levy who did not recommend admission to stroke stroke as no real infarct was appreciated on imaging.  []Would recommend toxic/metabolic/infectious workup  []Fall precautions  []NPO except meds until dysphagia screen passed    Case discussed with stroke fellow Dr. Susie Levy under supervision of stroke attending Dr. Bonilla A 89 year old female with PMH of dementia, TIA, HTN, HLD, COPD, DM has presented from an assisted facility to the ED as a code stroke at 19:25 on 11/28/21 for being lethargic since 18:30 on 11/28/21. LKW is known. She was found standing in the  and was supposedly confused as well. Upon presentation in ED, pt was completely mute and found to have some effort against gravity in both of her legs. Pt is not a TPA candidate as LKW is unknown. Pt is not a thrombectomy candidate as no LVO seen on imaging. Per son, pt has been getting confused over the past year, possibly from progressive dementia. Pt denies any headache, nausea, numbness, tingling, weakness, changes in vision or hearing,     NIHSS 11 (mute, no age or month, severe dysarthria, both legs- some effort against gravity)  preMRS 3 (pt uses a walker)    Reexam NIHSS 6 (wrong month, didnt know age, legs- some effort against gravity). Pt is conversing. No aphasia or dysarthria noted.    Neurological exam reveal 4- bilateral hip flexion and knee flexion. Pupils nonreactive due to cataracts.     Impression: mute, no age or month, severe dysarthria possibly 2/2 progressive dementia vs. tia 2/2 likely toxic/metabolic/infectious etiology    Recommendations:  []Case discussed with stroke fellow Dr. Susie Levy who did not recommend admission to stroke stroke as no real infarct was appreciated on imaging.  []Would recommend toxic/metabolic/infectious workup  []Fall precautions  []NPO except meds until dysphagia screen passed  []General neurology attending will see patient in the AM    Case discussed with stroke fellow Dr. Susie Levy under supervision of stroke attending Dr. Bonilla and general neurology attending Dr. Simmons. A 89 year old female with PMH of dementia, TIA, HTN, HLD, COPD, DM has presented from an assisted facility to the ED as a code stroke at 19:25 on 11/28/21 for being lethargic since 18:30 on 11/28/21. LKW is known. She was found standing in the  and was supposedly confused as well. Upon presentation in ED, pt was completely mute and found to have some effort against gravity in both of her legs. Pt is not a TPA candidate as LKW is unknown. Pt is not a thrombectomy candidate as no LVO seen on imaging. Per son, pt has been getting confused over the past year, possibly from progressive dementia. Pt denies any headache, nausea, numbness, tingling, weakness, changes in vision or hearing,     NIHSS 11 (mute, no age or month, severe dysarthria, both legs- some effort against gravity)  preMRS 3 (pt uses a walker)    Reexam NIHSS 6 (wrong month, didnt know age, legs- some effort against gravity). Pt is conversing. No aphasia or dysarthria noted.    Neurological exam reveal 4- bilateral hip flexion and knee flexion. Pupils nonreactive due to cataracts.     Impression: mute, no age or month, severe dysarthria possibly 2/2 progressive dementia vs. tia vs toxic/metabolic/infectious etiology    Recommendations:  []Case discussed with stroke fellow Dr. Susie Levy who did not recommend admission to stroke stroke as no real infarct was appreciated on imaging.  []Would recommend toxic/metabolic/infectious workup  []Fall precautions  []NPO except meds until dysphagia screen passed  []General neurology attending will see patient in the AM    Case discussed with stroke fellow Dr. Susie Levy under supervision of stroke attending Dr. Bonilla and general neurology attending Dr. Simmons. A 89 year old female with PMH of dementia, TIA, HTN, HLD, COPD, DM has presented from an assisted facility to the ED as a code stroke at 19:25 on 11/28/21 for being lethargic since 18:30 on 11/28/21. LKW is known. She was found standing in the  and was supposedly confused as well. Upon presentation in ED, pt was completely mute and found to have some effort against gravity in both of her legs. Pt is not a TPA candidate as LKW is unknown. Pt is not a thrombectomy candidate as no LVO seen on imaging. Per son, pt has been getting confused over the past year, possibly from progressive dementia. Pt denies any headache, nausea, numbness, tingling, weakness, changes in vision or hearing,     NIHSS 11 (mute, no age or month, severe dysarthria, both legs- some effort against gravity)  preMRS 3 (pt uses a walker)    Reexam NIHSS 6 (wrong month, didnt know age, legs- some effort against gravity). Pt is conversing. No aphasia or dysarthria noted.    Neurological exam reveal 4- bilateral hip flexion and knee flexion. Pupils nonreactive due to cataracts.     ABCD2 score: 6     Impression: mute, no age or month, severe dysarthria possibly 2/2 progressive dementia vs. tia vs toxic/metabolic/infectious etiology    Recommendations:  []Case discussed with stroke fellow Dr. Susie Levy who did not recommend admission to stroke stroke as no real infarct was appreciated on imaging.  []MRI brain w/o contrast  []Would recommend toxic/metabolic/infectious workup  []Fall precautions  []NPO except meds until dysphagia screen passed  []General neurology attending will see patient in the AM    Case discussed with stroke fellow Dr. Susie Levy under supervision of stroke attending Dr. Bonilla and general neurology attending Dr. Simmons. A 89 year old female with PMH of dementia, TIA, HTN, HLD, COPD, DM has presented from an assisted facility to the ED as a code stroke at 19:25 on 11/28/21 for being lethargic since 18:30 on 11/28/21. LKW is known. She was found standing in the  and was supposedly confused as well. Upon presentation in ED, pt was completely mute and found to have some effort against gravity in both of her legs. Pt is not a TPA candidate as LKW is unknown. Pt is not a thrombectomy candidate as no LVO seen on imaging. Per son, pt has been getting confused over the past year, possibly from progressive dementia. Pt denies any headache, nausea, numbness, tingling, weakness, changes in vision or hearing,     NIHSS 11 (mute, no age or month, severe dysarthria, both legs- some effort against gravity)  preMRS 3 (pt uses a walker)    Reexam NIHSS 6 (wrong month, didnt know age, legs- some effort against gravity). Pt is conversing. No aphasia or dysarthria noted.    Neurological exam reveal 4- bilateral hip flexion and knee flexion. Pupils nonreactive due to cataracts.     ABCD2 score: 6     Impression: mute, no age or month, severe dysarthria possibly 2/2 progressive dementia vs. tia vs toxic/metabolic/infectious etiology    Recommendations:  []Case discussed with stroke fellow Dr. Susie Levy who did not recommend admission to stroke stroke as no real infarct was appreciated on imaging.  []MRI brain w/o contrast  []Daily ASA 81 mg   []Would recommend toxic/metabolic/infectious workup  []Fall precautions  []NPO except meds until dysphagia screen passed  []General neurology attending will see patient in the AM    Case discussed with stroke fellow Dr. Susie Levy under supervision of stroke attending Dr. Bonilla and general neurology attending Dr. Simmons. A 89 year old female with PMH of dementia, TIA, HTN, HLD, COPD, DM has presented from an assisted facility to the ED as a code stroke at 19:25 on 11/28/21 for being lethargic since 18:30 on 11/28/21. LKW is known. She was found standing in the  and was supposedly confused as well. Upon presentation in ED, pt was completely mute and found to have some effort against gravity in both of her legs. Pt is not a TPA candidate as LKW is unknown. Pt is not a thrombectomy candidate as no LVO seen on imaging. Per son, pt has been getting confused over the past year, possibly from progressive dementia. Pt denies any headache, nausea, numbness, tingling, weakness, changes in vision or hearing,     NIHSS 11 (mute, no age or month, severe dysarthria, both legs- some effort against gravity)  preMRS 3 (pt uses a walker)    Reexam NIHSS 6 (wrong month, didnt know age, legs- some effort against gravity). Pt is conversing. No aphasia or dysarthria noted.    Neurological exam reveal 4- bilateral hip flexion and knee flexion. Pupils nonreactive due to cataracts.     ABCD2 score: 6     Impression: mute, no age or month, severe dysarthria possibly 2/2 progressive dementia vs. tia vs toxic/metabolic/infectious etiology    Recommendations:  []Case discussed with stroke fellow Dr. Susie Levy who did not recommend admission to stroke stroke as no real infarct was appreciated on imaging.  []MRI brain w/o contrast  []Daily ASA 81 mg   []Would recommend toxic/metabolic/infectious workup  []would recommend dehydration  []TSH, T4, B1, B12, folate   []Fall precautions  []NPO except meds until dysphagia screen passed  []General neurology attending will see patient in the AM    Case discussed with stroke fellow Dr. Susie Levy under supervision of stroke attending Dr. Bonilla and general neurology attending Dr. Simmons. A 89 year old female with PMH of dementia, TIA, HTN, HLD, COPD, DM has presented from an assisted facility to the ED as a code stroke at 19:25 on 11/28/21 for being lethargic since 18:30 on 11/28/21. LKW is known. She was found standing in the  and was supposedly confused as well. Upon presentation in ED, pt was completely mute and found to have some effort against gravity in both of her legs. Pt is not a TPA candidate as LKW is unknown. Pt is not a thrombectomy candidate as no LVO seen on imaging. Per son, pt has been getting confused over the past year, possibly from progressive dementia. Pt denies any headache, nausea, numbness, tingling, weakness, changes in vision or hearing,     NIHSS 11 (mute, no age or month, severe dysarthria, both legs- some effort against gravity)  preMRS 3 (pt uses a walker)    Reexam NIHSS 6 (wrong month, didnt know age, legs- some effort against gravity). Pt is conversing. No aphasia or dysarthria noted.    Neurological exam reveal 4- bilateral hip flexion and knee flexion. Pupils nonreactive due to cataracts.     ABCD2 score: 6     Impression: mute, no age or month, severe dysarthria possibly 2/2 progressive dementia vs. tia vs toxic/metabolic/infectious etiology    Recommendations:  []Case discussed with stroke fellow Dr. Susie Levy who did not recommend admission to stroke stroke as no real infarct was appreciated on imaging.  []MRI brain w/o contrast  []Daily ASA 81 mg   []Would recommend toxic/metabolic/infectious workup  []would recommend dehydration  []TSH, T4, B1, B12, folate   []Fall precautions  []NPO except meds until dysphagia screen passed  []General neurology attending will see patient in the AM    Case discussed with stroke fellow Dr. Susie Levy under supervision of stroke attending Dr. Bonilla. Pt to be seen by general neurology attending Dr. Simmons in the AM. A 89 year old female with PMH of dementia, TIA, HTN, HLD, COPD, DM has presented from an assisted facility to the ED as a code stroke at 19:25 on 11/28/21 for being lethargic since 18:30 on 11/28/21. LKW is known. She was found standing in the  and was supposedly confused as well. Upon presentation in ED, pt was completely mute and found to have some effort against gravity in both of her legs. Pt is not a TPA candidate as LKW is unknown. Pt is not a thrombectomy candidate as no LVO seen on imaging. Per son, pt has been getting confused over the past year, possibly from progressive dementia. Pt denies any headache, nausea, numbness, tingling, weakness, changes in vision or hearing,     NIHSS 11 (mute, no age or month, severe dysarthria, both legs- some effort against gravity)  preMRS 3 (pt uses a walker)    Reexam NIHSS 6 (wrong month, didnt know age, legs- some effort against gravity). Pt is conversing. No aphasia or dysarthria noted.    Neurological exam reveal 4-/5 bilateral hip flexion and knee flexion. Pupils nonreactive due to cataracts. CT head showed small focal hypodensity within left occipital lobe, concerning for new infarct. CTA head showed severe short segment stenosis vs. subtotal occlusion of L PCA at P1/P2 junction. 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 CT perfusion showed f 99 mL penumbra of tissue at risk, predominantly located within the left PCA territory.    ABCD2 score: 6     Impression: mute, no age or month, severe dysarthria possibly 2/2 progressive dementia vs. tia vs toxic/metabolic/infectious etiology    Recommendations:  []Case discussed with stroke fellow Dr. Susie Leyv who did not recommend admission to stroke stroke as no real infarct was appreciated on imaging.  []MRI brain w/o contrast  []Daily ASA 81 mg   []Would recommend toxic/metabolic/infectious workup  []would recommend dehydration  []TSH, T4, B1, B12, folate   []Fall precautions  []NPO except meds until dysphagia screen passed  []General neurology attending will see patient in the AM    Case discussed with stroke fellow Dr. Susie Levy under supervision of stroke attending Dr. Bonilla. Pt to be seen by general neurology attending Dr. Simmons in the AM. A 89 year old female with PMH of dementia, TIA, HTN, HLD, COPD, DM has presented from an assisted facility to the ED as a code stroke at 19:25 on 11/28/21 for being lethargic since 18:30 on 11/28/21. LKW is known. She was found standing in the  and was supposedly confused as well. Upon presentation in ED, pt was completely mute and found to have some effort against gravity in both of her legs. Pt is not a TPA candidate as LKW is unknown. Pt is not a thrombectomy candidate as no LVO seen on imaging. Per son, pt has been getting confused over the past year, possibly from progressive dementia. Pt denies any headache, nausea, numbness, tingling, weakness, changes in vision or hearing,     NIHSS 11 (mute, no age or month, severe dysarthria, both legs- some effort against gravity)  preMRS 3 (pt uses a walker)    Reexam NIHSS 6 (wrong month, didnt know age, legs- some effort against gravity). Pt is conversing. No aphasia or dysarthria noted.    Neurological exam reveal 4-/5 bilateral hip flexion and knee flexion. Pupils nonreactive due to cataracts. CT head showed small focal hypodensity within left occipital lobe, concerning for new infarct. CTA head showed severe short segment stenosis vs. subtotal occlusion of L PCA at P1/P2 junction. 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 CT perfusion showed f 99 mL penumbra of tissue at risk, predominantly located within the left PCA territory.    ABCD2 score: 6     Impression: mute, no age or month, severe dysarthria possibly 2/2 progressive dementia vs. tia vs toxic/metabolic/infectious etiology    Recommendations:  []Case discussed with stroke fellow Dr. Susie Levy who did not recommend admission to stroke stroke as no real infarct was appreciated on imaging.  []MRI brain w/o contrast  []Daily ASA 81 mg   []Would recommend toxic/metabolic/infectious workup  []would recommend dehydration  []TSH, T4, B1, B12, folate   []Fall precautions  []NPO except meds until dysphagia screen passed  []General neurology attending will see patient in the AM    Case discussed with stroke fellow Dr. Susie Levy under supervision of stroke attending Dr. Bonilla.

## 2021-11-29 DIAGNOSIS — E03.9 HYPOTHYROIDISM, UNSPECIFIED: ICD-10-CM

## 2021-11-29 DIAGNOSIS — G50.0 TRIGEMINAL NEURALGIA: ICD-10-CM

## 2021-11-29 DIAGNOSIS — R41.0 DISORIENTATION, UNSPECIFIED: ICD-10-CM

## 2021-11-29 DIAGNOSIS — I66.22 OCCLUSION AND STENOSIS OF LEFT POSTERIOR CEREBRAL ARTERY: ICD-10-CM

## 2021-11-29 DIAGNOSIS — I16.0 HYPERTENSIVE URGENCY: ICD-10-CM

## 2021-11-29 LAB
FOLATE SERPL-MCNC: >20 NG/ML — SIGNIFICANT CHANGE UP
SARS-COV-2 RNA SPEC QL NAA+PROBE: SIGNIFICANT CHANGE UP
T4 FREE SERPL-MCNC: 1 NG/DL — SIGNIFICANT CHANGE UP (ref 0.9–1.8)
TSH SERPL-MCNC: 12.6 UIU/ML — HIGH (ref 0.27–4.2)
TSH SERPL-MCNC: 24 UIU/ML — HIGH (ref 0.27–4.2)
VIT B12 SERPL-MCNC: 1082 PG/ML — SIGNIFICANT CHANGE UP (ref 232–1245)

## 2021-11-29 PROCEDURE — 99222 1ST HOSP IP/OBS MODERATE 55: CPT

## 2021-11-29 PROCEDURE — 99223 1ST HOSP IP/OBS HIGH 75: CPT

## 2021-11-29 RX ORDER — CHOLECALCIFEROL (VITAMIN D3) 125 MCG
1 CAPSULE ORAL
Qty: 0 | Refills: 0 | DISCHARGE

## 2021-11-29 RX ORDER — CARBAMAZEPINE 200 MG
1 TABLET ORAL
Qty: 0 | Refills: 0 | DISCHARGE

## 2021-11-29 RX ORDER — ATORVASTATIN CALCIUM 80 MG/1
20 TABLET, FILM COATED ORAL AT BEDTIME
Refills: 0 | Status: DISCONTINUED | OUTPATIENT
Start: 2021-11-29 | End: 2021-11-29

## 2021-11-29 RX ORDER — LOSARTAN POTASSIUM 100 MG/1
25 TABLET, FILM COATED ORAL
Refills: 0 | Status: DISCONTINUED | OUTPATIENT
Start: 2021-11-29 | End: 2021-11-30

## 2021-11-29 RX ORDER — AMLODIPINE BESYLATE 2.5 MG/1
10 TABLET ORAL DAILY
Refills: 0 | Status: DISCONTINUED | OUTPATIENT
Start: 2021-11-29 | End: 2021-12-02

## 2021-11-29 RX ORDER — LOSARTAN POTASSIUM 100 MG/1
25 TABLET, FILM COATED ORAL DAILY
Refills: 0 | Status: DISCONTINUED | OUTPATIENT
Start: 2021-11-29 | End: 2021-11-29

## 2021-11-29 RX ORDER — ATORVASTATIN CALCIUM 80 MG/1
1 TABLET, FILM COATED ORAL
Qty: 0 | Refills: 0 | DISCHARGE

## 2021-11-29 RX ORDER — FERROUS SULFATE 325(65) MG
325 TABLET ORAL DAILY
Refills: 0 | Status: DISCONTINUED | OUTPATIENT
Start: 2021-11-29 | End: 2021-12-02

## 2021-11-29 RX ORDER — CLOPIDOGREL BISULFATE 75 MG/1
75 TABLET, FILM COATED ORAL DAILY
Refills: 0 | Status: DISCONTINUED | OUTPATIENT
Start: 2021-11-29 | End: 2021-12-02

## 2021-11-29 RX ORDER — ALENDRONATE SODIUM 70 MG/1
1 TABLET ORAL
Qty: 0 | Refills: 0 | DISCHARGE

## 2021-11-29 RX ORDER — ASCORBIC ACID 60 MG
1 TABLET,CHEWABLE ORAL
Qty: 0 | Refills: 0 | DISCHARGE

## 2021-11-29 RX ORDER — LEVOTHYROXINE SODIUM 125 MCG
125 TABLET ORAL DAILY
Refills: 0 | Status: DISCONTINUED | OUTPATIENT
Start: 2021-11-29 | End: 2021-12-02

## 2021-11-29 RX ORDER — LEVOTHYROXINE SODIUM 125 MCG
1 TABLET ORAL
Qty: 0 | Refills: 0 | DISCHARGE

## 2021-11-29 RX ORDER — PREGABALIN 225 MG/1
1 CAPSULE ORAL
Qty: 0 | Refills: 0 | DISCHARGE

## 2021-11-29 RX ORDER — CHOLECALCIFEROL (VITAMIN D3) 125 MCG
2000 CAPSULE ORAL DAILY
Refills: 0 | Status: DISCONTINUED | OUTPATIENT
Start: 2021-11-29 | End: 2021-12-02

## 2021-11-29 RX ORDER — AMLODIPINE BESYLATE 2.5 MG/1
5 TABLET ORAL DAILY
Refills: 0 | Status: DISCONTINUED | OUTPATIENT
Start: 2021-11-29 | End: 2021-11-29

## 2021-11-29 RX ORDER — ATORVASTATIN CALCIUM 80 MG/1
40 TABLET, FILM COATED ORAL AT BEDTIME
Refills: 0 | Status: DISCONTINUED | OUTPATIENT
Start: 2021-11-29 | End: 2021-12-02

## 2021-11-29 RX ORDER — CARBAMAZEPINE 200 MG
200 TABLET ORAL THREE TIMES A DAY
Refills: 0 | Status: DISCONTINUED | OUTPATIENT
Start: 2021-11-29 | End: 2021-12-02

## 2021-11-29 RX ORDER — ENOXAPARIN SODIUM 100 MG/ML
40 INJECTION SUBCUTANEOUS DAILY
Refills: 0 | Status: DISCONTINUED | OUTPATIENT
Start: 2021-11-29 | End: 2021-12-02

## 2021-11-29 RX ORDER — ACETAMINOPHEN 500 MG
650 TABLET ORAL EVERY 6 HOURS
Refills: 0 | Status: DISCONTINUED | OUTPATIENT
Start: 2021-11-29 | End: 2021-12-02

## 2021-11-29 RX ORDER — CANDESARTAN CILEXETIL 8 MG/1
1 TABLET ORAL
Qty: 0 | Refills: 0 | DISCHARGE

## 2021-11-29 RX ADMIN — Medication 325 MILLIGRAM(S): at 13:27

## 2021-11-29 RX ADMIN — AMLODIPINE BESYLATE 10 MILLIGRAM(S): 2.5 TABLET ORAL at 08:23

## 2021-11-29 RX ADMIN — Medication 125 MICROGRAM(S): at 05:31

## 2021-11-29 RX ADMIN — Medication 200 MILLIGRAM(S): at 13:27

## 2021-11-29 RX ADMIN — Medication 1 TABLET(S): at 13:27

## 2021-11-29 RX ADMIN — Medication 200 MILLIGRAM(S): at 22:54

## 2021-11-29 RX ADMIN — Medication 200 MILLIGRAM(S): at 05:31

## 2021-11-29 RX ADMIN — LOSARTAN POTASSIUM 25 MILLIGRAM(S): 100 TABLET, FILM COATED ORAL at 00:57

## 2021-11-29 RX ADMIN — AMLODIPINE BESYLATE 5 MILLIGRAM(S): 2.5 TABLET ORAL at 04:29

## 2021-11-29 RX ADMIN — ATORVASTATIN CALCIUM 40 MILLIGRAM(S): 80 TABLET, FILM COATED ORAL at 22:54

## 2021-11-29 RX ADMIN — LOSARTAN POTASSIUM 25 MILLIGRAM(S): 100 TABLET, FILM COATED ORAL at 05:23

## 2021-11-29 RX ADMIN — LOSARTAN POTASSIUM 25 MILLIGRAM(S): 100 TABLET, FILM COATED ORAL at 17:04

## 2021-11-29 RX ADMIN — CLOPIDOGREL BISULFATE 75 MILLIGRAM(S): 75 TABLET, FILM COATED ORAL at 13:27

## 2021-11-29 RX ADMIN — ENOXAPARIN SODIUM 40 MILLIGRAM(S): 100 INJECTION SUBCUTANEOUS at 13:27

## 2021-11-29 RX ADMIN — Medication 2000 UNIT(S): at 13:27

## 2021-11-29 NOTE — H&P ADULT - PROBLEM SELECTOR PLAN 2
CT identified severe short segment stenosis versus subtotal occlusion of the left posterior cerebral artery at the P1/P2 junction. 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  CT perfusion study found predicted volume of 99 mL penumbra of tissue at risk, predominantly located within the left PCA territory.  It's Patient is symptomatic with SBP in 190s-200s/100s  Pt was given losartan 25mg PO x1.   Will continue losartan 25mg PO BID (equivalent to home candesartan dose). Patient is symptomatic with SBP in 190s/100s  Pt was given losartan 25mg PO x1.   Will continue losartan 25mg PO BID (equivalent to home candesartan dose). Uptitrate if necessary.

## 2021-11-29 NOTE — H&P ADULT - NSICDXPASTMEDICALHX_GEN_ALL_CORE_FT
PAST MEDICAL HISTORY:  Chronic obstructive pulmonary disease (COPD)     Dementia     HLD (hyperlipidemia)     Hypertension     Hypothyroidism     T2DM (type 2 diabetes mellitus)     Transient ischemic attack (TIA)     Trigeminal neuralgia

## 2021-11-29 NOTE — ED ADULT NURSE REASSESSMENT NOTE - NS ED NURSE REASSESS COMMENT FT1
Paged MD Gisela carrillo this time to make aware of persistent hypertension after medication. Waiting for call  back

## 2021-11-29 NOTE — H&P ADULT - NSHPPHYSICALEXAM_GEN_ALL_CORE
T(C): 36.8 (11-29-21 @ 00:12), Max: 36.8 (11-29-21 @ 00:12)  HR: 86 (11-29-21 @ 00:12) (73 - 86)  BP: 195/105 (11-29-21 @ 00:12) (127/63 - 195/105)  RR: 19 (11-29-21 @ 00:12) (17 - 20)  SpO2: 97% (11-29-21 @ 00:12) (95% - 97%)  Wt(kg): --    PHYSICAL EXAM:  GENERAL: NAD, well-groomed  HEAD:  Atraumatic, Normocephalic  EYES: EOMI, PERRLA, conjunctiva and sclera clear  ENMT: No oropharyngeal exudates, Moist mucous membranes, good dentition  NECK: Supple, no cervical lymphadenopathy  NERVOUS SYSTEM:  Alert & Oriented x name and birthday not date or location, patient is hard of hearing, she follows commands appropriately, CN II-XII intact, 5/5 BUE and BLE motor strength, full sensation to light touch   CHEST/LUNG: Clear to auscultation bilaterally; No rales, no rhonchi, no wheezing  HEART: Regular rate and rhythm; No murmurs, rubs, or gallops  ABDOMEN: Soft, Nontender, Nondistended  EXTREMITIES:  2+ radial Pulses, No cyanosis or edema  SKIN: warm, dry

## 2021-11-29 NOTE — PHYSICAL THERAPY INITIAL EVALUATION ADULT - GENERAL OBSERVATIONS, REHAB EVAL
Pt a/w confusion, CTA H&N showing basilar stenosis, small focal hypodensity within L occipital lobe concerning for new infarct, right A1/A2 junction aneurysm- per neurosurgery consult no intervention . Pt received semi-supine on stretcher in ED in NAD, VSS, A&Ox2 (self, , place) appears confused, questioning why, following 50%^ simple commands with cueing, encouragement given to participate with PT.

## 2021-11-29 NOTE — CONSULT NOTE ADULT - ASSESSMENT
88yo lady with PMH of COPD, htn, hLd, hypothyroidism, TIA, trigeminal neuralgia, T2DM with AMS    - No signs of significant ischemia or volume overload.   - ekg wihtout ischemic changes.   - HS trops neg    - Her CTA of the head and neck shows severe obstruction of the PCA  - neurosx evaluation appreciated  - she may need permissive HTN to maintain an adequate MAP in setting of cerebral arterial stenosis  - consider maintaining -150  - fu with neuro  - cont norvasc and losartan  - cont plavix and statin    - she has a loud audible systolic  murmur on exam  - check 2d echo    - cont with hypothyroid rx.   - Further cardiac workup will depend on clinical course.   - All other workup per primary team. Will followup.

## 2021-11-29 NOTE — PHYSICAL THERAPY INITIAL EVALUATION ADULT - PRECAUTIONS/LIMITATIONS, REHAB EVAL
[CONTINUED FROM ABOVE]: CT identified severe short segment stenosis versus subtotal occlusion of the left posterior cerebral artery at the P1/P2 junction. 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 CT perfusion study found predicted volume of 99 mL penumbra of tissue at risk, predominantly located within the left PCA territory. CT head: Small focal hypodensity within the left occipital lobe, concerning for recent infarct, new since 7/18/2021. No acute intracranial hemorrhagePer neurosurgery note-dw hospitalist, given advanced age and bl dementia and elevated MRS, no role for nsgy intervention for aneurysm, and given return to b/l neuro exam no acute need for neuroIR intervention for stenosis at this time.Stroke neuro saw, started ASA81 for basilar stenosis, getting MRI; outpt fu w/ stroke neuro/fall precautions

## 2021-11-29 NOTE — PHYSICAL THERAPY INITIAL EVALUATION ADULT - PERTINENT HX OF CURRENT PROBLEM, REHAB EVAL
88yo lady with PMH of COPD, htn, hLd, hypothyroidism, TIA, trigeminal neuralgia, T2DM who was BIBEMS from [facility] to the ED with confusion. The patient denies any complaints, does not know where she is and cannot recall events from earlier today. I called [facility] and spoke with care provider, Marci, who stated that the patient can ambulate and eat independently. The patient was sent to the ED due to new onset of confusion, but Marci cannot provide further detail.

## 2021-11-29 NOTE — CONSULT NOTE ADULT - ASSESSMENT
ALEX VILLALPANDO  89F w/ progressive dementia (MRS3), pw code stroke from nursing home, found staring/mute unknown LKW. CTH neg (diffuse atrophy), CTA no LVO, +proximal mod basilar stenosis and L PCA stenosis/occlusion w/ area at risk seen on CTP in PCA territory, also incidental 1.8mm R A1/A2 aneurysm. Exam: EOV, pupils cannot eval 2/2 cataracts, conversant intermittently +dysarthria, Ox1 (baseline) bue 4+, ble 4- effort andrade, NIHSS 11 on arrival-->6 on reeval.   - Dw hospitalist, given advanced age and bl dementia and elevated MRS, no role for nsgy intervention for aneurysm, and given return to b/l neuro exam no acute need for neuroIR intervention for stenosis at this time.   - Stroke neuro saw, started ASA81 for basilar stenosis, getting MRI; outpt fu w/ stroke neuro

## 2021-11-29 NOTE — H&P ADULT - NSHPLABSRESULTS_GEN_ALL_CORE
Labs, personally reviewed by me.     Hgb is WNL. Coags are WNL.   HS troponin 43, repeat 33. CMP found SCr of 1.06, up from 0.75 in 10/2021.  Lactate is 2.  UA found protein, 5 hyaline casts.     EKG- sinus HR 75, normal axis, QTc 466    LABS:                        11.8   9.95  )-----------( 295      ( 2021 19:44 )             35.3     Hgb Trend: 11.8<--      136  |  101  |  20  ----------------------------<  165<H>  3.8   |  22  |  1.06    Ca    8.5      2021 19:44    TPro  6.3  /  Alb  4.1  /  TBili  0.3  /  DBili  x   /  AST  21  /  ALT  20  /  AlkPhos  81      Creatinine Trend: 1.06<--  PT/INR - ( 2021 19:44 )   PT: 12.1 sec;   INR: 1.01 ratio         PTT - ( 2021 19:44 )  PTT:33.0 sec  Urinalysis Basic - ( 2021 22:25 )    Color: Yellow / Appearance: Clear / S.035 / pH: x  Gluc: x / Ketone: Negative  / Bili: Negative / Urobili: Negative   Blood: x / Protein: 30 mg/dL / Nitrite: Negative   Leuk Esterase: Negative / RBC: 4 /hpf / WBC 3 /HPF   Sq Epi: x / Non Sq Epi: 1 /hpf / Bacteria: Negative      Venous Blood Gas:    @ 19:39       7.36/43/35/24/57.1       VBG lactate: 2.0    < from: CT Angio Neck w/ IV Cont (21 @ 20:51) >    IMPRESSION:    CTA Neck: No significant flow-limiting stenosis or evidence of acute dissection within the cervical carotid or vertebral arteries.    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    CT Perfusion: Perfusion imaging predicts no core infarct. There is a predicted volume of 99 mL penumbra of tissue at risk, predominantly located within the left PCA territory.    These findings were discussed with Dr. Winn at 2021 7:59 PM by Dr. Baron with read back confirmation.    < end of copied text >

## 2021-11-29 NOTE — H&P ADULT - NSHPREVIEWOFSYSTEMS_GEN_ALL_CORE
REVIEW OF SYSTEMS is unreliable due to patient's known dementia    CONSTITUTIONAL: No fever, no chills  EYES: No eye pain, no vision changes  ENMT:  No difficulty hearing, no throat pain  RESPIRATORY: No cough, no sputum production; No shortness of breath  CARDIOVASCULAR: No chest pain, no palpitations, no leg swelling  GASTROINTESTINAL: No abdominal pain, no nausea, no vomiting  GENITOURINARY: No dysuria, no hematuria  NEUROLOGICAL: No headaches, no loss of strength, no numbness  SKIN: No itching, no rashes, no lesions   MUSCULOSKELETAL: No joint pain, no joint swelling; No muscle pain  HEME/LYMPH: No easy bruising, bleeding

## 2021-11-29 NOTE — H&P ADULT - HISTORY OF PRESENT ILLNESS
This patient is an 89yo lady with PMH of COPD, htn, hLd, hypothyroidism, TIA, trigeminal neuralgia, T2DM who presents to the ED with confusion.  This patient is an 88yo lady with PMH of COPD, htn, hLd, hypothyroidism, TIA, trigeminal neuralgia, T2DM who was BIBEMS from [facility] to the ED with confusion. The patient denies any complaints, does not know where she is and cannot recall events from earlier today. I called [facility] and spoke with care provider, aMrci, who stated that the patient can ambulate and eat independently. The patient was sent to the ED due to new onset of confusion.  This patient is an 89yo lady with PMH of COPD, htn, hLd, hypothyroidism, TIA, trigeminal neuralgia, T2DM who was BIBEMS from [facility] to the ED with confusion. The patient denies any complaints, does not know where she is and cannot recall events from earlier today. I called [facility] and spoke with care provider, Marci, who stated that the patient can ambulate and eat independently. The patient was sent to the ED due to new onset of confusion, but Marci cannot provide further detail. She had no  This patient is an 89yo lady with PMH of COPD, htn, hLd, hypothyroidism, TIA, trigeminal neuralgia, T2DM who was BIBEMS from [facility] to the ED with confusion. The patient denies any complaints, does not know where she is and cannot recall events from earlier today. I called [facility] and spoke with care provider, Marci, who stated that the patient can ambulate and eat independently. The patient was sent to the ED due to new onset of confusion, but Marci cannot provide further detail.

## 2021-11-29 NOTE — PHYSICAL THERAPY INITIAL EVALUATION ADULT - ADDITIONAL COMMENTS
PTA according to pt and chart review, pt ambulated independently with rolling walker and performed all functional activities independently. Pt owns a rolling walker, reports she sits in a shower chair when showering.

## 2021-11-29 NOTE — H&P ADULT - PROBLEM SELECTOR PLAN 3
Patient is symptomatic with SBP in 190s-200s/100s  Pt was given losartan 25mg PO x1.   Will continue losartan 25mg PO BID (equivalent to home candesartan dose). Start home dose of carbamazepine 200mg TID.

## 2021-11-29 NOTE — H&P ADULT - PROBLEM SELECTOR PLAN 1
CT identified severe short segment stenosis versus subtotal occlusion of the left posterior cerebral artery at the P1/P2 junction. 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  CT perfusion study found predicted volume of 99 mL penumbra of tissue at risk, predominantly located within the left PCA territory.  I spoke with neurosurgery team- 1.8mm aneurysm does not require intervention.  Follow up neurology team recommendations:   - check MRI brain w/o contrast  - start aspirin 81mg PO qdaily  - check TSH, free T4, B12, folate, thiamine levels   - start fall precautions  - dysphagia screen CT identified severe short segment stenosis versus subtotal occlusion of the left posterior cerebral artery at the P1/P2 junction. 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  CT perfusion study found predicted volume of 99 mL penumbra of tissue at risk, predominantly located within the left PCA territory.  I spoke with neurosurgery team- 1.8mm aneurysm does not require intervention.  Follow up neurology team recommendations:   - check MRI brain w/o contrast  - Patient has documented aspirin allergy. Will tart plavix 75mg PO qdaily.  - check TSH, free T4, B12, folate, thiamine levels   - start fall precautions  - dysphagia screen  Will increase atorvastatin to 40 mg PO qdaily.

## 2021-11-29 NOTE — CONSULT NOTE ADULT - SUBJECTIVE AND OBJECTIVE BOX
CHIEF COMPLAINT: Patient is a 89y old  Female who presents with a chief complaint of confusion (29 Nov 2021 02:46)      HPI: Pt Unable to provide meaningful information. taken from chart review  This patient is an 89yo lady with PMH of COPD, htn, hLd, hypothyroidism, TIA, trigeminal neuralgia, T2DM who was BIBEMS from [facility] to the ED with confusion. The patient denies any complaints, does not know where she is and cannot recall events from earlier today. I called [facility] and spoke with care provider, Marci, who stated that the patient can ambulate and eat independently. The patient was sent to the ED due to new onset of confusion, but Marci cannot provide further detail.  (29 Nov 2021 00:47)      EKG: SR    REVIEW OF SYSTEMS:   Limited 2/2 comorbidities     PAST MEDICAL & SURGICAL HISTORY:  Hypertension    Transient ischemic attack (TIA)    Chronic obstructive pulmonary disease (COPD)    Dementia    T2DM (type 2 diabetes mellitus)    HLD (hyperlipidemia)    Hypothyroidism    Trigeminal neuralgia    No significant past surgical history        SOCIAL HISTORY:  No tobacco, ethanol, or drug abuse.    FAMILY HISTORY:    No family history of acute MI or sudden cardiac death.    MEDICATIONS  (STANDING):  amLODIPine   Tablet 10 milliGRAM(s) Oral daily  atorvastatin 40 milliGRAM(s) Oral at bedtime  carBAMazepine 200 milliGRAM(s) Oral three times a day  cholecalciferol 2000 Unit(s) Oral daily  clopidogrel Tablet 75 milliGRAM(s) Oral daily  enoxaparin Injectable 40 milliGRAM(s) SubCutaneous daily  ferrous    sulfate 325 milliGRAM(s) Oral daily  levothyroxine 125 MICROGram(s) Oral daily  losartan 25 milliGRAM(s) Oral two times a day  multivitamin 1 Tablet(s) Oral daily    MEDICATIONS  (PRN):  acetaminophen     Tablet .. 650 milliGRAM(s) Oral every 6 hours PRN Temp greater or equal to 38C (100.4F), Mild Pain (1 - 3)      Allergies    Ambien (Unknown)  aspirin (Unknown)  sulfa drugs (Unknown)    Intolerances        Home meds:  Home Medications:  alendronate 70 mg oral tablet: 1 tab(s) orally once a week on Wednesdays (29 Nov 2021 01:29)  atorvastatin 20 mg oral tablet: 1 tab(s) orally once a day (29 Nov 2021 01:29)  candesartan 8 mg oral tablet: 1 tab(s) orally 2 times a day (29 Nov 2021 01:29)  carBAMazepine 200 mg oral tablet: 1 tab(s) orally 3 times a day (at 6AM, 2pm and 10pm) (29 Nov 2021 01:29)  Eucerin topical lotion: Apply topically to affected area 2 times a day (29 Nov 2021 01:29)  ferrous sulfate 325 mg (65 mg elemental iron) oral tablet: 1 tab(s) orally once a day (29 Nov 2021 01:29)  levothyroxine 125 mcg (0.125 mg) oral tablet: 1 tab(s) orally once a day (29 Nov 2021 01:29)  Multiple Vitamins oral tablet: 1 tab(s) orally once a day (29 Nov 2021 01:29)  Vitamin D3 25 mcg (1000 intl units) oral capsule: 2 cap(s) orally once a day (29 Nov 2021 01:29)        VITAL SIGNS:   Vital Signs Last 24 Hrs  T(C): 36.9 (29 Nov 2021 08:25), Max: 37 (29 Nov 2021 05:06)  T(F): 98.5 (29 Nov 2021 08:25), Max: 98.6 (29 Nov 2021 05:06)  HR: 84 (29 Nov 2021 10:10) (69 - 86)  BP: 138/82 (29 Nov 2021 10:10) (127/63 - 216/110)  BP(mean): --  RR: 18 (29 Nov 2021 08:25) (17 - 20)  SpO2: 94% (29 Nov 2021 10:10) (93% - 97%)    I&O's Summary      On Exam:  Constitutional: NAD, awake and alert, well-developed  HEENT: Moist Mucous Membranes, Anicteric  Pulmonary: Decreased breath sounds b/l. No rales, crackles or wheeze appreciated.   Cardiovascular: Regular, S1 and S2, 3/6 SM  Gastrointestinal: Bowel Sounds present, soft, nontender.   Lymph: No peripheral edema. No lymphadenopathy.  Skin: No visible rashes or ulcers.  Psych:  Mood & affect appropriate, confused    LABS: All Labs Reviewed:                        11.8   9.95  )-----------( 295      ( 28 Nov 2021 19:44 )             35.3     28 Nov 2021 19:44    136    |  101    |  20     ----------------------------<  165    3.8     |  22     |  1.06     Ca    8.5        28 Nov 2021 19:44    TPro  6.3    /  Alb  4.1    /  TBili  0.3    /  DBili  x      /  AST  21     /  ALT  20     /  AlkPhos  81     28 Nov 2021 19:44    PT/INR - ( 28 Nov 2021 19:44 )   PT: 12.1 sec;   INR: 1.01 ratio         PTT - ( 28 Nov 2021 19:44 )  PTT:33.0 sec      Blood Culture:     11-29 @ 08:54  TSH: 12.60  11-29 @ 00:25  TSH: 24.00      RADIOLOGY:  < from: CT Angio Neck w/ IV Cont (11.28.21 @ 20:51) >    EXAM:  CT ANGIO BRAIN (W)AW IC                          EXAM:  CT PERFUSION W MAPS IC                          EXAM:  CT ANGIO NECK (W)AW IC                            PROCEDURE DATE:  11/28/2021            INTERPRETATION:  CLINICAL HISTORY: Code stroke.    TECHNIQUE:  During IV contrast administration, serial thin section CT images of the brain were obtained for CT perfusion. The raw data was sent to RAPID Ischemia View for post processing.  Axial CT images were then acquired through the  neckand head  during the arterial phase.  Intravenous contrast total:  120 cc of Omnipaque-300 mg/ml were administered; 80 cc were discarded.  Three-dimensional MIP reformats were generated.    COMPARISON STUDY: None.    FINDINGS:    CT PERFUSION:    Perfusion parameters are as follows:    CBF <30%: 0 mL  Tmax >6 seconds: 99 mL  Mismatch volume: 99 mL  Mismatch ratio: Infinite    CT ANGIOGRAPHY NECK:    Thoracic aorta and branch vessels: Patent. Atherosclerotic calcifications.  No flow-limiting stenosis.  No evidence of dissection. Left vertebral artery originates directly off of the aortic arch, normal variant.    Right carotid system: Patent. Atherosclerotic calcification along the proximal internal carotid artery.  No hemodynamically significant stenosis using NASCET criteria.  No evidence of dissection.    Left carotid system: Patent.  No atherosclerosis.  No hemodynamically significant stenosis using NASCET criteria.  No evidence of dissection.    Vertebral arteries: Patent.  No atherosclerosis.  No flow-limiting stenosis.  No evidence of dissection.    Soft tissues of the neck: Unremarkable.    Visualized spine: Multilevel degenerative change.    Visualized upper chest: Biapical pleural parenchymal scarring.    CT ANGIOGRAPHY BRAIN:    Internal carotid arteries: Patent bilaterally. No flow limiting stenosis.    Anterior cerebral arteries: Patent bilaterally without flow limiting stenosis.    Middle cerebral arteries: Patent. Mild stenosis of a proximal right M2 and mild to moderate short segment stenosis of a proximal left M2 branch. Preserved distal flow.    Anterior communicating artery: Poorly visualized.    Posterior communicating arteries: Visualized bilaterally.    Posterior cerebral arteries: Markedly severe stenosis versus subtotal occlusion of the left posterior cerebral artery at the P1/P2 junction with preserved distal flow, which may be in part secondary to a posterior communicating artery. Patent right posterior cerebral artery. Right fetal origin.    Vertebrobasilar: Moderate stenosis of the proximal basilar artery. The distal vertebral arteries are similar in caliber.    Vascular lesions: 1.8 mm anterior medially oriented outpouching off of the right A1/A2 junction with a neck measuring approximately 1.7 mm, most consistent with an aneurysm.    Dural venous sinuses: Grossly patent.    IMPRESSION:    CTA Neck: No significant flow-limiting stenosis or evidence of acute dissection within the cervical carotid or vertebral arteries.    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    CT Perfusion: Perfusion imaging predicts no core infarct. There is a predicted volume of 99 mL penumbra of tissue at risk, predominantly located within the left PCA territory.    These findings were discussed with Dr. Winn at 11/28/2021 7:59 PM by Dr. Baron with read back confirmation.    --- End of Report ---              LYNDA BARON MD; Resident Radiology  This document has been electronically signed.  VAL HAM MD; Attending Radiologist  This document has been electronically signed. Nov 28 2021  8:35PM    < end of copied text >

## 2021-11-29 NOTE — H&P ADULT - ASSESSMENT
This patient is an 88yo lady with PMH of COPD, htn, hLd, hypothyroidism, TIA, trigeminal neuralgia, T2DM who was BIBEMS from [facility] to the ED with confusion. The patient denies any complaints, does not know where she is and cannot recall events from earlier today. I called [facility] and spoke with care provider, Marci, who stated that the patient can ambulate and eat independently. The patient was sent to the ED due to new onset of confusion.

## 2021-11-29 NOTE — PHYSICAL THERAPY INITIAL EVALUATION ADULT - DISCHARGE DISPOSITION, PT EVAL
rehabilitation facility Subacute rehab to improve functional mobility, if d/c back to [facility] pt requires assistance/supervision with all functional activities, home PT/rehabilitation facility

## 2021-11-29 NOTE — PHYSICAL THERAPY INITIAL EVALUATION ADULT - IMPAIRMENTS FOUND, PT EVAL
aerobic capacity/endurance/arousal, attention, and cognition/gait, locomotion, and balance/muscle strength/posture/ROM

## 2021-11-29 NOTE — CONSULT NOTE ADULT - SUBJECTIVE AND OBJECTIVE BOX
p ([id number])     HPI:  89F w/ progressive dementia (MRS3), pw code stroke from nursing home, found staring/mute unknown LKW. CTH neg (diffuse atrophy), CTA no LVO, +proximal mod basilar stenosis and L PCA stenosis/occlusion w/ area at risk seen on CTP in PCA territory, also incidental 1.8mm R A1/A2 aneurysm. Exam: EOV, pupils cannot eval 2/2 cataracts, conversant intermittently +dysarthria, Ox1 (baseline) bue 4+, ble 4- effort andrade, NIHSS 11 on arrival-->6 on reeval.     --Anticoagulation:    =====================  PAST MEDICAL HISTORY   Hypertension    Transient ischemic attack (TIA)    Chronic obstructive pulmonary disease (COPD)    Dementia    T2DM (type 2 diabetes mellitus)    HLD (hyperlipidemia)    Hypothyroidism    Trigeminal neuralgia      PAST SURGICAL HISTORY   No significant past surgical history      Ambien (Unknown)  aspirin (Unknown)  sulfa drugs (Unknown)      MEDICATIONS:  Antibiotics:    Neuro:  acetaminophen     Tablet .. 650 milliGRAM(s) Oral every 6 hours PRN  carBAMazepine 200 milliGRAM(s) Oral three times a day    Other:  atorvastatin 40 milliGRAM(s) Oral at bedtime  cholecalciferol 2000 Unit(s) Oral daily  ferrous    sulfate 325 milliGRAM(s) Oral daily  levothyroxine 125 MICROGram(s) Oral daily  losartan 25 milliGRAM(s) Oral two times a day  multivitamin 1 Tablet(s) Oral daily      SOCIAL HISTORY:   Occupation:   Marital Status:     FAMILY HISTORY:  No pertinent family history in first degree relatives        ROS: Negative except per HPI    LABS:  PT/INR - ( 28 Nov 2021 19:44 )   PT: 12.1 sec;   INR: 1.01 ratio         PTT - ( 28 Nov 2021 19:44 )  PTT:33.0 sec                        11.8   9.95  )-----------( 295      ( 28 Nov 2021 19:44 )             35.3     11-28    136  |  101  |  20  ----------------------------<  165<H>  3.8   |  22  |  1.06    Ca    8.5      28 Nov 2021 19:44    TPro  6.3  /  Alb  4.1  /  TBili  0.3  /  DBili  x   /  AST  21  /  ALT  20  /  AlkPhos  81  11-28

## 2021-11-30 LAB
ANION GAP SERPL CALC-SCNC: 19 MMOL/L — HIGH (ref 5–17)
BUN SERPL-MCNC: 18 MG/DL — SIGNIFICANT CHANGE UP (ref 7–23)
CALCIUM SERPL-MCNC: 9.1 MG/DL — SIGNIFICANT CHANGE UP (ref 8.4–10.5)
CHLORIDE SERPL-SCNC: 98 MMOL/L — SIGNIFICANT CHANGE UP (ref 96–108)
CO2 SERPL-SCNC: 16 MMOL/L — LOW (ref 22–31)
COVID-19 NUCLEOCAPSID GAM AB INTERP: POSITIVE
COVID-19 NUCLEOCAPSID TOTAL GAM ANTIBODY RESULT: 86.9 INDEX — HIGH
COVID-19 SPIKE DOMAIN AB INTERP: POSITIVE
COVID-19 SPIKE DOMAIN ANTIBODY RESULT: >250 U/ML — HIGH
CREAT SERPL-MCNC: 0.7 MG/DL — SIGNIFICANT CHANGE UP (ref 0.5–1.3)
GLUCOSE SERPL-MCNC: 88 MG/DL — SIGNIFICANT CHANGE UP (ref 70–99)
HCT VFR BLD CALC: 34.4 % — LOW (ref 34.5–45)
HGB BLD-MCNC: 11.7 G/DL — SIGNIFICANT CHANGE UP (ref 11.5–15.5)
MCHC RBC-ENTMCNC: 31.3 PG — SIGNIFICANT CHANGE UP (ref 27–34)
MCHC RBC-ENTMCNC: 34 GM/DL — SIGNIFICANT CHANGE UP (ref 32–36)
MCV RBC AUTO: 92 FL — SIGNIFICANT CHANGE UP (ref 80–100)
NRBC # BLD: 0 /100 WBCS — SIGNIFICANT CHANGE UP (ref 0–0)
PLATELET # BLD AUTO: 262 K/UL — SIGNIFICANT CHANGE UP (ref 150–400)
POTASSIUM SERPL-MCNC: 3.7 MMOL/L — SIGNIFICANT CHANGE UP (ref 3.5–5.3)
POTASSIUM SERPL-SCNC: 3.7 MMOL/L — SIGNIFICANT CHANGE UP (ref 3.5–5.3)
RBC # BLD: 3.74 M/UL — LOW (ref 3.8–5.2)
RBC # FLD: 13.3 % — SIGNIFICANT CHANGE UP (ref 10.3–14.5)
SARS-COV-2 IGG+IGM SERPL QL IA: 86.9 INDEX — HIGH
SARS-COV-2 IGG+IGM SERPL QL IA: >250 U/ML — HIGH
SARS-COV-2 IGG+IGM SERPL QL IA: POSITIVE
SARS-COV-2 IGG+IGM SERPL QL IA: POSITIVE
SODIUM SERPL-SCNC: 133 MMOL/L — LOW (ref 135–145)
WBC # BLD: 9.48 K/UL — SIGNIFICANT CHANGE UP (ref 3.8–10.5)
WBC # FLD AUTO: 9.48 K/UL — SIGNIFICANT CHANGE UP (ref 3.8–10.5)

## 2021-11-30 PROCEDURE — 99232 SBSQ HOSP IP/OBS MODERATE 35: CPT

## 2021-11-30 RX ORDER — HYDRALAZINE HCL 50 MG
10 TABLET ORAL ONCE
Refills: 0 | Status: COMPLETED | OUTPATIENT
Start: 2021-11-30 | End: 2021-11-30

## 2021-11-30 RX ORDER — LOSARTAN POTASSIUM 100 MG/1
25 TABLET, FILM COATED ORAL ONCE
Refills: 0 | Status: COMPLETED | OUTPATIENT
Start: 2021-11-30 | End: 2021-11-30

## 2021-11-30 RX ORDER — LOSARTAN POTASSIUM 100 MG/1
50 TABLET, FILM COATED ORAL
Refills: 0 | Status: DISCONTINUED | OUTPATIENT
Start: 2021-11-30 | End: 2021-12-02

## 2021-11-30 RX ADMIN — Medication 125 MICROGRAM(S): at 06:34

## 2021-11-30 RX ADMIN — LOSARTAN POTASSIUM 25 MILLIGRAM(S): 100 TABLET, FILM COATED ORAL at 06:35

## 2021-11-30 RX ADMIN — LOSARTAN POTASSIUM 50 MILLIGRAM(S): 100 TABLET, FILM COATED ORAL at 17:21

## 2021-11-30 RX ADMIN — Medication 200 MILLIGRAM(S): at 21:21

## 2021-11-30 RX ADMIN — Medication 325 MILLIGRAM(S): at 11:51

## 2021-11-30 RX ADMIN — Medication 200 MILLIGRAM(S): at 15:30

## 2021-11-30 RX ADMIN — AMLODIPINE BESYLATE 10 MILLIGRAM(S): 2.5 TABLET ORAL at 09:36

## 2021-11-30 RX ADMIN — ATORVASTATIN CALCIUM 40 MILLIGRAM(S): 80 TABLET, FILM COATED ORAL at 21:21

## 2021-11-30 RX ADMIN — Medication 2000 UNIT(S): at 11:51

## 2021-11-30 RX ADMIN — Medication 200 MILLIGRAM(S): at 06:34

## 2021-11-30 RX ADMIN — LOSARTAN POTASSIUM 25 MILLIGRAM(S): 100 TABLET, FILM COATED ORAL at 11:50

## 2021-11-30 RX ADMIN — CLOPIDOGREL BISULFATE 75 MILLIGRAM(S): 75 TABLET, FILM COATED ORAL at 11:51

## 2021-11-30 RX ADMIN — ENOXAPARIN SODIUM 40 MILLIGRAM(S): 100 INJECTION SUBCUTANEOUS at 11:51

## 2021-11-30 RX ADMIN — Medication 1 TABLET(S): at 11:51

## 2021-11-30 RX ADMIN — Medication 10 MILLIGRAM(S): at 19:53

## 2021-12-01 ENCOUNTER — TRANSCRIPTION ENCOUNTER (OUTPATIENT)
Age: 86
End: 2021-12-01

## 2021-12-01 LAB
ANION GAP SERPL CALC-SCNC: 14 MMOL/L — SIGNIFICANT CHANGE UP (ref 5–17)
BUN SERPL-MCNC: 17 MG/DL — SIGNIFICANT CHANGE UP (ref 7–23)
CALCIUM SERPL-MCNC: 8.7 MG/DL — SIGNIFICANT CHANGE UP (ref 8.4–10.5)
CARBAMAZEPINE, FREE: 2 UG/ML — SIGNIFICANT CHANGE UP (ref 0.6–4.2)
CHLORIDE SERPL-SCNC: 99 MMOL/L — SIGNIFICANT CHANGE UP (ref 96–108)
CO2 SERPL-SCNC: 22 MMOL/L — SIGNIFICANT CHANGE UP (ref 22–31)
CREAT SERPL-MCNC: 0.66 MG/DL — SIGNIFICANT CHANGE UP (ref 0.5–1.3)
GLUCOSE BLDC GLUCOMTR-MCNC: 91 MG/DL — SIGNIFICANT CHANGE UP (ref 70–99)
GLUCOSE SERPL-MCNC: 87 MG/DL — SIGNIFICANT CHANGE UP (ref 70–99)
MAGNESIUM SERPL-MCNC: 1.9 MG/DL — SIGNIFICANT CHANGE UP (ref 1.6–2.6)
PHOSPHATE SERPL-MCNC: 3.6 MG/DL — SIGNIFICANT CHANGE UP (ref 2.5–4.5)
POTASSIUM SERPL-MCNC: 3.5 MMOL/L — SIGNIFICANT CHANGE UP (ref 3.5–5.3)
POTASSIUM SERPL-SCNC: 3.5 MMOL/L — SIGNIFICANT CHANGE UP (ref 3.5–5.3)
SODIUM SERPL-SCNC: 135 MMOL/L — SIGNIFICANT CHANGE UP (ref 135–145)

## 2021-12-01 PROCEDURE — 93306 TTE W/DOPPLER COMPLETE: CPT | Mod: 26

## 2021-12-01 PROCEDURE — 99233 SBSQ HOSP IP/OBS HIGH 50: CPT

## 2021-12-01 PROCEDURE — 74018 RADEX ABDOMEN 1 VIEW: CPT | Mod: 26

## 2021-12-01 RX ORDER — POLYETHYLENE GLYCOL 3350 17 G/17G
17 POWDER, FOR SOLUTION ORAL DAILY
Refills: 0 | Status: DISCONTINUED | OUTPATIENT
Start: 2021-12-01 | End: 2021-12-02

## 2021-12-01 RX ORDER — SENNA PLUS 8.6 MG/1
2 TABLET ORAL AT BEDTIME
Refills: 0 | Status: DISCONTINUED | OUTPATIENT
Start: 2021-12-01 | End: 2021-12-02

## 2021-12-01 RX ORDER — ASPIRIN/CALCIUM CARB/MAGNESIUM 324 MG
81 TABLET ORAL DAILY
Refills: 0 | Status: DISCONTINUED | OUTPATIENT
Start: 2021-12-01 | End: 2021-12-02

## 2021-12-01 RX ORDER — POTASSIUM CHLORIDE 20 MEQ
20 PACKET (EA) ORAL
Refills: 0 | Status: DISCONTINUED | OUTPATIENT
Start: 2021-12-01 | End: 2021-12-02

## 2021-12-01 RX ORDER — HYDRALAZINE HCL 50 MG
10 TABLET ORAL ONCE
Refills: 0 | Status: COMPLETED | OUTPATIENT
Start: 2021-12-01 | End: 2021-12-01

## 2021-12-01 RX ORDER — HYDRALAZINE HCL 50 MG
5 TABLET ORAL ONCE
Refills: 0 | Status: COMPLETED | OUTPATIENT
Start: 2021-12-01 | End: 2021-12-01

## 2021-12-01 RX ADMIN — Medication 200 MILLIGRAM(S): at 13:47

## 2021-12-01 RX ADMIN — LOSARTAN POTASSIUM 50 MILLIGRAM(S): 100 TABLET, FILM COATED ORAL at 06:28

## 2021-12-01 RX ADMIN — LOSARTAN POTASSIUM 50 MILLIGRAM(S): 100 TABLET, FILM COATED ORAL at 17:46

## 2021-12-01 RX ADMIN — Medication 2000 UNIT(S): at 12:03

## 2021-12-01 RX ADMIN — ATORVASTATIN CALCIUM 40 MILLIGRAM(S): 80 TABLET, FILM COATED ORAL at 22:11

## 2021-12-01 RX ADMIN — AMLODIPINE BESYLATE 10 MILLIGRAM(S): 2.5 TABLET ORAL at 09:32

## 2021-12-01 RX ADMIN — SENNA PLUS 2 TABLET(S): 8.6 TABLET ORAL at 22:11

## 2021-12-01 RX ADMIN — Medication 10 MILLIGRAM(S): at 05:54

## 2021-12-01 RX ADMIN — Medication 325 MILLIGRAM(S): at 12:04

## 2021-12-01 RX ADMIN — Medication 200 MILLIGRAM(S): at 22:11

## 2021-12-01 RX ADMIN — Medication 20 MILLIEQUIVALENT(S): at 12:03

## 2021-12-01 RX ADMIN — ENOXAPARIN SODIUM 40 MILLIGRAM(S): 100 INJECTION SUBCUTANEOUS at 12:03

## 2021-12-01 RX ADMIN — Medication 200 MILLIGRAM(S): at 06:18

## 2021-12-01 RX ADMIN — Medication 125 MICROGRAM(S): at 06:19

## 2021-12-01 RX ADMIN — Medication 1 TABLET(S): at 12:04

## 2021-12-01 RX ADMIN — POLYETHYLENE GLYCOL 3350 17 GRAM(S): 17 POWDER, FOR SOLUTION ORAL at 15:42

## 2021-12-01 RX ADMIN — Medication 5 MILLIGRAM(S): at 22:11

## 2021-12-01 RX ADMIN — CLOPIDOGREL BISULFATE 75 MILLIGRAM(S): 75 TABLET, FILM COATED ORAL at 12:03

## 2021-12-01 RX ADMIN — Medication 81 MILLIGRAM(S): at 17:45

## 2021-12-01 NOTE — DISCHARGE NOTE PROVIDER - HOSPITAL COURSE
This patient is an 88yo lady with PMH of COPD, htn, hLd, hypothyroidism, TIA, trigeminal neuralgia, T2DM who was BIBEMS from [facility] to the ED with confusion. The patient denied any complaints, did not  know where she was and could not recall events from earlier that day.  [facility] (assisted living facility) was contacted, Attending MD spoke with care provider, Marci, who stated that the patient can ambulate and eat independently. The patient was sent to the ED due to new onset of confusion.   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 CT identified severe short segment stenosis versus subtotal occlusion of the left posterior cerebral artery at the P1/P2 junction. 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  CT perfusion study found predicted volume of 99 mL penumbra of tissue at risk, predominantly located within the left PCA territory.  I spoke with neurosurgery team- 1.8mm aneurysm does not require intervention.  Follow up neurology team recommendations:   - check MRI brain w/o contrast  - Patient has documented aspirin allergy. Will start plavix 75mg PO qdaily.  - check TSH, free T4, B12, folate, thiamine levels   - start fall precautions  - dysphagia screen  -  increase atorvastatin to 40 mg PO qdaily.  ·  Hypertensive urgency -Patient is symptomatic with SBP in 190s/100s  - Medications updated  ·  Trigeminal neuralgia - resume home dose of carbamazepine 200mg TID.    Cleared by Neurology and Cardiology - discharged back to Assisted Living

## 2021-12-01 NOTE — DISCHARGE NOTE PROVIDER - PROVIDER TOKENS
PROVIDER:[TOKEN:[[id number]]],PROVIDER:[TOKEN:[[id number]]],PROVIDER:[TOKEN:[[id number]]]

## 2021-12-01 NOTE — PROVIDER CONTACT NOTE (OTHER) - BACKGROUND
88yo F in for AMS from [facility].
admit with ams, hypertensive urgency
admit for AMS, s/p code stroke
Pt admitte dfor AMS from [facility]. PMH T2 DM, COPD, HLD.
89yo lady with PMH of COPD, HTN, HLD, Hypothyroidism, TIA, trigeminal neuralgia, T2DM who was BIBEMS from [facility] to the ED with confusion

## 2021-12-01 NOTE — DISCHARGE NOTE PROVIDER - NSDCFUADDINST_GEN_ALL_CORE_FT
Discharge back to assisted living facility.  Make appointment(s) to follow up with your Healthcare Providers - bring all discharge paperwork including discharge medication list to follow up appointment.

## 2021-12-01 NOTE — DISCHARGE NOTE PROVIDER - CARE PROVIDERS DIRECT ADDRESSES
[email or web address],DirectAddress_Unknown,DirectAddress_Unknown

## 2021-12-01 NOTE — DISCHARGE NOTE PROVIDER - NSDCCPCAREPLAN_GEN_ALL_CORE_FT
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, left  Assessment and Plan of Treatment:       SECONDARY DISCHARGE DIAGNOSES  Diagnosis: Trigeminal neuralgia  Assessment and Plan of Treatment:     Diagnosis: Hypothyroidism  Assessment and Plan of Treatment:     Diagnosis: Hypertensive urgency  Assessment and Plan of Treatment:     Diagnosis: Altered mental state  Assessment and Plan of Treatment:      PRINCIPAL DISCHARGE DIAGNOSIS  Diagnosis: Occlusion of left posterior cerebral artery  Assessment and Plan of Treatment:       SECONDARY DISCHARGE DIAGNOSES  Diagnosis: Trigeminal neuralgia  Assessment and Plan of Treatment:     Diagnosis: Hypothyroidism  Assessment and Plan of Treatment:     Diagnosis: Hypertensive urgency  Assessment and Plan of Treatment:     Diagnosis: Altered mental state  Assessment and Plan of Treatment:

## 2021-12-01 NOTE — DISCHARGE NOTE PROVIDER - CARE PROVIDER_API CALL
Lela Luu)  Internal Medicine  [address], [address]  [place] [address]  Phone: [phone]  Fax: [phone]  Follow Up Time:     Sean Webb)  Cardiovascular Disease; Internal Medicine  [address], [place] [id number]  Phone: [phone]  Fax: [phone]  Follow Up Time:     Gregory Bonilla)  Neurology; Vascular Neurology  [address], [address]  [place] [address]  Phone: [phone]  Fax: [phone]  Follow Up Time:

## 2021-12-01 NOTE — PROVIDER CONTACT NOTE (OTHER) - RECOMMENDATIONS
MD made aware. Give losartan 25mg PO now.
BP medications
NP is aware. Losartan 25 given for now. Continue to monitor the patient.

## 2021-12-01 NOTE — PROVIDER CONTACT NOTE (OTHER) - ASSESSMENT
190/100 hr 70sNSR, alert to self, place; no headache or chest pain
BP - 188/114  HR - 87  SPO2- 95  temp 97.7  Pt asymptomatic. denies of headache, dizziness, or light headedness.
bp 186/90 hr 70 SR, no c/o pain no headache
VS as documented. No other s/s of distress.
Pt A&Ox1. Pt show no s/s of cp. Pt BP has been elevated since admission.

## 2021-12-01 NOTE — DISCHARGE NOTE PROVIDER - NSDCMRMEDTOKEN_GEN_ALL_CORE_FT
alendronate 70 mg oral tablet: 1 tab(s) orally once a week on Wednesdays  atorvastatin 20 mg oral tablet: 1 tab(s) orally once a day  candesartan 8 mg oral tablet: 1 tab(s) orally 2 times a day  carBAMazepine 200 mg oral tablet: 1 tab(s) orally 3 times a day (at 6AM, 2pm and 10pm)  Eucerin topical lotion: Apply topically to affected area 2 times a day  ferrous sulfate 325 mg (65 mg elemental iron) oral tablet: 1 tab(s) orally once a day  levothyroxine 125 mcg (0.125 mg) oral tablet: 1 tab(s) orally once a day  Multiple Vitamins oral tablet: 1 tab(s) orally once a day  Vitamin D3 25 mcg (1000 intl units) oral capsule: 2 cap(s) orally once a day   alendronate 70 mg oral tablet: 1 tab(s) orally once a week on Wednesdays  amLODIPine 10 mg oral tablet: 1 tab(s) orally once a day  aspirin 81 mg oral tablet, chewable: 1 tab(s) orally once a day  atorvastatin 40 mg oral tablet: 1 tab(s) orally once a day (at bedtime)  candesartan 8 mg oral tablet: 1 tab(s) orally 2 times a day  carBAMazepine 200 mg oral tablet: 1 tab(s) orally 3 times a day (at 6AM, 2pm and 10pm)  clopidogrel 75 mg oral tablet: 1 tab(s) orally once a day  Eucerin topical lotion: Apply topically to affected area 2 times a day  ferrous sulfate 325 mg (65 mg elemental iron) oral tablet: 1 tab(s) orally once a day  hydrALAZINE 25 mg oral tablet: 1 tab(s) orally every 8 hours   levothyroxine 125 mcg (0.125 mg) oral tablet: 1 tab(s) orally once a day  Multiple Vitamins oral tablet: 1 tab(s) orally once a day  out-patient physical therapy :   Vitamin D3 25 mcg (1000 intl units) oral capsule: 2 cap(s) orally once a day

## 2021-12-01 NOTE — PROVIDER CONTACT NOTE (OTHER) - SITUATION
pt bp 186/90 manually
Pt blood pressure was high.
/110
Patient is hypertensive, 192/101. No other s/s of distress
pt will hypertension 190/100

## 2021-12-02 ENCOUNTER — TRANSCRIPTION ENCOUNTER (OUTPATIENT)
Age: 86
End: 2021-12-02

## 2021-12-02 LAB
A1C WITH ESTIMATED AVERAGE GLUCOSE RESULT: 5.6 % — SIGNIFICANT CHANGE UP (ref 4–5.6)
ANION GAP SERPL CALC-SCNC: 16 MMOL/L — SIGNIFICANT CHANGE UP (ref 5–17)
BUN SERPL-MCNC: 23 MG/DL — SIGNIFICANT CHANGE UP (ref 7–23)
CALCIUM SERPL-MCNC: 8.9 MG/DL — SIGNIFICANT CHANGE UP (ref 8.4–10.5)
CHLORIDE SERPL-SCNC: 100 MMOL/L — SIGNIFICANT CHANGE UP (ref 96–108)
CHOLEST SERPL-MCNC: 200 MG/DL — HIGH
CO2 SERPL-SCNC: 17 MMOL/L — LOW (ref 22–31)
CREAT SERPL-MCNC: 0.65 MG/DL — SIGNIFICANT CHANGE UP (ref 0.5–1.3)
ESTIMATED AVERAGE GLUCOSE: 114 MG/DL — SIGNIFICANT CHANGE UP (ref 68–114)
GLUCOSE BLDC GLUCOMTR-MCNC: 110 MG/DL — HIGH (ref 70–99)
GLUCOSE BLDC GLUCOMTR-MCNC: 96 MG/DL — SIGNIFICANT CHANGE UP (ref 70–99)
GLUCOSE SERPL-MCNC: 110 MG/DL — HIGH (ref 70–99)
HCT VFR BLD CALC: 36.6 % — SIGNIFICANT CHANGE UP (ref 34.5–45)
HDLC SERPL-MCNC: 67 MG/DL — SIGNIFICANT CHANGE UP
HGB BLD-MCNC: 12.5 G/DL — SIGNIFICANT CHANGE UP (ref 11.5–15.5)
LIPID PNL WITH DIRECT LDL SERPL: 106 MG/DL — HIGH
MCHC RBC-ENTMCNC: 31.2 PG — SIGNIFICANT CHANGE UP (ref 27–34)
MCHC RBC-ENTMCNC: 34.2 GM/DL — SIGNIFICANT CHANGE UP (ref 32–36)
MCV RBC AUTO: 91.3 FL — SIGNIFICANT CHANGE UP (ref 80–100)
NON HDL CHOLESTEROL: 133 MG/DL — HIGH
NRBC # BLD: 0 /100 WBCS — SIGNIFICANT CHANGE UP (ref 0–0)
PLATELET # BLD AUTO: 318 K/UL — SIGNIFICANT CHANGE UP (ref 150–400)
POTASSIUM SERPL-MCNC: 3.9 MMOL/L — SIGNIFICANT CHANGE UP (ref 3.5–5.3)
POTASSIUM SERPL-SCNC: 3.9 MMOL/L — SIGNIFICANT CHANGE UP (ref 3.5–5.3)
RBC # BLD: 4.01 M/UL — SIGNIFICANT CHANGE UP (ref 3.8–5.2)
RBC # FLD: 13.2 % — SIGNIFICANT CHANGE UP (ref 10.3–14.5)
SODIUM SERPL-SCNC: 133 MMOL/L — LOW (ref 135–145)
TRIGL SERPL-MCNC: 137 MG/DL — SIGNIFICANT CHANGE UP
WBC # BLD: 11.57 K/UL — HIGH (ref 3.8–10.5)
WBC # FLD AUTO: 11.57 K/UL — HIGH (ref 3.8–10.5)

## 2021-12-02 PROCEDURE — 99232 SBSQ HOSP IP/OBS MODERATE 35: CPT

## 2021-12-02 RX ORDER — CLOPIDOGREL BISULFATE 75 MG/1
1 TABLET, FILM COATED ORAL
Qty: 30 | Refills: 0
Start: 2021-12-02 | End: 2021-12-31

## 2021-12-02 RX ORDER — ATORVASTATIN CALCIUM 80 MG/1
1 TABLET, FILM COATED ORAL
Qty: 0 | Refills: 0 | DISCHARGE

## 2021-12-02 RX ORDER — CANDESARTAN CILEXETIL 8 MG/1
1 TABLET ORAL
Qty: 0 | Refills: 0 | DISCHARGE

## 2021-12-02 RX ORDER — ASPIRIN/CALCIUM CARB/MAGNESIUM 324 MG
1 TABLET ORAL
Qty: 30 | Refills: 0
Start: 2021-12-02 | End: 2021-12-31

## 2021-12-02 RX ORDER — CANDESARTAN CILEXETIL 8 MG/1
1 TABLET ORAL
Qty: 60 | Refills: 0
Start: 2021-12-02 | End: 2021-12-31

## 2021-12-02 RX ORDER — ATORVASTATIN CALCIUM 80 MG/1
1 TABLET, FILM COATED ORAL
Qty: 30 | Refills: 0
Start: 2021-12-02 | End: 2021-12-31

## 2021-12-02 RX ORDER — HYDRALAZINE HCL 50 MG
1 TABLET ORAL
Qty: 90 | Refills: 0
Start: 2021-12-02 | End: 2021-12-31

## 2021-12-02 RX ORDER — AMLODIPINE BESYLATE 2.5 MG/1
1 TABLET ORAL
Qty: 30 | Refills: 0
Start: 2021-12-02 | End: 2021-12-31

## 2021-12-02 RX ADMIN — CLOPIDOGREL BISULFATE 75 MILLIGRAM(S): 75 TABLET, FILM COATED ORAL at 11:39

## 2021-12-02 RX ADMIN — Medication 200 MILLIGRAM(S): at 13:06

## 2021-12-02 RX ADMIN — Medication 125 MICROGRAM(S): at 06:40

## 2021-12-02 RX ADMIN — Medication 325 MILLIGRAM(S): at 11:38

## 2021-12-02 RX ADMIN — LOSARTAN POTASSIUM 50 MILLIGRAM(S): 100 TABLET, FILM COATED ORAL at 06:40

## 2021-12-02 RX ADMIN — Medication 81 MILLIGRAM(S): at 11:39

## 2021-12-02 RX ADMIN — AMLODIPINE BESYLATE 10 MILLIGRAM(S): 2.5 TABLET ORAL at 10:11

## 2021-12-02 RX ADMIN — Medication 200 MILLIGRAM(S): at 06:40

## 2021-12-02 RX ADMIN — POLYETHYLENE GLYCOL 3350 17 GRAM(S): 17 POWDER, FOR SOLUTION ORAL at 11:39

## 2021-12-02 RX ADMIN — ENOXAPARIN SODIUM 40 MILLIGRAM(S): 100 INJECTION SUBCUTANEOUS at 11:38

## 2021-12-02 RX ADMIN — Medication 1 TABLET(S): at 11:39

## 2021-12-02 RX ADMIN — Medication 2000 UNIT(S): at 11:39

## 2021-12-02 NOTE — PROGRESS NOTE ADULT - PROVIDER SPECIALTY LIST ADULT
Internal Medicine
Cardiology
Neurology
Cardiology
Neurology
Cardiology
Neurology
Internal Medicine

## 2021-12-02 NOTE — PROGRESS NOTE ADULT - PROBLEM SELECTOR PLAN 4
Start home dose of levothyroxine 125mcg qdaily.    poss dc after neuro / cards clearance

## 2021-12-02 NOTE — CHART NOTE - NSCHARTNOTEFT_GEN_A_CORE
Discussed with daughter re plan for MRI of head. Son and daughter refused the MRI because it will not  as they do not want any procedures performed  MRI discontinued    [id number]
Elevated blood pressure - increased Amlodipine to 10mg and Losartan to 50mg BID  B/P now is 190/100 - pt asymptomatic and calm  Discussed with cards - Hydralazine 10mg IV x 1 now  Monitor    [id number]
MEDICINE NP    ALEX VILLALPANDO  89y Female    Patient is a 89y old  Female who presents with a chief complaint of confusion (30 Nov 2021 10:33)         > Event Summary:   Notified by RN, Patient with recurrent elevated /114 and seen at bedside.  Patient AOX1, in bed, responsive and communicates, in  NAD.    -Hydralazine 10mg ivp x1 for rpt /90  -C/w Cozaar and Norvasc PO and monitor  -Will endorse to day team     -Vital Signs Last 24 Hrs  T(C): 36.5 (01 Dec 2021 04:56), Max: 37 (30 Nov 2021 20:22)  T(F): 97.7 (01 Dec 2021 04:56), Max: 98.6 (30 Nov 2021 20:22)  HR: 87 (01 Dec 2021 04:56) (78 - 88)  BP: 173/90 (01 Dec 2021 06:04) (145/83 - 190/100)  RR: 18 (01 Dec 2021 04:56) (18 - 18)  SpO2: 95% (01 Dec 2021 04:56) (94% - 95%)        YOLA Valdivia-BC  Medicine Department  #[id number]
MEDICINE PA EPISODIC NOTE    Notified by RN of pt. having BP of 190/110. Pt. seen and examined at bedside, sleeping, in NAD. IV hydralazine 5 mg IVP ordered. Will continue to monitor BP and c/w current BP med regimen. Will endorse to day team in AM.    Christina Patricio PA-C  Dept. of Medicine  [facility] [phone]

## 2021-12-02 NOTE — PROGRESS NOTE ADULT - SUBJECTIVE AND OBJECTIVE BOX
[facility] - Vicente Estevez, Lidya, Shimon, Marlena, Tracey Bateman  Office Number:  [phone]    Patient resting comfortably in bed in NAD.  Laying flat with no respiratory distress.  No complaints of chest pain, dyspnea, palpitations, PND, or orthopnea.  says she feels ok    ROS: negative unless otherwise mentioned.    Telemetry:  sr    MEDICATIONS  (STANDING):  amLODIPine   Tablet 10 milliGRAM(s) Oral daily  atorvastatin 40 milliGRAM(s) Oral at bedtime  carBAMazepine 200 milliGRAM(s) Oral three times a day  cholecalciferol 2000 Unit(s) Oral daily  clopidogrel Tablet 75 milliGRAM(s) Oral daily  enoxaparin Injectable 40 milliGRAM(s) SubCutaneous daily  ferrous    sulfate 325 milliGRAM(s) Oral daily  levothyroxine 125 MICROGram(s) Oral daily  losartan 25 milliGRAM(s) Oral two times a day  multivitamin 1 Tablet(s) Oral daily    MEDICATIONS  (PRN):  acetaminophen     Tablet .. 650 milliGRAM(s) Oral every 6 hours PRN Temp greater or equal to 38C (100.4F), Mild Pain (1 - 3)      Allergies    Ambien (Unknown)  aspirin (Unknown)  sulfa drugs (Unknown)    Intolerances        Vital Signs Last 24 Hrs  T(C): 36.7 (30 Nov 2021 04:00), Max: 37.1 (29 Nov 2021 21:41)  T(F): 98 (30 Nov 2021 04:00), Max: 98.8 (29 Nov 2021 21:41)  HR: 75 (30 Nov 2021 04:00) (75 - 87)  BP: 186/90 (30 Nov 2021 09:37) (123/77 - 192/101)  BP(mean): --  RR: 18 (30 Nov 2021 04:00) (18 - 18)  SpO2: 97% (30 Nov 2021 04:00) (94% - 97%)    I&O's Summary      ON EXAM:    Constitutional: NAD, awake and alert, well-developed  HEENT: Moist Mucous Membranes, Anicteric  Pulmonary: Decreased breath sounds b/l. No rales, crackles or wheeze appreciated.   Cardiovascular: Regular, S1 and S2, 3/6 SM  Gastrointestinal: Bowel Sounds present, soft, nontender.   Lymph: No peripheral edema. No lymphadenopathy.  Skin: No visible rashes or ulcers.  Psych:  Mood & affect appropriate, confused    LABS: All Labs Reviewed:                        11.7   9.48  )-----------( 262      ( 30 Nov 2021 05:55 )             34.4                         11.8   9.95  )-----------( 295      ( 28 Nov 2021 19:44 )             35.3     30 Nov 2021 05:55    133    |  98     |  18     ----------------------------<  88     3.7     |  16     |  0.70   28 Nov 2021 19:44    136    |  101    |  20     ----------------------------<  165    3.8     |  22     |  1.06     Ca    9.1        30 Nov 2021 05:55  Ca    8.5        28 Nov 2021 19:44    TPro  6.3    /  Alb  4.1    /  TBili  0.3    /  DBili  x      /  AST  21     /  ALT  20     /  AlkPhos  81     28 Nov 2021 19:44    PT/INR - ( 28 Nov 2021 19:44 )   PT: 12.1 sec;   INR: 1.01 ratio         PTT - ( 28 Nov 2021 19:44 )  PTT:33.0 sec      Blood Culture:     11-29 @ 08:54  TSH: 12.60  11-29 @ 00:25  TSH: 24.00    
Neurology Progress Note    S: Patient seen and examined. No new events overnight. dc plan    MEDICATIONS  (STANDING):  amLODIPine   Tablet 10 milliGRAM(s) Oral daily  aspirin  chewable 81 milliGRAM(s) Oral daily  atorvastatin 40 milliGRAM(s) Oral at bedtime  carBAMazepine 200 milliGRAM(s) Oral three times a day  cholecalciferol 2000 Unit(s) Oral daily  clopidogrel Tablet 75 milliGRAM(s) Oral daily  enoxaparin Injectable 40 milliGRAM(s) SubCutaneous daily  ferrous    sulfate 325 milliGRAM(s) Oral daily  levothyroxine 125 MICROGram(s) Oral daily  losartan 50 milliGRAM(s) Oral two times a day  multivitamin 1 Tablet(s) Oral daily  polyethylene glycol 3350 17 Gram(s) Oral daily  potassium chloride    Tablet ER 20 milliEquivalent(s) Oral every 2 hours  senna 2 Tablet(s) Oral at bedtime    MEDICATIONS  (PRN):  acetaminophen     Tablet .. 650 milliGRAM(s) Oral every 6 hours PRN Temp greater or equal to 38C (100.4F), Mild Pain (1 - 3)        Vitals:  Vital Signs Last 24 Hrs  T(C): 36.4 (21 @ 15:00), Max: 36.9 (21 @ 11:17)  T(F): 97.5 (21 @ 15:00), Max: 98.5 (21 @ 11:17)  HR: 89 (21 @ 15:00) (89 - 92)  BP: 132/85 (21 @ 15:00) (108/69 - 190/110)  BP(mean): --  RR: 18 (21 @ 15:00) (18 - 18)  SpO2: 94% (21 @ 15:00) (94% - 96%)      Orthostatic VS  21 @ 12:53  Lying BP: 166/97 HR: 87  Sitting BP: 161/105 HR: 87  Standing BP: 179/107 HR: 94  Site: upper left arm  Mode: electronic      General Exam:   General Appearance: Appropriately dressed and in no acute distress       Head: Normocephalic, atraumatic and no dysmorphic features  Ear, Nose, and Throat: Moist mucous membranes  CVS: S1S2+  Resp: No SOB, no wheeze or rhonchi  Abd: soft NTND  Extremities: No edema, no cyanosis  Skin: No bruises, no rashes     Neurological Exam:  Mental Status: Awake, alert and oriented x 1.  Able to follow simple   verbal commands at times Able to name and repeat. fluent speech. No obvious aphasia mild dysarthria noted.   Cranial Nerves: PERRL, EOMI, VFFC, sensation V1-V3 intact,  no obvious facial asymmetry , equal elevation of palate, scm/trap 5/5, tongue is midline on protrusion. no obvious papilledema on fundoscopic exam. Hearing is grossly intact.   Motor: Normal bulk, tone and strength throughout. Fine finger movements were intact and symmetric. no tremors or drift noted.    Sensation: Intact to light touch and pinprick throughout. no right/left confusion. no extinction to tactile on DSS. Romberg was negative.   Reflexes: 1+ throughout at biceps, brachioradialis, triceps, patellars and ankles bilaterally and equal. No clonus. R toe and L toe were both downgoing.  Coordination: No dysmetria on FNF  Gait: deferred     I personally reviewed the below data/images/labs:    CBC Full  -  ( 02 Dec 2021 05:47 )  WBC Count : 11.57 K/uL  RBC Count : 4.01 M/uL  Hemoglobin : 12.5 g/dL  Hematocrit : 36.6 %  Platelet Count - Automated : 318 K/uL  Mean Cell Volume : 91.3 fl  Mean Cell Hemoglobin : 31.2 pg  Mean Cell Hemoglobin Concentration : 34.2 gm/dL  Auto Neutrophil # : x  Auto Lymphocyte # : x  Auto Monocyte # : x  Auto Eosinophil # : x  Auto Basophil # : x  Auto Neutrophil % : x  Auto Lymphocyte % : x  Auto Monocyte % : x  Auto Eosinophil % : x  Auto Basophil % : x        133<L>  |  100  |  23  ----------------------------<  110<H>  3.9   |  17<L>  |  0.65    Ca    8.9      02 Dec 2021 06:38  Phos  3.6     12  Mg     1.9               PT/INR - ( 2021 19:44 )   PT: 12.1 sec;   INR: 1.01 ratio         PTT - ( 2021 19:44 )  PTT:33.0 sec  Urinalysis Basic - ( 2021 22:25 )    Color: Yellow / Appearance: Clear / S.035 / pH: x  Gluc: x / Ketone: Negative  / Bili: Negative / Urobili: Negative   Blood: x / Protein: 30 mg/dL / Nitrite: Negative   Leuk Esterase: Negative / RBC: 4 /hpf / WBC 3 /HPF   Sq Epi: x / Non Sq Epi: 1 /hpf / Bacteria: Negative    Small focal hypodensity within the left occipital lobe, concerning for recent infarct, new since 2021. No acute intracranial hemorrhage.    More sensitive evaluation for acute ischemia with brain MRI may be obtained, if no contraindications exist.      CTA Neck: No significant flow-limiting stenosis or evidence of acute dissection within the cervical carotid or vertebral arteries.    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    CT Perfusion: Perfusion imaging predicts no core infarct. There is a predicted volume of 99 mL penumbra of tissue at risk, predominantly located within the left PCA territory.    
    SUBJECTIVE / OVERNIGHT EVENTS:pt seen and examined    MEDICATIONS  (STANDING):  amLODIPine   Tablet 10 milliGRAM(s) Oral daily  aspirin  chewable 81 milliGRAM(s) Oral daily  atorvastatin 40 milliGRAM(s) Oral at bedtime  carBAMazepine 200 milliGRAM(s) Oral three times a day  cholecalciferol 2000 Unit(s) Oral daily  clopidogrel Tablet 75 milliGRAM(s) Oral daily  enoxaparin Injectable 40 milliGRAM(s) SubCutaneous daily  ferrous    sulfate 325 milliGRAM(s) Oral daily  levothyroxine 125 MICROGram(s) Oral daily  losartan 50 milliGRAM(s) Oral two times a day  multivitamin 1 Tablet(s) Oral daily  polyethylene glycol 3350 17 Gram(s) Oral daily  potassium chloride    Tablet ER 20 milliEquivalent(s) Oral every 2 hours  senna 2 Tablet(s) Oral at bedtime    MEDICATIONS  (PRN):  acetaminophen     Tablet .. 650 milliGRAM(s) Oral every 6 hours PRN Temp greater or equal to 38C (100.4F), Mild Pain (1 - 3)    Vital Signs Last 24 Hrs  T(C): 36.4 (21 @ 15:00), Max: 36.9 (21 @ 11:17)  T(F): 97.5 (21 @ 15:00), Max: 98.5 (21 @ 11:17)  HR: 89 (21 @ 15:00) (89 - 95)  BP: 132/85 (21 @ 15:00) (108/69 - 190/110)  BP(mean): --  RR: 18 (21 @ 15:00) (18 - 18)  SpO2: 94% (21 @ 15:00) (94% - 96%)      OPHYSICAL EXAM:  GENERAL: NAD  EYES: EOMI, PERRLA  NECK: Supple, No JVD  CHEST/LUNG: dec breath sounds at bases  HEART:  S1 , S2 +  ABDOMEN: soft , bs+  EXTREMITIES:  no edema  NEUROLOGY:alert awake confused    LABS:      133<L>  |  100  |  23  ----------------------------<  110<H>  3.9   |  17<L>  |  0.65    Ca    8.9      02 Dec 2021 06:38  Phos  3.6     12-  Mg     1.9     12-      Creatinine Trend: 0.65 <--, 0.66 <--, 0.70 <--, 1.06 <--                        12.5   11.57 )-----------( 318      ( 02 Dec 2021 05:47 )             36.6     Urine Studies:  Urinalysis Basic - ( 2021 22:25 )    Color: Yellow / Appearance: Clear / S.035 / pH:   Gluc:  / Ketone: Negative  / Bili: Negative / Urobili: Negative   Blood:  / Protein: 30 mg/dL / Nitrite: Negative   Leuk Esterase: Negative / RBC: 4 /hpf / WBC 3 /HPF   Sq Epi:  / Non Sq Epi: 1 /hpf / Bacteria: Negative                  
Neurology Progress Note    S: Patient seen and examined. No new events overnight. stitting up doing okay     Medication:  acetaminophen     Tablet .. 650 milliGRAM(s) Oral every 6 hours PRN  amLODIPine   Tablet 10 milliGRAM(s) Oral daily  atorvastatin 40 milliGRAM(s) Oral at bedtime  carBAMazepine 200 milliGRAM(s) Oral three times a day  cholecalciferol 2000 Unit(s) Oral daily  clopidogrel Tablet 75 milliGRAM(s) Oral daily  enoxaparin Injectable 40 milliGRAM(s) SubCutaneous daily  ferrous    sulfate 325 milliGRAM(s) Oral daily  levothyroxine 125 MICROGram(s) Oral daily  losartan 50 milliGRAM(s) Oral two times a day  losartan 25 milliGRAM(s) Oral once  multivitamin 1 Tablet(s) Oral daily      Vitals:  Vital Signs Last 24 Hrs  T(C): 36.7 (2021 04:00), Max: 37.1 (2021 21:41)  T(F): 98 (2021 04:00), Max: 98.8 (2021 21:41)  HR: 75 (2021 04:00) (75 - 87)  BP: 186/90 (2021 09:37) (123/77 - 192/101)  BP(mean): --  RR: 18 (2021 04:00) (18 - 18)  SpO2: 97% (2021 04:00) (94% - 97%)    General Exam:   General Appearance: Appropriately dressed and in no acute distress       Head: Normocephalic, atraumatic and no dysmorphic features  Ear, Nose, and Throat: Moist mucous membranes  CVS: S1S2+  Resp: No SOB, no wheeze or rhonchi  Abd: soft NTND  Extremities: No edema, no cyanosis  Skin: No bruises, no rashes     Neurological Exam:  Mental Status: Awake, alert and oriented x 1.  Able to follow simple   verbal commands at times Able to name and repeat. fluent speech. No obvious aphasia mild dysarthria noted.   Cranial Nerves: PERRL, EOMI, VFFC, sensation V1-V3 intact,  no obvious facial asymmetry , equal elevation of palate, scm/trap 5/5, tongue is midline on protrusion. no obvious papilledema on fundoscopic exam. Hearing is grossly intact.   Motor: Normal bulk, tone and strength throughout. Fine finger movements were intact and symmetric. no tremors or drift noted.    Sensation: Intact to light touch and pinprick throughout. no right/left confusion. no extinction to tactile on DSS. Romberg was negative.   Reflexes: 1+ throughout at biceps, brachioradialis, triceps, patellars and ankles bilaterally and equal. No clonus. R toe and L toe were both downgoing.  Coordination: No dysmetria on FNF  Gait: deferred     I personally reviewed the below data/images/labs:      CBC Full  -  ( 2021 05:55 )  WBC Count : 9.48 K/uL  RBC Count : 3.74 M/uL  Hemoglobin : 11.7 g/dL  Hematocrit : 34.4 %  Platelet Count - Automated : 262 K/uL  Mean Cell Volume : 92.0 fl  Mean Cell Hemoglobin : 31.3 pg  Mean Cell Hemoglobin Concentration : 34.0 gm/dL  Auto Neutrophil # : x  Auto Lymphocyte # : x  Auto Monocyte # : x  Auto Eosinophil # : x  Auto Basophil # : x  Auto Neutrophil % : x  Auto Lymphocyte % : x  Auto Monocyte % : x  Auto Eosinophil % : x  Auto Basophil % : x    11-30    133<L>  |  98  |  18  ----------------------------<  88  3.7   |  16<L>  |  0.70    Ca    9.1      2021 05:55    TPro  6.3  /  Alb  4.1  /  TBili  0.3  /  DBili  x   /  AST  21  /  ALT  20  /  AlkPhos  81  11-28    LIVER FUNCTIONS - ( 2021 19:44 )  Alb: 4.1 g/dL / Pro: 6.3 g/dL / ALK PHOS: 81 U/L / ALT: 20 U/L / AST: 21 U/L / GGT: x           PT/INR - ( 2021 19:44 )   PT: 12.1 sec;   INR: 1.01 ratio         PTT - ( 2021 19:44 )  PTT:33.0 sec  Urinalysis Basic - ( 2021 22:25 )    Color: Yellow / Appearance: Clear / S.035 / pH: x  Gluc: x / Ketone: Negative  / Bili: Negative / Urobili: Negative   Blood: x / Protein: 30 mg/dL / Nitrite: Negative   Leuk Esterase: Negative / RBC: 4 /hpf / WBC 3 /HPF   Sq Epi: x / Non Sq Epi: 1 /hpf / Bacteria: Negative    Small focal hypodensity within the left occipital lobe, concerning for recent infarct, new since 2021. No acute intracranial hemorrhage.    More sensitive evaluation for acute ischemia with brain MRI may be obtained, if no contraindications exist.      CTA Neck: No significant flow-limiting stenosis or evidence of acute dissection within the cervical carotid or vertebral arteries.    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    CT Perfusion: Perfusion imaging predicts no core infarct. There is a predicted volume of 99 mL penumbra of tissue at risk, predominantly located within the left PCA territory.    
[facility]    Vicente Estevez, Lidya, Shimon, Marlena, Fransico, Tracey      [phone]    CHIEF COMPLAINT: Patient is a 89y old  Female who presents with a chief complaint of confusion (30 Nov 2021 10:33)      Follow Up: murmur, altered ms    Interim history: The patient reports no new symptoms.  Denies chest discomfort and shortness of breath.  No abdominal pain.  No new neurologic symptoms.      MEDICATIONS  (STANDING):  amLODIPine   Tablet 10 milliGRAM(s) Oral daily  atorvastatin 40 milliGRAM(s) Oral at bedtime  carBAMazepine 200 milliGRAM(s) Oral three times a day  cholecalciferol 2000 Unit(s) Oral daily  clopidogrel Tablet 75 milliGRAM(s) Oral daily  enoxaparin Injectable 40 milliGRAM(s) SubCutaneous daily  ferrous    sulfate 325 milliGRAM(s) Oral daily  levothyroxine 125 MICROGram(s) Oral daily  losartan 50 milliGRAM(s) Oral two times a day  multivitamin 1 Tablet(s) Oral daily  potassium chloride    Tablet ER 20 milliEquivalent(s) Oral every 2 hours  senna 2 Tablet(s) Oral at bedtime    MEDICATIONS  (PRN):  acetaminophen     Tablet .. 650 milliGRAM(s) Oral every 6 hours PRN Temp greater or equal to 38C (100.4F), Mild Pain (1 - 3)      REVIEW OF SYSTEMS:  eye, ent, GI, , allergic, dermatologic, musculoskeletal and neurologic are negative except as described above    Vital Signs Last 24 Hrs  T(C): 36.7 (01 Dec 2021 11:41), Max: 37 (30 Nov 2021 20:22)  T(F): 98.1 (01 Dec 2021 11:41), Max: 98.6 (30 Nov 2021 20:22)  HR: 84 (01 Dec 2021 11:41) (78 - 91)  BP: 147/84 (01 Dec 2021 11:41) (124/71 - 190/100)  BP(mean): --  RR: 18 (01 Dec 2021 11:41) (17 - 18)  SpO2: 94% (01 Dec 2021 11:41) (94% - 96%)    I&O's Summary    30 Nov 2021 07:01  -  01 Dec 2021 07:00  --------------------------------------------------------  IN: 120 mL / OUT: 0 mL / NET: 120 mL        Telemetry past 24h: sr    PHYSICAL EXAM:    Constitutional: well-nourished, well-developed, NAD   HEENT:  MMM, sclerae anicteric, conjunctivae clear, no oral cyanosis.  Pulmonary: Non-labored, breath sounds are clear bilaterally, No wheezing, rales or rhonchi  Cardiovascular: Regular, S1 and S2.  2-3/6 sys murmur sev as.  No rubs, gallops or clicks  Gastrointestinal: Bowel Sounds present, soft, nontender.   Lymph: No peripheral edema.   Neurological: Alert, no focal deficits  Skin: No rashes.  Psych:  Mood & affect appropriate    LABS: All Labs Reviewed:                        11.7   9.48  )-----------( 262      ( 30 Nov 2021 05:55 )             34.4                         11.8   9.95  )-----------( 295      ( 28 Nov 2021 19:44 )             35.3     01 Dec 2021 06:17    135    |  99     |  17     ----------------------------<  87     3.5     |  22     |  0.66   30 Nov 2021 05:55    133    |  98     |  18     ----------------------------<  88     3.7     |  16     |  0.70   28 Nov 2021 19:44    136    |  101    |  20     ----------------------------<  165    3.8     |  22     |  1.06     Ca    8.7        01 Dec 2021 06:17  Ca    9.1        30 Nov 2021 05:55  Ca    8.5        28 Nov 2021 19:44  Phos  3.6       01 Dec 2021 06:17  Mg     1.9       01 Dec 2021 06:17    TPro  6.3    /  Alb  4.1    /  TBili  0.3    /  DBili  x      /  AST  21     /  ALT  20     /  AlkPhos  81     28 Nov 2021 19:44          Blood Culture:     11-29 @ 08:54  TSH: 12.60  11-29 @ 00:25  TSH: 24.00      RADIOLOGY:    EKG:    Echo:    
pt was evaluated by hospitalist earlier during the day   labs , vitals reviewed
[facility] - Vicente Estevez, Lidya, Shimon, Marlena, Tracey Bateman  Office Number:  [phone]    Patient resting comfortably in bed in NAD.  Laying flat with no respiratory distress.  No complaints of chest pain, dyspnea, palpitations, PND, or orthopnea.  bp has been elevated and labile    ROS: negative unless otherwise mentioned.    Telemetry:  sr 90-100s    MEDICATIONS  (STANDING):  amLODIPine   Tablet 10 milliGRAM(s) Oral daily  aspirin  chewable 81 milliGRAM(s) Oral daily  atorvastatin 40 milliGRAM(s) Oral at bedtime  carBAMazepine 200 milliGRAM(s) Oral three times a day  cholecalciferol 2000 Unit(s) Oral daily  clopidogrel Tablet 75 milliGRAM(s) Oral daily  enoxaparin Injectable 40 milliGRAM(s) SubCutaneous daily  ferrous    sulfate 325 milliGRAM(s) Oral daily  levothyroxine 125 MICROGram(s) Oral daily  losartan 50 milliGRAM(s) Oral two times a day  multivitamin 1 Tablet(s) Oral daily  polyethylene glycol 3350 17 Gram(s) Oral daily  potassium chloride    Tablet ER 20 milliEquivalent(s) Oral every 2 hours  senna 2 Tablet(s) Oral at bedtime    MEDICATIONS  (PRN):  acetaminophen     Tablet .. 650 milliGRAM(s) Oral every 6 hours PRN Temp greater or equal to 38C (100.4F), Mild Pain (1 - 3)      Allergies    Ambien (Unknown)  aspirin (Unknown)  sulfa drugs (Unknown)    Intolerances        Vital Signs Last 24 Hrs  T(C): 36.3 (02 Dec 2021 04:06), Max: 36.7 (01 Dec 2021 11:41)  T(F): 97.4 (02 Dec 2021 04:06), Max: 98.1 (01 Dec 2021 11:41)  HR: 92 (02 Dec 2021 04:06) (84 - 95)  BP: 147/93 (02 Dec 2021 04:06) (147/84 - 190/110)  BP(mean): --  RR: 18 (02 Dec 2021 04:06) (18 - 18)  SpO2: 94% (02 Dec 2021 04:06) (94% - 96%)    I&O's Summary    01 Dec 2021 07:01  -  02 Dec 2021 07:00  --------------------------------------------------------  IN: 120 mL / OUT: 0 mL / NET: 120 mL    02 Dec 2021 07:01  -  02 Dec 2021 09:52  --------------------------------------------------------  IN: 120 mL / OUT: 0 mL / NET: 120 mL        ON EXAM:    Constitutional: well-nourished, well-developed, NAD   HEENT:  MMM, sclerae anicteric, conjunctivae clear, no oral cyanosis.  Pulmonary: Non-labored, breath sounds are clear bilaterally, No wheezing, rales or rhonchi  Cardiovascular: Regular, S1 and S2.  2-3/6 sys murmur sev as.  No rubs, gallops or clicks  Gastrointestinal: Bowel Sounds present, soft, nontender.   Lymph: No peripheral edema.   Neurological: Alert, no focal deficits  Skin: No rashes.  Psych:  Mood & affect appropriate  LABS: All Labs Reviewed:                        12.5   11.57 )-----------( 318      ( 02 Dec 2021 05:47 )             36.6                         11.7   9.48  )-----------( 262      ( 30 Nov 2021 05:55 )             34.4     02 Dec 2021 06:38    133    |  100    |  23     ----------------------------<  110    3.9     |  17     |  0.65   01 Dec 2021 06:17    135    |  99     |  17     ----------------------------<  87     3.5     |  22     |  0.66   30 Nov 2021 05:55    133    |  98     |  18     ----------------------------<  88     3.7     |  16     |  0.70     Ca    8.9        02 Dec 2021 06:38  Ca    8.7        01 Dec 2021 06:17  Ca    9.1        30 Nov 2021 05:55  Phos  3.6       01 Dec 2021 06:17  Mg     1.9       01 Dec 2021 06:17            Blood Culture:       
Neurology Progress Note    S: Patient seen and examined. No new events overnight. stitting up doing okay BP elevated overnight     Medication:  MEDICATIONS  (STANDING):  amLODIPine   Tablet 10 milliGRAM(s) Oral daily  aspirin  chewable 81 milliGRAM(s) Oral daily  atorvastatin 40 milliGRAM(s) Oral at bedtime  carBAMazepine 200 milliGRAM(s) Oral three times a day  cholecalciferol 2000 Unit(s) Oral daily  clopidogrel Tablet 75 milliGRAM(s) Oral daily  enoxaparin Injectable 40 milliGRAM(s) SubCutaneous daily  ferrous    sulfate 325 milliGRAM(s) Oral daily  levothyroxine 125 MICROGram(s) Oral daily  losartan 50 milliGRAM(s) Oral two times a day  multivitamin 1 Tablet(s) Oral daily  polyethylene glycol 3350 17 Gram(s) Oral daily  potassium chloride    Tablet ER 20 milliEquivalent(s) Oral every 2 hours  senna 2 Tablet(s) Oral at bedtime    MEDICATIONS  (PRN):  acetaminophen     Tablet .. 650 milliGRAM(s) Oral every 6 hours PRN Temp greater or equal to 38C (100.4F), Mild Pain (1 - 3)        Vitals:  Vital Signs Last 24 Hrs  T(C): 36.7 (21 @ 11:41), Max: 37 (21 @ 20:22)  T(F): 98.1 (21 @ 11:41), Max: 98.6 (21 @ 20:22)  HR: 84 (21 @ 11:41) (78 - 91)  BP: 147/84 (21 @ 11:41) (124/71 - 190/100)  BP(mean): --  RR: 18 (21 @ 11:41) (17 - 18)  SpO2: 94% (21 @ 11:41) (94% - 96%)    Orthostatic VS  21 @ 12:53  Lying BP: 166/97 HR: 87  Sitting BP: 161/105 HR: 87  Standing BP: 179/107 HR: 94  Site: upper left arm  Mode: electronic      General Exam:   General Appearance: Appropriately dressed and in no acute distress       Head: Normocephalic, atraumatic and no dysmorphic features  Ear, Nose, and Throat: Moist mucous membranes  CVS: S1S2+  Resp: No SOB, no wheeze or rhonchi  Abd: soft NTND  Extremities: No edema, no cyanosis  Skin: No bruises, no rashes     Neurological Exam:  Mental Status: Awake, alert and oriented x 1.  Able to follow simple   verbal commands at times Able to name and repeat. fluent speech. No obvious aphasia mild dysarthria noted.   Cranial Nerves: PERRL, EOMI, VFFC, sensation V1-V3 intact,  no obvious facial asymmetry , equal elevation of palate, scm/trap 5/5, tongue is midline on protrusion. no obvious papilledema on fundoscopic exam. Hearing is grossly intact.   Motor: Normal bulk, tone and strength throughout. Fine finger movements were intact and symmetric. no tremors or drift noted.    Sensation: Intact to light touch and pinprick throughout. no right/left confusion. no extinction to tactile on DSS. Romberg was negative.   Reflexes: 1+ throughout at biceps, brachioradialis, triceps, patellars and ankles bilaterally and equal. No clonus. R toe and L toe were both downgoing.  Coordination: No dysmetria on FNF  Gait: deferred     I personally reviewed the below data/images/labs:      CBC Full  -  ( 2021 05:55 )  WBC Count : 9.48 K/uL  RBC Count : 3.74 M/uL  Hemoglobin : 11.7 g/dL  Hematocrit : 34.4 %  Platelet Count - Automated : 262 K/uL  Mean Cell Volume : 92.0 fl  Mean Cell Hemoglobin : 31.3 pg  Mean Cell Hemoglobin Concentration : 34.0 gm/dL  Auto Neutrophil # : x  Auto Lymphocyte # : x  Auto Monocyte # : x  Auto Eosinophil # : x  Auto Basophil # : x  Auto Neutrophil % : x  Auto Lymphocyte % : x  Auto Monocyte % : x  Auto Eosinophil % : x  Auto Basophil % : x        135  |  99  |  17  ----------------------------<  87  3.5   |  22  |  0.66    Ca    8.7      01 Dec 2021 06:17  Phos  3.6     12  Mg     1.9             PT/INR - ( 2021 19:44 )   PT: 12.1 sec;   INR: 1.01 ratio         PTT - ( 2021 19:44 )  PTT:33.0 sec  Urinalysis Basic - ( 2021 22:25 )    Color: Yellow / Appearance: Clear / S.035 / pH: x  Gluc: x / Ketone: Negative  / Bili: Negative / Urobili: Negative   Blood: x / Protein: 30 mg/dL / Nitrite: Negative   Leuk Esterase: Negative / RBC: 4 /hpf / WBC 3 /HPF   Sq Epi: x / Non Sq Epi: 1 /hpf / Bacteria: Negative    Small focal hypodensity within the left occipital lobe, concerning for recent infarct, new since 2021. No acute intracranial hemorrhage.    More sensitive evaluation for acute ischemia with brain MRI may be obtained, if no contraindications exist.      CTA Neck: No significant flow-limiting stenosis or evidence of acute dissection within the cervical carotid or vertebral arteries.    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    CT Perfusion: Perfusion imaging predicts no core infarct. There is a predicted volume of 99 mL penumbra of tissue at risk, predominantly located within the left PCA territory.    
    SUBJECTIVE / OVERNIGHT EVENTS:pt seen and examined    MEDICATIONS  (STANDING):  amLODIPine   Tablet 10 milliGRAM(s) Oral daily  aspirin  chewable 81 milliGRAM(s) Oral daily  atorvastatin 40 milliGRAM(s) Oral at bedtime  carBAMazepine 200 milliGRAM(s) Oral three times a day  cholecalciferol 2000 Unit(s) Oral daily  clopidogrel Tablet 75 milliGRAM(s) Oral daily  enoxaparin Injectable 40 milliGRAM(s) SubCutaneous daily  ferrous    sulfate 325 milliGRAM(s) Oral daily  levothyroxine 125 MICROGram(s) Oral daily  losartan 50 milliGRAM(s) Oral two times a day  multivitamin 1 Tablet(s) Oral daily  polyethylene glycol 3350 17 Gram(s) Oral daily  potassium chloride    Tablet ER 20 milliEquivalent(s) Oral every 2 hours  senna 2 Tablet(s) Oral at bedtime    MEDICATIONS  (PRN):  acetaminophen     Tablet .. 650 milliGRAM(s) Oral every 6 hours PRN Temp greater or equal to 38C (100.4F), Mild Pain (1 - 3)    Vital Signs Last 24 Hrs  T(C): 36.6 (21 @ 21:18), Max: 36.7 (21 @ 11:41)  T(F): 97.8 (21 @ 21:18), Max: 98.1 (21 @ 11:41)  HR: 92 (21 @ 21:18) (82 - 95)  BP: 190/110 (21 @ 21:18) (124/71 - 190/110)  BP(mean): --  RR: 18 (21 @ 21:18) (17 - 18)  SpO2: 94% (21 @ 21:18) (94% - 96%)    Orthostatic VS  21 @ 12:53  Lying BP: 166/97 HR: 87  Sitting BP: 161/105 HR: 87  Standing BP: 179/107 HR: 94  Site: upper left arm  Mode: electronic    OPHYSICAL EXAM:  GENERAL: NAD  EYES: EOMI, PERRLA  NECK: Supple, No JVD  CHEST/LUNG: dec breath sounds at bases  HEART:  S1 , S2 +  ABDOMEN: soft , bs+  EXTREMITIES:  no edema  NEUROLOGY:alert awake ocnfused      LABS:  12-01    135  |  99  |  17  ----------------------------<  87  3.5   |  22  |  0.66    Ca    8.7      01 Dec 2021 06:17  Phos  3.6     12-  Mg     1.9     12      Creatinine Trend: 0.66 <--, 0.70 <--, 1.06 <--                        11.7   9.48  )-----------( 262      ( 2021 05:55 )             34.4     Urine Studies:  Urinalysis Basic - ( 2021 22:25 )    Color: Yellow / Appearance: Clear / S.035 / pH:   Gluc:  / Ketone: Negative  / Bili: Negative / Urobili: Negative   Blood:  / Protein: 30 mg/dL / Nitrite: Negative   Leuk Esterase: Negative / RBC: 4 /hpf / WBC 3 /HPF   Sq Epi:  / Non Sq Epi: 1 /hpf / Bacteria: Negative                  
    SUBJECTIVE / OVERNIGHT EVENTS:pt seen and examined      MEDICATIONS  (STANDING):  amLODIPine   Tablet 10 milliGRAM(s) Oral daily  atorvastatin 40 milliGRAM(s) Oral at bedtime  carBAMazepine 200 milliGRAM(s) Oral three times a day  cholecalciferol 2000 Unit(s) Oral daily  clopidogrel Tablet 75 milliGRAM(s) Oral daily  enoxaparin Injectable 40 milliGRAM(s) SubCutaneous daily  ferrous    sulfate 325 milliGRAM(s) Oral daily  levothyroxine 125 MICROGram(s) Oral daily  losartan 50 milliGRAM(s) Oral two times a day  multivitamin 1 Tablet(s) Oral daily    MEDICATIONS  (PRN):  acetaminophen     Tablet .. 650 milliGRAM(s) Oral every 6 hours PRN Temp greater or equal to 38C (100.4F), Mild Pain (1 - 3)    Vital Signs Last 24 Hrs  T(C): 37 (11-30-21 @ 20:22), Max: 37 (11-30-21 @ 20:22)  T(F): 98.6 (11-30-21 @ 20:22), Max: 98.6 (11-30-21 @ 20:22)  HR: 85 (11-30-21 @ 20:22) (75 - 85)  BP: 145/83 (11-30-21 @ 20:22) (145/83 - 190/100)  BP(mean): --  RR: 18 (11-30-21 @ 20:22) (18 - 18)  SpO2: 95% (11-30-21 @ 20:22) (94% - 97%)    Orthostatic VS  11-30-21 @ 12:53  Lying BP: 166/97 HR: 87  Sitting BP: 161/105 HR: 87  Standing BP: 179/107 HR: 94  Site: upper left arm  Mode: electronic      CAPILLARY BLOOD GLUCOSE        I&O's Summary    30 Nov 2021 07:01  -  01 Dec 2021 00:09  --------------------------------------------------------  IN: 120 mL / OUT: 0 mL / NET: 120 mL    PHYSICAL EXAM:  GENERAL: NAD  EYES: EOMI, PERRLA  NECK: Supple, No JVD  CHEST/LUNG: dec breath sounds at bases  HEART:  S1 , S2 +  ABDOMEN: soft , bs+  EXTREMITIES:  no edema  NEUROLOGY:alert awake ocnfused      LABS:                        11.7   9.48  )-----------( 262      ( 30 Nov 2021 05:55 )             34.4     11-30    133<L>  |  98  |  18  ----------------------------<  88  3.7   |  16<L>  |  0.70    Ca    9.1      30 Nov 2021 05:55                RADIOLOGY & ADDITIONAL TESTS:    Imaging Personally Reviewed:    Consultant(s) Notes Reviewed:      Care Discussed with Consultants/Other Providers:

## 2021-12-02 NOTE — PROGRESS NOTE ADULT - ASSESSMENT
89 year old female with  dementia, TIA, HTN, HLD, COPD, TGN (on carbemazepine) DM has presented from an assisted facility to the ED as a code stroke at 19:25 on 11/28/21 for being lethargic since 18:30 on 11/28/21. LKW is known. She was found standing in the  and was supposedly confused as well. Upon presentation in ED, pt was completely mute and found to have some effort against gravity in both of her legs.   Pt is not a TPA candidate as LKW is unknown.   Pt is not a thrombectomy candidate as no LVO seen on imaging.   Per son, pt has been getting confused over the past year, possibly from progressive dementia. Pt denies any headache, nausea, numbness, tingling, weakness, changes in vision or hearing,     NIHSS 11 (mute, no age or month, severe dysarthria, both legs- some effort against gravity)  preMRS 3 (pt uses a walker)  Reexam NIHSS 6 (wrong month, didnt know age, legs- some effort against gravity). Pt is conversing. No aphasia or dysarthria noted.    CT head showed small focal hypodensity within left occipital lobe, concerning for new infarct.   CTA head showed severe short segment stenosis vs. subtotal occlusion of L PCA at P1/P2 junction. 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 CT perfusion showed f 99 mL penumbra of tissue at risk, predominantly located within the left PCA territory.  TSH elevated 24, B12 WNL.   Impression: AMS may be secondary to toxic metabolic encephalopathy vs progression of neurodegenerative condition (ie dementia). Stroke on CTH is of competing mechanism; either ICAD vs ESUS.      Recommendations:  - BP elevated f/u cardio   - f/u TFTs, TSH elevated   - Dual antiplatelet therapy with ASA 81mg PO daily and Clopidogrel 75mg PO daily x 3 weeks followed by monotherapy with ASA 81mg PO daily.  - High dose statin therapy - atorvastatin 40mg PO daily. LDL goal <70mg/dL.  - MRI brain w/o if able vs CTH.   - Hemoglobin A1c and lipid panel ordred  - TTE pending   - nsx recs apprecaited. NTD  - telemetry  - likely needs ILR   - PT/OT/SS/SLP, OOBC  - BP normotensive   - check FS, glucose control <180  - GI/DVT ppx  - Counseling on diet, exercise, and medication adherence was done  - Counseling on smoking cessation and alcohol consumption offered when appropriate.  - Pain assessed and judicious use of narcotics when appropriate was discussed.    - Stroke education given when appropriate.  - Importance of fall prevention discussed.   - Differential diagnosis and plan of care discussed with patient and/or family and primary team  - Thank you for allowing me to participate in the care of this patient. Call with questions.  Gregory Bonilla MD  Vascular Neurology .     
89yo lady with PMH of COPD, htn, hLd, hypothyroidism, TIA, trigeminal neuralgia, T2DM with AMS    - No signs of significant ischemia or volume overload.   - ekg w/o ischemic changes.   - HS trops neg    - Her CTA of the head and neck shows severe obstruction of the PCA  - neurosx evaluation appreciated  - she may need permissive HTN to maintain an adequate MAP in setting of cerebral arterial stenosis, though this was not specifically commented upon by neuro  - consider maintaining -150  -bp has been labile in the past 24h, such that hydralazine was administered.  bp is now controlled  - fu with neuro  - cont norvasc and losartan, which was increased  -may need to add hydralazine standing or other agent, pending clinical course  - cont plavix and statin  -noted made of suggestion that ilr may be needed, would continue to monitor for now    - she has a loud audible systolic  murmur on exam  - await 2d echo    - cont with hypothyroid rx.   - Further cardiac workup will depend on clinical course.   - All other workup per primary team. Will followup. 
This patient is an 89yo lady with PMH of COPD, htn, hLd, hypothyroidism, TIA, trigeminal neuralgia, T2DM who was BIBEMS from [facility] to the ED with confusion. The patient denies any complaints, does not know where she is and cannot recall events from earlier today. I called [facility] and spoke with care provider, Marci, who stated that the patient can ambulate and eat independently. The patient was sent to the ED due to new onset of confusion.   
88yo lady with PMH of COPD, htn, hLd, hypothyroidism, TIA, trigeminal neuralgia, T2DM with AMS    - No signs of significant ischemia or volume overload.   - ekg wihtout ischemic changes.   - HS trops neg    - Her CTA of the head and neck shows severe obstruction of the PCA  - neurosx evaluation appreciated  - she may need permissive HTN to maintain an adequate MAP in setting of cerebral arterial stenosis  - consider maintaining -150  - fu with neuro  - cont norvasc and losartan (can increase losartan to 50 bid if remains high)  - cont plavix and statin    - she has a loud audible systolic  murmur on exam  - check 2d echo    - cont with hypothyroid rx.   - Further cardiac workup will depend on clinical course.   - All other workup per primary team. Will followup.   
89 year old female with  dementia, TIA, HTN, HLD, COPD, TGN (on carbemazepine) DM has presented from an assisted facility to the ED as a code stroke at 19:25 on 11/28/21 for being lethargic since 18:30 on 11/28/21. LKW is known. She was found standing in the  and was supposedly confused as well. Upon presentation in ED, pt was completely mute and found to have some effort against gravity in both of her legs.   Pt is not a TPA candidate as LKW is unknown.   Pt is not a thrombectomy candidate as no LVO seen on imaging.   Per son, pt has been getting confused over the past year, possibly from progressive dementia. Pt denies any headache, nausea, numbness, tingling, weakness, changes in vision or hearing,     NIHSS 11 (mute, no age or month, severe dysarthria, both legs- some effort against gravity)  preMRS 3 (pt uses a walker)  Reexam NIHSS 6 (wrong month, didnt know age, legs- some effort against gravity). Pt is conversing. No aphasia or dysarthria noted.    CT head showed small focal hypodensity within left occipital lobe, concerning for new infarct.   CTA head showed severe short segment stenosis vs. subtotal occlusion of L PCA at P1/P2 junction. 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 CT perfusion showed f 99 mL penumbra of tissue at risk, predominantly located within the left PCA territory.  TSH elevated 24, B12 WNL.    A1c 5.6    TTE done   Impression: AMS may be secondary to toxic metabolic encephalopathy vs progression of neurodegenerative condition (ie dementia). Stroke on CTH is of competing mechanism; either ICAD vs ESUS.      Recommendations:  - BP elevated f/u cardio   - f/u TFTs, TSH elevated   - Dual antiplatelet therapy with ASA 81mg PO daily and Clopidogrel 75mg PO daily x 3 weeks followed by monotherapy with ASA 81mg PO daily.  - High dose statin therapy - atorvastatin 40mg PO daily. LDL goal <70mg/dL.  - MRI brain w/o if able vs CTH.    - nsx recs apprecaited. NTD  - telemetry  - likely needs ILR   - PT/OT/SS/SLP, OOBC  - BP normotensive   - check FS, glucose control <180  - GI/DVT ppx  - Counseling on diet, exercise, and medication adherence was done  - Counseling on smoking cessation and alcohol consumption offered when appropriate.  - Pain assessed and judicious use of narcotics when appropriate was discussed.    - Stroke education given when appropriate.  - Importance of fall prevention discussed.   - Differential diagnosis and plan of care discussed with patient and/or family and primary team  - Thank you for allowing me to participate in the care of this patient. Call with questions.  - family refusing DESMOND Bonilla MD  Vascular Neurology .     
89 year old female with  dementia, TIA, HTN, HLD, COPD, TGN (on carbemazepine) DM has presented from an assisted facility to the ED as a code stroke at 19:25 on 11/28/21 for being lethargic since 18:30 on 11/28/21. LKW is known. She was found standing in the  and was supposedly confused as well. Upon presentation in ED, pt was completely mute and found to have some effort against gravity in both of her legs.   Pt is not a TPA candidate as LKW is unknown.   Pt is not a thrombectomy candidate as no LVO seen on imaging.   Per son, pt has been getting confused over the past year, possibly from progressive dementia. Pt denies any headache, nausea, numbness, tingling, weakness, changes in vision or hearing,     NIHSS 11 (mute, no age or month, severe dysarthria, both legs- some effort against gravity)  preMRS 3 (pt uses a walker)  Reexam NIHSS 6 (wrong month, didnt know age, legs- some effort against gravity). Pt is conversing. No aphasia or dysarthria noted.    CT head showed small focal hypodensity within left occipital lobe, concerning for new infarct.   CTA head showed severe short segment stenosis vs. subtotal occlusion of L PCA at P1/P2 junction. 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 CT perfusion showed f 99 mL penumbra of tissue at risk, predominantly located within the left PCA territory.  TSH elevated 24, B12 WNL.   Impression: AMS may be secondary to toxic metabolic encephalopathy vs progression of neurodegenerative condition (ie dementia). Stroke on CTH is of competing mechanism; either ICAD vs ESUS.      Recommendations:  - f/u TFTs, TSH elevated   - Dual antiplatelet therapy with ASA 81mg PO daily and Clopidogrel 75mg PO daily x 3 weeks followed by monotherapy with ASA 81mg PO daily.  - High dose statin therapy - atorvastatin 40mg PO daily. LDL goal <70mg/dL.  - MRI brain w/o  - Hemoglobin A1c and lipid panel  - TTE  - nsx recs apprecaited. NTD  - telemetry  - likely needs ILR   - PT/OT/SS/SLP, OOBC  - BP normotensive   - check FS, glucose control <180  - GI/DVT ppx  - Counseling on diet, exercise, and medication adherence was done  - Counseling on smoking cessation and alcohol consumption offered when appropriate.  - Pain assessed and judicious use of narcotics when appropriate was discussed.    - Stroke education given when appropriate.  - Importance of fall prevention discussed.   - Differential diagnosis and plan of care discussed with patient and/or family and primary team  - Thank you for allowing me to participate in the care of this patient. Call with questions.  Gregory Bonilla MD  Vascular Neurology .     
89yo lady with PMH of COPD, htn, hLd, hypothyroidism, TIA, trigeminal neuralgia, T2DM with AMS    - No signs of significant ischemia or volume overload.   - ekg w/o ischemic changes.   - HS trops neg    - Her CTA of the head and neck shows severe obstruction of the PCA  - neurosx evaluation appreciated  - she may need permissive HTN to maintain an adequate MAP in setting of cerebral arterial stenosis, though this was not specifically commented upon by neuro  - consider maintaining -150  - bp has been labile in the past 24h, such that hydralazine was administered.  bp is better this morning  - fu with neuro  - cont norvasc and losartan  - add hydralazine 25 tid  - cont plavix and statin  - noted made of suggestion that ilr may be needed, would continue to monitor for now    - she has a systolic  murmur on exam and echo suggests mild-mod as    - cont with hypothyroid rx.   - Further cardiac workup will depend on clinical course.   - All other workup per primary team. Will followup. 
This patient is an 88yo lady with PMH of COPD, htn, hLd, hypothyroidism, TIA, trigeminal neuralgia, T2DM who was BIBEMS from [facility] to the ED with confusion. The patient denies any complaints, does not know where she is and cannot recall events from earlier today. I called [facility] and spoke with care provider, Marci, who stated that the patient can ambulate and eat independently. The patient was sent to the ED due to new onset of confusion.   
This patient is an 88yo lady with PMH of COPD, htn, hLd, hypothyroidism, TIA, trigeminal neuralgia, T2DM who was BIBEMS from [facility] to the ED with confusion. The patient denies any complaints, does not know where she is and cannot recall events from earlier today. I called [facility] and spoke with care provider, Marci, who stated that the patient can ambulate and eat independently. The patient was sent to the ED due to new onset of confusion.

## 2021-12-02 NOTE — DISCHARGE NOTE NURSING/CASE MANAGEMENT/SOCIAL WORK - NSDCPEFALRISK_GEN_ALL_CORE
For information on Fall & Injury Prevention, visit: [email or web address] OR  [email or web address] OR  https://www.cdc.gov/steadi/patient.html

## 2021-12-02 NOTE — DISCHARGE NOTE NURSING/CASE MANAGEMENT/SOCIAL WORK - NSDCVIVACCINE_GEN_ALL_CORE_FT
Tdap; 18-Jul-2021 14:56; Malaika Tate (RN); [employer]; [id number] (Exp. Date: 01-Oct-2022); IntraMuscular; Deltoid Right.; 0.5 milliLiter(s); VIS (VIS Published: 09-May-2013, VIS Presented: 18-Jul-2021);

## 2021-12-02 NOTE — PROGRESS NOTE ADULT - PROBLEM SELECTOR PLAN 3
Start home dose of carbamazepine 200mg TID.

## 2021-12-02 NOTE — DISCHARGE NOTE NURSING/CASE MANAGEMENT/SOCIAL WORK - PATIENT PORTAL LINK FT
You can access the FollowMyHealth Patient Portal offered by [facility] by registering at the following website: [email or web address]. By joining "[employer]’s FollowMyHealth portal, you will also be able to view your health information using other applications (apps) compatible with our system.

## 2021-12-02 NOTE — PROGRESS NOTE ADULT - PROBLEM SELECTOR PROBLEM 1
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

## 2021-12-02 NOTE — PROGRESS NOTE ADULT - PROBLEM SELECTOR PLAN 1
CT identified severe short segment stenosis versus subtotal occlusion of the left posterior cerebral artery at the P1/P2 junction. 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  CT perfusion study found predicted volume of 99 mL penumbra of tissue at risk, predominantly located within the left PCA territory.  I spoke with neurosurgery team- 1.8mm aneurysm does not require intervention.  Follow up neurology team recommendations:   - check MRI brain w/o contrast  - Patient has documented aspirin allergy. Will start plavix 75mg PO qdaily.  - check TSH, free T4, B12, folate, thiamine levels   - start fall precautions  - dysphagia screen  Will increase atorvastatin to 40 mg PO qdaily.
CT identified severe short segment stenosis versus subtotal occlusion of the left posterior cerebral artery at the P1/P2 junction. 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  CT perfusion study found predicted volume of 99 mL penumbra of tissue at risk, predominantly located within the left PCA territory.  - cont aspirin, plavix
CT identified severe short segment stenosis versus subtotal occlusion of the left posterior cerebral artery at the P1/P2 junction. 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  CT perfusion study found predicted volume of 99 mL penumbra of tissue at risk, predominantly located within the left PCA territory.  I spoke with neurosurgery team- 1.8mm aneurysm does not require intervention.  Follow up neurology team recommendations:   - check MRI brain w/o contrast  - Patient has documented aspirin allergy. Will start plavix 75mg PO qdaily.  - check TSH, free T4, B12, folate, thiamine levels   - start fall precautions  - dysphagia screen  Will increase atorvastatin to 40 mg PO qdaily.

## 2021-12-02 NOTE — PROGRESS NOTE ADULT - PROBLEM SELECTOR PLAN 2
Patient is symptomatic with SBP in 190s/100s  Pt was given losartan 25mg PO x1.   Will continue losartan 25mg PO BID (equivalent to home candesartan dose). Uptitrate if necessary.
Patient is symptomatic with SBP in 190s/100s  Pt was given losartan 25mg PO x1.   Will continue losartan 25mg PO BID (equivalent to home candesartan dose). Uptitrate if necessary.
titrate bp meds for optimal bp control

## 2021-12-03 VITALS
SYSTOLIC BLOOD PRESSURE: 119 MMHG | TEMPERATURE: 98 F | DIASTOLIC BLOOD PRESSURE: 75 MMHG | HEART RATE: 71 BPM | OXYGEN SATURATION: 99 % | RESPIRATION RATE: 18 BRPM

## 2021-12-17 ENCOUNTER — INPATIENT (INPATIENT)
Facility: HOSPITAL | Age: 86
LOS: 10 days | Discharge: HOSPICE | DRG: 682 | End: 2021-12-28
Attending: INTERNAL MEDICINE | Admitting: INTERNAL MEDICINE
Payer: MEDICARE

## 2021-12-17 VITALS
WEIGHT: 119.93 LBS | HEART RATE: 16 BPM | DIASTOLIC BLOOD PRESSURE: 83 MMHG | SYSTOLIC BLOOD PRESSURE: 82 MMHG | OXYGEN SATURATION: 95 % | TEMPERATURE: 98 F | HEIGHT: 63 IN

## 2021-12-17 DIAGNOSIS — E11.9 TYPE 2 DIABETES MELLITUS WITHOUT COMPLICATIONS: ICD-10-CM
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
## 2021-12-17 DIAGNOSIS — K59.00 CONSTIPATION, UNSPECIFIED: ICD-10-CM

## 2021-12-17 DIAGNOSIS — G45.9 TRANSIENT CEREBRAL ISCHEMIC ATTACK, UNSPECIFIED: ICD-10-CM

## 2021-12-17 DIAGNOSIS — Z02.9 ENCOUNTER FOR ADMINISTRATIVE EXAMINATIONS, UNSPECIFIED: ICD-10-CM

## 2021-12-17 DIAGNOSIS — G93.41 METABOLIC ENCEPHALOPATHY: ICD-10-CM

## 2021-12-17 DIAGNOSIS — I10 ESSENTIAL (PRIMARY) HYPERTENSION: ICD-10-CM

## 2021-12-17 PROBLEM — J44.9 CHRONIC OBSTRUCTIVE PULMONARY DISEASE, UNSPECIFIED: Chronic | Status: ACTIVE | Noted: 2021-11-29

## 2021-12-17 PROBLEM — G50.0 TRIGEMINAL NEURALGIA: Chronic | Status: ACTIVE | Noted: 2021-11-29

## 2021-12-17 PROBLEM — E03.9 HYPOTHYROIDISM, UNSPECIFIED: Chronic | Status: ACTIVE | Noted: 2021-11-29

## 2021-12-17 PROBLEM — E78.5 HYPERLIPIDEMIA, UNSPECIFIED: Chronic | Status: ACTIVE | Noted: 2021-11-29

## 2021-12-17 PROBLEM — F03.90 UNSPECIFIED DEMENTIA WITHOUT BEHAVIORAL DISTURBANCE: Chronic | Status: ACTIVE | Noted: 2021-11-29

## 2021-12-17 LAB
ALBUMIN SERPL ELPH-MCNC: 3.6 G/DL — SIGNIFICANT CHANGE UP (ref 3.3–5)
ALP SERPL-CCNC: 59 U/L — SIGNIFICANT CHANGE UP (ref 40–120)
ALT FLD-CCNC: 18 U/L — SIGNIFICANT CHANGE UP (ref 10–45)
ANION GAP SERPL CALC-SCNC: 12 MMOL/L — SIGNIFICANT CHANGE UP (ref 5–17)
ANION GAP SERPL CALC-SCNC: 13 MMOL/L — SIGNIFICANT CHANGE UP (ref 5–17)
ANISOCYTOSIS BLD QL: SLIGHT — SIGNIFICANT CHANGE UP
APPEARANCE UR: CLEAR — SIGNIFICANT CHANGE UP
AST SERPL-CCNC: 21 U/L — SIGNIFICANT CHANGE UP (ref 10–40)
BACTERIA # UR AUTO: NEGATIVE — SIGNIFICANT CHANGE UP
BASE EXCESS BLDV CALC-SCNC: -3.2 MMOL/L — LOW (ref -2–2)
BASOPHILS # BLD AUTO: 0.17 K/UL — SIGNIFICANT CHANGE UP (ref 0–0.2)
BASOPHILS NFR BLD AUTO: 0.9 % — SIGNIFICANT CHANGE UP (ref 0–2)
BILIRUB SERPL-MCNC: 0.3 MG/DL — SIGNIFICANT CHANGE UP (ref 0.2–1.2)
BILIRUB UR-MCNC: NEGATIVE — SIGNIFICANT CHANGE UP
BUN SERPL-MCNC: 60 MG/DL — HIGH (ref 7–23)
BUN SERPL-MCNC: 64 MG/DL — HIGH (ref 7–23)
BURR CELLS BLD QL SMEAR: SLIGHT — SIGNIFICANT CHANGE UP
CA-I SERPL-SCNC: 1.24 MMOL/L — SIGNIFICANT CHANGE UP (ref 1.15–1.33)
CALCIUM SERPL-MCNC: 8.3 MG/DL — LOW (ref 8.4–10.5)
CALCIUM SERPL-MCNC: 8.5 MG/DL — SIGNIFICANT CHANGE UP (ref 8.4–10.5)
CHLORIDE BLDV-SCNC: 103 MMOL/L — SIGNIFICANT CHANGE UP (ref 96–108)
CHLORIDE SERPL-SCNC: 102 MMOL/L — SIGNIFICANT CHANGE UP (ref 96–108)
CHLORIDE SERPL-SCNC: 103 MMOL/L — SIGNIFICANT CHANGE UP (ref 96–108)
CO2 BLDV-SCNC: 23 MMOL/L — SIGNIFICANT CHANGE UP (ref 22–26)
CO2 SERPL-SCNC: 18 MMOL/L — LOW (ref 22–31)
CO2 SERPL-SCNC: 20 MMOL/L — LOW (ref 22–31)
COLOR SPEC: YELLOW — SIGNIFICANT CHANGE UP
CREAT SERPL-MCNC: 0.91 MG/DL — SIGNIFICANT CHANGE UP (ref 0.5–1.3)
CREAT SERPL-MCNC: 1.07 MG/DL — SIGNIFICANT CHANGE UP (ref 0.5–1.3)
DIFF PNL FLD: NEGATIVE — SIGNIFICANT CHANGE UP
ELLIPTOCYTES BLD QL SMEAR: SLIGHT — SIGNIFICANT CHANGE UP
EOSINOPHIL # BLD AUTO: 0 K/UL — SIGNIFICANT CHANGE UP (ref 0–0.5)
EOSINOPHIL NFR BLD AUTO: 0 % — SIGNIFICANT CHANGE UP (ref 0–6)
EPI CELLS # UR: 1 /HPF — SIGNIFICANT CHANGE UP
GAS PNL BLDV: 131 MMOL/L — LOW (ref 136–145)
GAS PNL BLDV: SIGNIFICANT CHANGE UP
GAS PNL BLDV: SIGNIFICANT CHANGE UP
GLUCOSE BLDV-MCNC: 105 MG/DL — HIGH (ref 70–99)
GLUCOSE SERPL-MCNC: 110 MG/DL — HIGH (ref 70–99)
GLUCOSE SERPL-MCNC: 96 MG/DL — SIGNIFICANT CHANGE UP (ref 70–99)
GLUCOSE UR QL: NEGATIVE — SIGNIFICANT CHANGE UP
HCO3 BLDV-SCNC: 22 MMOL/L — SIGNIFICANT CHANGE UP (ref 22–29)
HCT VFR BLD CALC: 27.5 % — LOW (ref 34.5–45)
HCT VFR BLD CALC: 29.4 % — LOW (ref 34.5–45)
HCT VFR BLDA CALC: 30 % — LOW (ref 34.5–46.5)
HGB BLD CALC-MCNC: 10 G/DL — LOW (ref 11.7–16.1)
HGB BLD-MCNC: 9.2 G/DL — LOW (ref 11.5–15.5)
HGB BLD-MCNC: 9.8 G/DL — LOW (ref 11.5–15.5)
HYALINE CASTS # UR AUTO: 13 /LPF — HIGH (ref 0–2)
KETONES UR-MCNC: NEGATIVE — SIGNIFICANT CHANGE UP
LACTATE BLDV-MCNC: 2 MMOL/L — SIGNIFICANT CHANGE UP (ref 0.7–2)
LEUKOCYTE ESTERASE UR-ACNC: NEGATIVE — SIGNIFICANT CHANGE UP
LYMPHOCYTES # BLD AUTO: 0.67 K/UL — LOW (ref 1–3.3)
LYMPHOCYTES # BLD AUTO: 3.5 % — LOW (ref 13–44)
MAGNESIUM SERPL-MCNC: 2 MG/DL — SIGNIFICANT CHANGE UP (ref 1.6–2.6)
MANUAL SMEAR VERIFICATION: SIGNIFICANT CHANGE UP
MCHC RBC-ENTMCNC: 31.4 PG — SIGNIFICANT CHANGE UP (ref 27–34)
MCHC RBC-ENTMCNC: 31.8 PG — SIGNIFICANT CHANGE UP (ref 27–34)
MCHC RBC-ENTMCNC: 33.3 GM/DL — SIGNIFICANT CHANGE UP (ref 32–36)
MCHC RBC-ENTMCNC: 33.5 GM/DL — SIGNIFICANT CHANGE UP (ref 32–36)
MCV RBC AUTO: 93.9 FL — SIGNIFICANT CHANGE UP (ref 80–100)
MCV RBC AUTO: 95.5 FL — SIGNIFICANT CHANGE UP (ref 80–100)
MICROCYTES BLD QL: SLIGHT — SIGNIFICANT CHANGE UP
MONOCYTES # BLD AUTO: 0.84 K/UL — SIGNIFICANT CHANGE UP (ref 0–0.9)
MONOCYTES NFR BLD AUTO: 4.4 % — SIGNIFICANT CHANGE UP (ref 2–14)
NEUTROPHILS # BLD AUTO: 17.33 K/UL — HIGH (ref 1.8–7.4)
NEUTROPHILS NFR BLD AUTO: 87.7 % — HIGH (ref 43–77)
NEUTS BAND # BLD: 3.5 % — SIGNIFICANT CHANGE UP (ref 0–8)
NITRITE UR-MCNC: NEGATIVE — SIGNIFICANT CHANGE UP
NRBC # BLD: 0 /100 WBCS — SIGNIFICANT CHANGE UP (ref 0–0)
PCO2 BLDV: 39 MMHG — SIGNIFICANT CHANGE UP (ref 39–42)
PH BLDV: 7.36 — SIGNIFICANT CHANGE UP (ref 7.32–7.43)
PH UR: 5.5 — SIGNIFICANT CHANGE UP (ref 5–8)
PLAT MORPH BLD: NORMAL — SIGNIFICANT CHANGE UP
PLATELET # BLD AUTO: 274 K/UL — SIGNIFICANT CHANGE UP (ref 150–400)
PLATELET # BLD AUTO: 299 K/UL — SIGNIFICANT CHANGE UP (ref 150–400)
PO2 BLDV: 23 MMHG — LOW (ref 25–45)
POIKILOCYTOSIS BLD QL AUTO: SLIGHT — SIGNIFICANT CHANGE UP
POTASSIUM BLDV-SCNC: 4.2 MMOL/L — SIGNIFICANT CHANGE UP (ref 3.5–5.1)
POTASSIUM SERPL-MCNC: 3.7 MMOL/L — SIGNIFICANT CHANGE UP (ref 3.5–5.3)
POTASSIUM SERPL-MCNC: 4.3 MMOL/L — SIGNIFICANT CHANGE UP (ref 3.5–5.3)
POTASSIUM SERPL-SCNC: 3.7 MMOL/L — SIGNIFICANT CHANGE UP (ref 3.5–5.3)
POTASSIUM SERPL-SCNC: 4.3 MMOL/L — SIGNIFICANT CHANGE UP (ref 3.5–5.3)
PROT SERPL-MCNC: 5.9 G/DL — LOW (ref 6–8.3)
PROT UR-MCNC: ABNORMAL
RBC # BLD: 2.93 M/UL — LOW (ref 3.8–5.2)
RBC # BLD: 3.08 M/UL — LOW (ref 3.8–5.2)
RBC # FLD: 13.7 % — SIGNIFICANT CHANGE UP (ref 10.3–14.5)
RBC # FLD: 13.8 % — SIGNIFICANT CHANGE UP (ref 10.3–14.5)
RBC BLD AUTO: ABNORMAL
RBC CASTS # UR COMP ASSIST: 4 /HPF — SIGNIFICANT CHANGE UP (ref 0–4)
SAO2 % BLDV: 33.6 % — LOW (ref 67–88)
SARS-COV-2 RNA SPEC QL NAA+PROBE: SIGNIFICANT CHANGE UP
SODIUM SERPL-SCNC: 134 MMOL/L — LOW (ref 135–145)
SODIUM SERPL-SCNC: 134 MMOL/L — LOW (ref 135–145)
SP GR SPEC: 1.02 — SIGNIFICANT CHANGE UP (ref 1.01–1.02)
TARGETS BLD QL SMEAR: SLIGHT — SIGNIFICANT CHANGE UP
UROBILINOGEN FLD QL: ABNORMAL
WBC # BLD: 12.73 K/UL — HIGH (ref 3.8–10.5)
WBC # BLD: 19 K/UL — HIGH (ref 3.8–10.5)
WBC # FLD AUTO: 12.73 K/UL — HIGH (ref 3.8–10.5)
WBC # FLD AUTO: 19 K/UL — HIGH (ref 3.8–10.5)
WBC UR QL: 2 /HPF — SIGNIFICANT CHANGE UP (ref 0–5)

## 2021-12-17 PROCEDURE — 99223 1ST HOSP IP/OBS HIGH 75: CPT

## 2021-12-17 PROCEDURE — 70450 CT HEAD/BRAIN W/O DYE: CPT | Mod: 26,MG

## 2021-12-17 PROCEDURE — 72190 X-RAY EXAM OF PELVIS: CPT | Mod: 26

## 2021-12-17 PROCEDURE — 71045 X-RAY EXAM CHEST 1 VIEW: CPT | Mod: 26

## 2021-12-17 PROCEDURE — 99285 EMERGENCY DEPT VISIT HI MDM: CPT | Mod: 25,GC

## 2021-12-17 PROCEDURE — 72125 CT NECK SPINE W/O DYE: CPT | Mod: 26,MG

## 2021-12-17 PROCEDURE — 73562 X-RAY EXAM OF KNEE 3: CPT | Mod: 26,50

## 2021-12-17 PROCEDURE — G1004: CPT

## 2021-12-17 PROCEDURE — 74177 CT ABD & PELVIS W/CONTRAST: CPT | Mod: 26,MG

## 2021-12-17 PROCEDURE — 93010 ELECTROCARDIOGRAM REPORT: CPT | Mod: GC

## 2021-12-17 RX ORDER — ALENDRONATE SODIUM 70 MG/1
1 TABLET ORAL
Qty: 0 | Refills: 0 | DISCHARGE

## 2021-12-17 RX ORDER — SENNA PLUS 8.6 MG/1
2 TABLET ORAL AT BEDTIME
Refills: 0 | Status: DISCONTINUED | OUTPATIENT
Start: 2021-12-17 | End: 2021-12-22

## 2021-12-17 RX ORDER — ACETAMINOPHEN 500 MG
650 TABLET ORAL EVERY 6 HOURS
Refills: 0 | Status: DISCONTINUED | OUTPATIENT
Start: 2021-12-17 | End: 2021-12-25

## 2021-12-17 RX ORDER — ACETAMINOPHEN 500 MG
650 TABLET ORAL EVERY 6 HOURS
Refills: 0 | Status: DISCONTINUED | OUTPATIENT
Start: 2021-12-17 | End: 2021-12-17

## 2021-12-17 RX ORDER — CHOLECALCIFEROL (VITAMIN D3) 125 MCG
2000 CAPSULE ORAL DAILY
Refills: 0 | Status: DISCONTINUED | OUTPATIENT
Start: 2021-12-17 | End: 2021-12-22

## 2021-12-17 RX ORDER — PIPERACILLIN AND TAZOBACTAM 4; .5 G/20ML; G/20ML
3.38 INJECTION, POWDER, LYOPHILIZED, FOR SOLUTION INTRAVENOUS ONCE
Refills: 0 | Status: COMPLETED | OUTPATIENT
Start: 2021-12-17 | End: 2021-12-17

## 2021-12-17 RX ORDER — HEPARIN SODIUM 5000 [USP'U]/ML
5000 INJECTION INTRAVENOUS; SUBCUTANEOUS EVERY 12 HOURS
Refills: 0 | Status: DISCONTINUED | OUTPATIENT
Start: 2021-12-17 | End: 2021-12-18

## 2021-12-17 RX ORDER — AMLODIPINE BESYLATE 2.5 MG/1
10 TABLET ORAL DAILY
Refills: 0 | Status: DISCONTINUED | OUTPATIENT
Start: 2021-12-18 | End: 2021-12-21

## 2021-12-17 RX ORDER — LANOLIN/MINERAL OIL
1 LOTION (ML) TOPICAL
Qty: 0 | Refills: 0 | DISCHARGE

## 2021-12-17 RX ORDER — ATORVASTATIN CALCIUM 80 MG/1
40 TABLET, FILM COATED ORAL AT BEDTIME
Refills: 0 | Status: DISCONTINUED | OUTPATIENT
Start: 2021-12-17 | End: 2021-12-22

## 2021-12-17 RX ORDER — CARBAMAZEPINE 200 MG
200 TABLET ORAL THREE TIMES A DAY
Refills: 0 | Status: DISCONTINUED | OUTPATIENT
Start: 2021-12-17 | End: 2021-12-26

## 2021-12-17 RX ORDER — FERROUS SULFATE 325(65) MG
325 TABLET ORAL DAILY
Refills: 0 | Status: DISCONTINUED | OUTPATIENT
Start: 2021-12-17 | End: 2021-12-22

## 2021-12-17 RX ORDER — SODIUM CHLORIDE 9 MG/ML
1000 INJECTION, SOLUTION INTRAVENOUS
Refills: 0 | Status: DISCONTINUED | OUTPATIENT
Start: 2021-12-17 | End: 2021-12-21

## 2021-12-17 RX ORDER — SODIUM CHLORIDE 9 MG/ML
1000 INJECTION, SOLUTION INTRAVENOUS ONCE
Refills: 0 | Status: COMPLETED | OUTPATIENT
Start: 2021-12-17 | End: 2021-12-17

## 2021-12-17 RX ORDER — LANOLIN ALCOHOL/MO/W.PET/CERES
3 CREAM (GRAM) TOPICAL AT BEDTIME
Refills: 0 | Status: DISCONTINUED | OUTPATIENT
Start: 2021-12-17 | End: 2021-12-22

## 2021-12-17 RX ORDER — LOSARTAN POTASSIUM 100 MG/1
25 TABLET, FILM COATED ORAL DAILY
Refills: 0 | Status: DISCONTINUED | OUTPATIENT
Start: 2021-12-18 | End: 2021-12-22

## 2021-12-17 RX ORDER — POLYETHYLENE GLYCOL 3350 17 G/17G
17 POWDER, FOR SOLUTION ORAL
Refills: 0 | Status: DISCONTINUED | OUTPATIENT
Start: 2021-12-17 | End: 2021-12-22

## 2021-12-17 RX ORDER — LEVOTHYROXINE SODIUM 125 MCG
125 TABLET ORAL DAILY
Refills: 0 | Status: DISCONTINUED | OUTPATIENT
Start: 2021-12-17 | End: 2021-12-21

## 2021-12-17 RX ADMIN — POLYETHYLENE GLYCOL 3350 17 GRAM(S): 17 POWDER, FOR SOLUTION ORAL at 18:31

## 2021-12-17 RX ADMIN — SENNA PLUS 2 TABLET(S): 8.6 TABLET ORAL at 22:55

## 2021-12-17 RX ADMIN — Medication 200 MILLIGRAM(S): at 22:54

## 2021-12-17 RX ADMIN — HEPARIN SODIUM 5000 UNIT(S): 5000 INJECTION INTRAVENOUS; SUBCUTANEOUS at 18:31

## 2021-12-17 RX ADMIN — SODIUM CHLORIDE 1000 MILLILITER(S): 9 INJECTION, SOLUTION INTRAVENOUS at 09:08

## 2021-12-17 RX ADMIN — Medication 200 MILLIGRAM(S): at 18:47

## 2021-12-17 RX ADMIN — PIPERACILLIN AND TAZOBACTAM 200 GRAM(S): 4; .5 INJECTION, POWDER, LYOPHILIZED, FOR SOLUTION INTRAVENOUS at 09:33

## 2021-12-17 RX ADMIN — ATORVASTATIN CALCIUM 40 MILLIGRAM(S): 80 TABLET, FILM COATED ORAL at 22:55

## 2021-12-17 RX ADMIN — Medication 325 MILLIGRAM(S): at 18:30

## 2021-12-17 RX ADMIN — Medication 650 MILLIGRAM(S): at 18:29

## 2021-12-17 RX ADMIN — Medication 5 MILLIGRAM(S): at 18:29

## 2021-12-17 NOTE — H&P ADULT - NSHPSOCIALHISTORY_GEN_ALL_CORE
Denies smoking, etoh.  [facility]  On discharge from last admission - PT recommended: Subacute rehab to improve functional mobility, if d/c back to [facility] pt requires assistance/supervision with all functional activities, home PT

## 2021-12-17 NOTE — H&P ADULT - ASSESSMENT
89 y/o F PMH of COPD, HTN, HLD, hypothyroidism, TIA, trigeminal neuralgia, T2DM, and dementia [facility] with an unwitnessed fall adm with acute metabolic encephalopathy 2/2 constipation, ANGELICA, and further workup for falls.

## 2021-12-17 NOTE — H&P ADULT - PROBLEM SELECTOR PLAN 2
S/P disimpaction in the ED, no bowel obstruction seen on CT  - miralax BID  - senna bedtime  - tap water enema x 1  - bisacodyl at bedtime  - monitor abd exam

## 2021-12-17 NOTE — H&P ADULT - PROBLEM SELECTOR PLAN 3
unwitnessed fall, unclear etiology given pt's mental status  recent TTE w/ stage 1 dCHF, otherwise unremarkable  - telemetry x 24 hours  - check orthostatics

## 2021-12-17 NOTE — ED ADULT NURSE NOTE - OBJECTIVE STATEMENT
89 year old female pt presented to the ED via EMS from [facility] in [place] stating pt had an unwitnessed fall, pt is a non verbal demented pt unsure if pt can ambulate no obvious signs deformity pt randomly yells, abd soft non tender non distended lung field CTA, vss no shortness of breath no distress noted 89 year old female pt presented to the ED via EMS from [facility] in [place] stating pt had an unwitnessed fall, pt is a non verbal demented pt unsure if pt can ambulate no obvious signs deformity pt randomly yells, abd soft tender and distended lung field CTA, vss no shortness of breath no distress noted

## 2021-12-17 NOTE — ED ADULT NURSE NOTE - NSIMPLEMENTINTERV_GEN_ALL_ED
Implemented All Fall with Harm Risk Interventions:  [place] to call system. Call bell, personal items and telephone within reach. Instruct patient to call for assistance. Room bathroom lighting operational. Non-slip footwear when patient is off stretcher. Physically safe environment: no spills, clutter or unnecessary equipment. Stretcher in lowest position, wheels locked, appropriate side rails in place. Provide visual cue, wrist band, yellow gown, etc. Monitor gait and stability. Monitor for mental status changes and reorient to person, place, and time. Review medications for side effects contributing to fall risk. Reinforce activity limits and safety measures with patient and family. Provide visual clues: red socks.

## 2021-12-17 NOTE — ED PROVIDER NOTE - CARE PLAN
1 Principal Discharge DX:	Fall   Principal Discharge DX:	Acute metabolic encephalopathy  Secondary Diagnosis:	Fecal impaction of colon

## 2021-12-17 NOTE — ED PROVIDER NOTE - DATE/TIME 1
NOTIFICATION RETURN TO WORK / SCHOOL 
 
5/29/2018 4:17 PM 
 
Ms. Cora Olmedo [id number] [place] [id number] To Whom It May Concern: 
 
Cora Olmedo is currently under the care of [address] Please excuse her from work on 5/28/2018 She will return to work/school on: 5/29/2018 If there are questions or concerns please have the patient contact our office. Sincerely, Krista Chavarria NP 
 
                                
 
 17-Dec-2021 10:43

## 2021-12-17 NOTE — ED PROVIDER NOTE - ATTENDING CONTRIBUTION TO CARE
Attending MD Zhao:  I personally have seen and examined this patient. I have performed a substantive portion of the visit including all aspects of the medical decision making.  Resident note reviewed and agree on plan of care and except where noted.  See HPI, PE, and MDM for details.

## 2021-12-17 NOTE — CHART NOTE - NSCHARTNOTEFT_GEN_A_CORE
Medicine NP note    CC: In hospital unwitnessed fall  Notified by RN that patient s/p unwitnessed fall. Per RN, patient was found sitting on the floor on her knees.   Evaluated the patient at bedside. Patient alert, opens eyes spontaneously, A&ox0 which is her baseline. No change in her mental status from her baseline.   Unable to obtain subjective information as patient with dementia A&ox0    Vital Signs Last 24 Hrs  T(C): 36.7 (17 Dec 2021 21:25), Max: 36.8 (17 Dec 2021 06:56)  T(F): 98.1 (17 Dec 2021 21:25), Max: 98.3 (17 Dec 2021 06:56)  HR: 100 (17 Dec 2021 21:25) (16 - 100)  BP: 139/75 (17 Dec 2021 21:25) (82/83 - 163/85)  BP(mean): --  RR: 22 (17 Dec 2021 21:25) (16 - 22)  SpO2: 100% (17 Dec 2021 21:25) (95% - 100%)    Physical exam    CONSTITUTIONAL: Well-developed, A&ox0  EYES: No conjunctival or scleral injection, non-icteric  ENMT: No external nasal lesions; nasal mucosa not inflamed; no pharyngeal injection or exudates  RESPIRATORY: Breathing comfortably; lungs CTA without wheeze/rhonchi/rales  CARDIOVASCULAR: +S1S2, RRR, pedal pulses full and symmetric; no lower extremity edema  GASTROINTESTINAL: No palpable masses or tenderness, +distended abdomen. no rebound/guarding  MUSCULOSKELETAL: No digital clubbing or cyanosis; no paraspinal tenderness; no joint effusions of upper or lower extremities; normal strength and tone of extremities  passive range of motion at elbows, knee, and pelvis without gross pain  SKIN: +old and new ecchymoses of b/l tibia. No subcutaneous nodules or induration palpable  PSYCHIATRIC: alert and groaning  neuro: aaox0    A/P    88 y/o F PMH of COPD, HTN, HLD, hypothyroidism, TIA, trigeminal neuralgia, T2DM, and dementia who was BIBEMS from [facility] to the ED with an unwitnessed fall adm with acute metabolic encephalopathy 2/2 constipation, ANGELICA, and further workup for falls.   Acute metabolic encephalopathy. CT w/ stool impaction s/p ED manual disimpaction with some stool  infectious workup neg - UA,CXR, CTAP neg for infection  Elevated WBC - likely elevated 2/2 dehydration   suspect 2/2 dehydration /ANGELICA, constipation and delirium superimposed on dementia    patient now with In hospital unwitnessed fall    # Unwitnessed Fall of unclear etiology  No head injury noted, Mental status at baseline  Xray B/l knee, Pelvis ordered, will F/U results  Consider CTH if mental status change  Trend WBC, Blood CX, Ucx  Orthostatics  High risk fall precautions  Constant observation  Trend vitals  Frequent reorientation  C/W LR  Discussed with Attending  Will continue to monitor  Will sign out to covering provider       - Medicine NP note    CC: In hospital unwitnessed fall  Notified by RN that patient s/p unwitnessed fall. Per RN, patient was found sitting on the floor on her knees.   Evaluated the patient at bedside. Patient alert, opens eyes spontaneously, A&ox0 which is her baseline. No change in her mental status from her baseline.   Unable to obtain subjective information as patient with dementia A&ox0    Vital Signs Last 24 Hrs  T(C): 36.7 (17 Dec 2021 21:25), Max: 36.8 (17 Dec 2021 06:56)  T(F): 98.1 (17 Dec 2021 21:25), Max: 98.3 (17 Dec 2021 06:56)  HR: 100 (17 Dec 2021 21:25) (16 - 100)  BP: 139/75 (17 Dec 2021 21:25) (82/83 - 163/85)  BP(mean): --  RR: 22 (17 Dec 2021 21:25) (16 - 22)  SpO2: 100% (17 Dec 2021 21:25) (95% - 100%)    < from: CT Head No Cont (12.17.21 @ 09:16) >    Brain CT: No change since 11/20/2021. Atrophy and small vessel white   matter ischemic changes. No hemorrhage.    Cervical spine CT: No acute fractures. Degenerative changes.    < end of copied text >    Physical exam    CONSTITUTIONAL: Well-developed, A&ox0  EYES: No conjunctival or scleral injection, non-icteric  ENMT: No external nasal lesions; nasal mucosa not inflamed; no pharyngeal injection or exudates  RESPIRATORY: Breathing comfortably; lungs CTA without wheeze/rhonchi/rales  CARDIOVASCULAR: +S1S2, RRR, pedal pulses full and symmetric; no lower extremity edema  GASTROINTESTINAL: No palpable masses or tenderness, +distended abdomen. no rebound/guarding  MUSCULOSKELETAL: No digital clubbing or cyanosis; no paraspinal tenderness; no joint effusions of upper or lower extremities; normal strength and tone of extremities  passive range of motion at elbows, knee, and pelvis without gross pain  SKIN: +old and new ecchymoses of b/l tibia. No subcutaneous nodules or induration palpable  PSYCHIATRIC: alert, not follows verbal commands  neuro: aaox0    A/P    88 y/o F PMH of COPD, HTN, HLD, hypothyroidism, TIA, trigeminal neuralgia, T2DM, and dementia who [facility] with an unwitnessed fall adm with acute metabolic encephalopathy 2/2 constipation, ANGELICA, and further workup for falls.   Acute metabolic encephalopathy. CT w/ stool impaction s/p ED manual disimpaction with some stool  infectious workup neg - UA,CXR, CTAP neg for infection  Elevated WBC - likely elevated 2/2 dehydration   suspect 2/2 dehydration /ANGELICA, constipation and delirium superimposed on dementia    patient now with In hospital unwitnessed fall    # Unwitnessed Fall of unclear etiology  No head injury noted, Mental status at baseline  Xray B/l knee, Pelvis ordered, will F/U results  Consider CTH if mental status change  Trend WBC, Blood CX, Ucx  Orthostatics  High risk fall precautions  Constant observation  Trend vitals  Frequent reorientation  C/W LR  Discussed with Attending  Will continue to monitor  Will sign out to covering provider       - Medicine NP note    CC: In hospital unwitnessed fall  Notified by RN that patient s/p unwitnessed fall. Per RN, patient was found sitting on the floor on her knees.   Evaluated the patient at bedside. Patient alert, opens eyes spontaneously, A&ox0 which is her baseline. No change in her mental status from her baseline.   Unable to obtain subjective information as patient with dementia A&ox0    Vital Signs Last 24 Hrs  T(C): 36.7 (17 Dec 2021 21:25), Max: 36.8 (17 Dec 2021 06:56)  T(F): 98.1 (17 Dec 2021 21:25), Max: 98.3 (17 Dec 2021 06:56)  HR: 100 (17 Dec 2021 21:25) (16 - 100)  BP: 139/75 (17 Dec 2021 21:25) (82/83 - 163/85)  BP(mean): --  RR: 22 (17 Dec 2021 21:25) (16 - 22)  SpO2: 100% (17 Dec 2021 21:25) (95% - 100%)    < from: CT Head No Cont (12.17.21 @ 09:16) >    Brain CT: No change since 11/20/2021. Atrophy and small vessel white   matter ischemic changes. No hemorrhage.    Cervical spine CT: No acute fractures. Degenerative changes.    < end of copied text >    Physical exam    CONSTITUTIONAL: Well-developed, A&ox0  EYES: No conjunctival or scleral injection, non-icteric  ENMT: No external nasal lesions; nasal mucosa not inflamed; no pharyngeal injection or exudates  RESPIRATORY: Breathing comfortably; lungs CTA without wheeze/rhonchi/rales  CARDIOVASCULAR: +S1S2, RRR, pedal pulses full and symmetric; no lower extremity edema  GASTROINTESTINAL: No palpable masses or tenderness, +distended abdomen. no rebound/guarding  MUSCULOSKELETAL: No digital clubbing or cyanosis; no paraspinal tenderness; no joint effusions of upper or lower extremities; normal strength and tone of extremities  passive range of motion at elbows, knee, and pelvis without gross pain  SKIN: +old and new ecchymoses of b/l tibia. No subcutaneous nodules or induration palpable  PSYCHIATRIC: alert, not follows verbal commands  neuro: aaox0    A/P    88 y/o F PMH of COPD, HTN, HLD, hypothyroidism, TIA, trigeminal neuralgia, T2DM, and dementia who [facility] with an unwitnessed fall adm with acute metabolic encephalopathy 2/2 constipation, ANGELICA, and further workup for falls.   Acute metabolic encephalopathy. CT w/ stool impaction s/p ED manual disimpaction with some stool  infectious workup neg - UA,CXR, CTAP neg for infection  Elevated WBC - likely elevated 2/2 dehydration   suspect 2/2 dehydration /ANGELICA, constipation and delirium superimposed on dementia    patient now with In hospital unwitnessed fall    # Unwitnessed Fall of unclear etiology  No head injury noted, Mental status at baseline  Xray B/l knee, Pelvis ordered, will F/U results  Consider CTH if mental status change/clinical status declining  Trend WBC, Blood CX, Ucx  Orthostatics  High risk fall precautions  Constant observation  Trend vitals and labs  Frequent reorientation  C/W LR  Discussed with Attending  Will continue to monitor  Will sign out to covering provider     Get sampson [facility]  [id number]  - Medicine NP note    CC: In hospital unwitnessed fall  Notified by RN that patient s/p unwitnessed fall. Per RN, patient was found sitting on the floor on her knees.   Evaluated the patient at bedside. Patient alert, opens eyes spontaneously, A&ox0 which is her baseline. No change in her mental status from her baseline.   Unable to obtain subjective information as patient with dementia A&ox0    Vital Signs Last 24 Hrs  T(C): 36.7 (17 Dec 2021 21:25), Max: 36.8 (17 Dec 2021 06:56)  T(F): 98.1 (17 Dec 2021 21:25), Max: 98.3 (17 Dec 2021 06:56)  HR: 100 (17 Dec 2021 21:25) (16 - 100)  BP: 139/75 (17 Dec 2021 21:25) (82/83 - 163/85)  BP(mean): --  RR: 22 (17 Dec 2021 21:25) (16 - 22)  SpO2: 100% (17 Dec 2021 21:25) (95% - 100%)    < from: CT Head No Cont (12.17.21 @ 09:16) >    Brain CT: No change since 11/20/2021. Atrophy and small vessel white   matter ischemic changes. No hemorrhage.    Cervical spine CT: No acute fractures. Degenerative changes.    < end of copied text >    Physical exam    CONSTITUTIONAL: Well-developed, A&ox0  EYES: No conjunctival or scleral injection, non-icteric  ENMT: No external nasal lesions; nasal mucosa not inflamed; no pharyngeal injection or exudates  RESPIRATORY: Breathing comfortably; lungs CTA without wheeze/rhonchi/rales  CARDIOVASCULAR: +S1S2, RRR, pedal pulses full and symmetric; no lower extremity edema  GASTROINTESTINAL: No palpable masses or tenderness, +distended abdomen. no rebound/guarding  MUSCULOSKELETAL: No digital clubbing or cyanosis; no paraspinal tenderness; no joint effusions of upper or lower extremities; normal strength and tone of extremities  passive range of motion at elbows, knee, and pelvis without gross pain  SKIN: +old and new ecchymoses of b/l tibia. No subcutaneous nodules or induration palpable  PSYCHIATRIC: alert, not follows verbal commands  neuro: aaox0    A/P    88 y/o F PMH of COPD, HTN, HLD, hypothyroidism, TIA, trigeminal neuralgia, T2DM, and dementia who [facility] with an unwitnessed fall adm with acute metabolic encephalopathy 2/2 constipation, ANGELICA, and further workup for falls.   Acute metabolic encephalopathy. CT w/ stool impaction s/p ED manual disimpaction with some stool  infectious workup neg - UA,CXR, CTAP neg for infection  Elevated WBC - likely elevated 2/2 dehydration   suspect 2/2 dehydration /ANGELICA, constipation and delirium superimposed on dementia    patient now with In hospital unwitnessed fall    # Unwitnessed Fall of unclear etiology  No head injury noted, Mental status at baseline  Moving all extremities  Abdomen soft, benign, no c/o abdominal pain  Patient s/p CTH and CT A/P on admission, unremarkable  Xray B/l knee, Pelvis ordered, will F/U results  Consider CTH if mental status change/clinical status declining  Trend WBC, Blood CX, Ucx  Orthostatics  High risk fall precautions  Constant observation  Trend vitals and labs  Frequent reorientation  C/W LR  Discussed with Attending  Will continue to monitor  Will sign out to covering provider     Get sampson [facility] [place]  [phone]  - Medicine NP note    CC: In hospital unwitnessed fall  Notified by RN that patient s/p unwitnessed fall. Per RN, patient was found sitting on the floor on her knees.   Evaluated the patient at bedside. Patient alert, opens eyes spontaneously, A&ox0 which is her baseline. No change in her mental status from her baseline.   Unable to obtain subjective information as patient with dementia A&ox0    Vital Signs Last 24 Hrs  T(C): 36.7 (17 Dec 2021 21:25), Max: 36.8 (17 Dec 2021 06:56)  T(F): 98.1 (17 Dec 2021 21:25), Max: 98.3 (17 Dec 2021 06:56)  HR: 100 (17 Dec 2021 21:25) (16 - 100)  BP: 139/75 (17 Dec 2021 21:25) (82/83 - 163/85)  BP(mean): --  RR: 22 (17 Dec 2021 21:25) (16 - 22)  SpO2: 100% (17 Dec 2021 21:25) (95% - 100%)    < from: CT Head No Cont (12.17.21 @ 09:16) >    Brain CT: No change since 11/20/2021. Atrophy and small vessel white   matter ischemic changes. No hemorrhage.    Cervical spine CT: No acute fractures. Degenerative changes.    < end of copied text >    Physical exam    CONSTITUTIONAL: Well-developed, A&ox0  EYES: No conjunctival or scleral injection, non-icteric  ENMT: No external nasal lesions; nasal mucosa not inflamed; no pharyngeal injection or exudates  RESPIRATORY: Breathing comfortably; lungs CTA without wheeze/rhonchi/rales  CARDIOVASCULAR: +S1S2, RRR, pedal pulses full and symmetric; no lower extremity edema  GASTROINTESTINAL: No palpable masses or tenderness, +distended abdomen. no rebound/guarding  MUSCULOSKELETAL: No digital clubbing or cyanosis; no paraspinal tenderness; no joint effusions of upper or lower extremities; normal strength and tone of extremities  passive range of motion at elbows, knee, and pelvis without gross pain  SKIN: +old and new ecchymoses of b/l tibia. No subcutaneous nodules or induration palpable  PSYCHIATRIC: alert, not follows verbal commands  neuro: aaox0    A/P    88 y/o F PMH of COPD, HTN, HLD, hypothyroidism, TIA, trigeminal neuralgia, T2DM, and dementia who [facility] with an unwitnessed fall adm with acute metabolic encephalopathy 2/2 constipation, ANGELICA, and further workup for falls.   Acute metabolic encephalopathy. CT w/ stool impaction s/p ED manual disimpaction with some stool  infectious workup neg - UA,CXR, CTAP neg for infection  Elevated WBC - likely elevated 2/2 dehydration   suspect 2/2 dehydration /ANGELICA, constipation and delirium superimposed on dementia    patient now with In hospital unwitnessed fall    # Unwitnessed Fall of unclear etiology  No head injury noted, Mental status at baseline  Moving all extremities  Abdomen soft, benign, no c/o abdominal pain  Patient s/p CTH and CT A/P on admission, unremarkable  Xray B/l knee, Pelvis ordered, will F/U results  Consider CTH if mental status change/clinical status declining  Further diagnostic imaging bsed on patient symptoms  Trend WBC, Blood CX, Ucx  Orthostatics  High risk fall precautions  Constant observation  Trend vitals and labs  Frequent reorientation  C/W LR  Discussed with Attending  Will continue to monitor  Will sign out to covering provider     Get sampson [facility] [place]  [id number]  -

## 2021-12-17 NOTE — H&P ADULT - PROBLEM SELECTOR PLAN 4
- HOLDING asa/plavix given acute drop in hgb 9.8 from 12  - repeat stat cbc  - if hgb remains stable tomorrow, can resume asa and plavix if no signs of bleeding

## 2021-12-17 NOTE — H&P ADULT - NSHPLABSRESULTS_GEN_ALL_CORE
LABS:                         9.8    19.00 )-----------( 299      ( 17 Dec 2021 07:51 )             29.4     12-17    134<L>  |  103  |  64<H>  ----------------------------<  110<H>  4.3   |  18<L>  |  1.07    Ca    8.5      17 Dec 2021 07:51    TPro  5.9<L>  /  Alb  3.6  /  TBili  0.3  /  DBili  x   /  AST  21  /  ALT  18  /  AlkPhos  59  12-      Urinalysis Basic - ( 17 Dec 2021 07:59 )    Color: Yellow / Appearance: Clear / S.023 / pH: x  Gluc: x / Ketone: Negative  / Bili: Negative / Urobili: 2 mg/dL   Blood: x / Protein: 30 mg/dL / Nitrite: Negative   Leuk Esterase: Negative / RBC: 4 /hpf / WBC 2 /HPF   Sq Epi: x / Non Sq Epi: 1 /hpf / Bacteria: Negative      Records reviewed from prior hospitalization - as described in HPI  EKG personally reviewed , NSR, PVCs+  CXR personally reviewed aortic calcification, no effusion or infiltration noted

## 2021-12-17 NOTE — ED PROVIDER NOTE - PHYSICAL EXAMINATION
GENERAL: frail appearing in no acute distress  HEAD: normocephalic, atraumatic  HEENT: normal conjunctiva, oral mucosa moist, uvula midline, no tonsilar exudates, neck supple, no JVD  CARDIAC: regular rate and rhythm, normal S1S2, no appreciable murmurs, 2+ pulses in UE/LE b/l  PULM: normal breath sounds, clear to ascultation bilaterally, no rales, rhonchi, wheezing  GI: abdomen distended, no guarding, no rebound tenderness  : no CVA tenderness b/l, no suprapubic tenderness  NEURO: PERRLA, A&Ox0, unable to conduct further neuro testing  MSK: no peripheral edema, no calf tenderness b/l  SKIN: extremities warm, no visible rashes  PSYCH: Unable to conduct

## 2021-12-17 NOTE — ED PROVIDER NOTE - CLINICAL SUMMARY MEDICAL DECISION MAKING FREE TEXT BOX
Attending MD Zhao: 89F with dementia, COPD presenting from [facility] facility for unwitnessed fall. No history available from patient due to dementia, exam notable for no evident trauma externally, no focal bony ttp, no chest wall, abdomen is distended but no focal ttp. Given paucity of history, will obtain pan CT scan to r/o occult trauma, labs to r/o metabolic derangements, ECG, re-evaluate       *The above represents an initial assessment/impression. Please refer to progress notes for potential changes in patient clinical course*

## 2021-12-17 NOTE — H&P ADULT - NSHPPHYSICALEXAM_GEN_ALL_CORE
Vital Signs Last 24 Hrs  T(C): 36.6 (17 Dec 2021 07:05), Max: 36.8 (17 Dec 2021 06:56)  T(F): 97.8 (17 Dec 2021 07:05), Max: 98.3 (17 Dec 2021 06:56)  HR: 74 (17 Dec 2021 11:11) (16 - 94)  BP: 121/77 (17 Dec 2021 11:11) (82/83 - 153/84)  BP(mean): --  RR: 16 (17 Dec 2021 11:11) (16 - 18)  SpO2: 95% (17 Dec 2021 11:11) (95% - 98%)    CONSTITUTIONAL: Well-developed, elderly f, groaning  EYES: No conjunctival or scleral injection, non-icteric  ENMT: No external nasal lesions; nasal mucosa not inflamed; no pharyngeal injection or exudates  RESPIRATORY: Breathing comfortably; lungs CTA without wheeze/rhonchi/rales  CARDIOVASCULAR: +S1S2, RRR, pedal pulses full and symmetric; no lower extremity edema  GASTROINTESTINAL: No palpable masses or tenderness, +distended abdomen. no rebound/guarding  MUSCULOSKELETAL: No digital clubbing or cyanosis; no paraspinal tenderness; no joint effusions of upper or lower extremities; normal strength and tone of extremities  passive range of motion at elbows, knee, and pelvis without gross pain  SKIN: +old and new ecchymoses of b/l tibia. No subcutaneous nodules or induration palpable  PSYCHIATRIC: alert and groaning  neuro: aaox0

## 2021-12-17 NOTE — H&P ADULT - PROBLEM SELECTOR PLAN 5
HX of HTN urgency last admission  on admission hypotensive to 80s  - resume bp meds tomorrow with hold parameters - on losartan 25 mg po daily (lowered then BID dosing of candesartan at home)  - norvasc 10 mg daily  - hold hydralazine

## 2021-12-17 NOTE — H&P ADULT - PROBLEM SELECTOR PLAN 1
suspect 2/2 dehydration/ANGELICA, constipation and delerium superimposed on dementia  CT w/ stool impaction s/p ED manual disimpaction with some stool  infectious workup neg - UA,CXR, CTAP neg for infection  - management of constipation as below  - IVF hydration LR x 20 hrs given likely prerenal ANGELICA  - Trend WBC - likely elevated 2/2 dehydration  - monitor mental status  - frequent reorientation  - minimize use of antipsychotics  - melatonin PRN at bedtime

## 2021-12-17 NOTE — ED PROVIDER NOTE - OBJECTIVE STATEMENT
This patient is an 89 y/o female with PMH of COPD, htn, hLd, hypothyroidism, TIA, trigeminal neuralgia, T2DM, and dementia who was BIBEMS from [facility] to the ED with an unwitnessed fall. EMS stated that the patient took her first morning dose of BP meds and did not take any other meds this morning. Pt is otherwise moaning at her baseline and is unresponsive to questions.

## 2021-12-17 NOTE — H&P ADULT - HISTORY OF PRESENT ILLNESS
89 y/o F PMH of COPD, HTN, HLD, hypothyroidism, TIA, trigeminal neuralgia, T2DM, and dementia who [facility] with an unwitnessed [facility]. Reached out to [facility] for a callback for additional information. History obtained per chart documentation. Unable to obtain history given pt's mental status. Per EMS report, pt had received her home BP medications.  She was recently admitted at [facility] and discharged on 12/2 admitted then for AMS, found on imaging to have occlusions/stenosis of L posterior cerebral a, seen by neurology. She was noted to have HTN urgency and medications titrated.    In the ED, intially hypotensive SBP 80s on admission, s/p 1 L IVF with improvement in SBP to 120s. Afebrile.  Medications received: zosyn iv, LR 1L

## 2021-12-18 LAB
ANION GAP SERPL CALC-SCNC: 12 MMOL/L — SIGNIFICANT CHANGE UP (ref 5–17)
ANION GAP SERPL CALC-SCNC: 13 MMOL/L — SIGNIFICANT CHANGE UP (ref 5–17)
BLD GP AB SCN SERPL QL: NEGATIVE — SIGNIFICANT CHANGE UP
BUN SERPL-MCNC: 57 MG/DL — HIGH (ref 7–23)
BUN SERPL-MCNC: 74 MG/DL — HIGH (ref 7–23)
CALCIUM SERPL-MCNC: 7.9 MG/DL — LOW (ref 8.4–10.5)
CALCIUM SERPL-MCNC: 8.3 MG/DL — LOW (ref 8.4–10.5)
CHLORIDE SERPL-SCNC: 107 MMOL/L — SIGNIFICANT CHANGE UP (ref 96–108)
CHLORIDE SERPL-SCNC: 109 MMOL/L — HIGH (ref 96–108)
CO2 SERPL-SCNC: 21 MMOL/L — LOW (ref 22–31)
CO2 SERPL-SCNC: 21 MMOL/L — LOW (ref 22–31)
CREAT SERPL-MCNC: 0.76 MG/DL — SIGNIFICANT CHANGE UP (ref 0.5–1.3)
CREAT SERPL-MCNC: 0.83 MG/DL — SIGNIFICANT CHANGE UP (ref 0.5–1.3)
CULTURE RESULTS: NO GROWTH — SIGNIFICANT CHANGE UP
FOLATE SERPL-MCNC: 14.1 NG/ML — SIGNIFICANT CHANGE UP
GLUCOSE SERPL-MCNC: 113 MG/DL — HIGH (ref 70–99)
GLUCOSE SERPL-MCNC: 120 MG/DL — HIGH (ref 70–99)
HCT VFR BLD CALC: 20.7 % — CRITICAL LOW (ref 34.5–45)
HCT VFR BLD CALC: 20.7 % — CRITICAL LOW (ref 34.5–45)
HCT VFR BLD CALC: 21 % — CRITICAL LOW (ref 34.5–45)
HCT VFR BLD CALC: 28.8 % — LOW (ref 34.5–45)
HGB BLD-MCNC: 6.9 G/DL — CRITICAL LOW (ref 11.5–15.5)
HGB BLD-MCNC: 9.3 G/DL — LOW (ref 11.5–15.5)
IRON SATN MFR SERPL: 45 % — SIGNIFICANT CHANGE UP (ref 14–50)
IRON SATN MFR SERPL: 94 UG/DL — SIGNIFICANT CHANGE UP (ref 30–160)
MCHC RBC-ENTMCNC: 28.7 PG — SIGNIFICANT CHANGE UP (ref 27–34)
MCHC RBC-ENTMCNC: 31.4 PG — SIGNIFICANT CHANGE UP (ref 27–34)
MCHC RBC-ENTMCNC: 31.5 PG — SIGNIFICANT CHANGE UP (ref 27–34)
MCHC RBC-ENTMCNC: 31.7 PG — SIGNIFICANT CHANGE UP (ref 27–34)
MCHC RBC-ENTMCNC: 32.3 GM/DL — SIGNIFICANT CHANGE UP (ref 32–36)
MCHC RBC-ENTMCNC: 32.9 GM/DL — SIGNIFICANT CHANGE UP (ref 32–36)
MCHC RBC-ENTMCNC: 33.3 GM/DL — SIGNIFICANT CHANGE UP (ref 32–36)
MCHC RBC-ENTMCNC: 33.3 GM/DL — SIGNIFICANT CHANGE UP (ref 32–36)
MCV RBC AUTO: 88.9 FL — SIGNIFICANT CHANGE UP (ref 80–100)
MCV RBC AUTO: 94.5 FL — SIGNIFICANT CHANGE UP (ref 80–100)
MCV RBC AUTO: 95 FL — SIGNIFICANT CHANGE UP (ref 80–100)
MCV RBC AUTO: 95.5 FL — SIGNIFICANT CHANGE UP (ref 80–100)
NRBC # BLD: 0 /100 WBCS — SIGNIFICANT CHANGE UP (ref 0–0)
PLATELET # BLD AUTO: 192 K/UL — SIGNIFICANT CHANGE UP (ref 150–400)
PLATELET # BLD AUTO: 226 K/UL — SIGNIFICANT CHANGE UP (ref 150–400)
PLATELET # BLD AUTO: 231 K/UL — SIGNIFICANT CHANGE UP (ref 150–400)
PLATELET # BLD AUTO: 236 K/UL — SIGNIFICANT CHANGE UP (ref 150–400)
POTASSIUM SERPL-MCNC: 3.2 MMOL/L — LOW (ref 3.5–5.3)
POTASSIUM SERPL-MCNC: 3.6 MMOL/L — SIGNIFICANT CHANGE UP (ref 3.5–5.3)
POTASSIUM SERPL-SCNC: 3.2 MMOL/L — LOW (ref 3.5–5.3)
POTASSIUM SERPL-SCNC: 3.6 MMOL/L — SIGNIFICANT CHANGE UP (ref 3.5–5.3)
RBC # BLD: 2.18 M/UL — LOW (ref 3.8–5.2)
RBC # BLD: 2.19 M/UL — LOW (ref 3.8–5.2)
RBC # BLD: 2.2 M/UL — LOW (ref 3.8–5.2)
RBC # BLD: 3.24 M/UL — LOW (ref 3.8–5.2)
RBC # FLD: 13.6 % — SIGNIFICANT CHANGE UP (ref 10.3–14.5)
RBC # FLD: 13.7 % — SIGNIFICANT CHANGE UP (ref 10.3–14.5)
RBC # FLD: 13.9 % — SIGNIFICANT CHANGE UP (ref 10.3–14.5)
RBC # FLD: 18.6 % — HIGH (ref 10.3–14.5)
RH IG SCN BLD-IMP: NEGATIVE — SIGNIFICANT CHANGE UP
SODIUM SERPL-SCNC: 140 MMOL/L — SIGNIFICANT CHANGE UP (ref 135–145)
SODIUM SERPL-SCNC: 143 MMOL/L — SIGNIFICANT CHANGE UP (ref 135–145)
SPECIMEN SOURCE: SIGNIFICANT CHANGE UP
TIBC SERPL-MCNC: 206 UG/DL — LOW (ref 220–430)
UIBC SERPL-MCNC: 112 UG/DL — SIGNIFICANT CHANGE UP (ref 110–370)
VIT B12 SERPL-MCNC: 653 PG/ML — SIGNIFICANT CHANGE UP (ref 232–1245)
WBC # BLD: 8.31 K/UL — SIGNIFICANT CHANGE UP (ref 3.8–10.5)
WBC # BLD: 9.24 K/UL — SIGNIFICANT CHANGE UP (ref 3.8–10.5)
WBC # BLD: 9.29 K/UL — SIGNIFICANT CHANGE UP (ref 3.8–10.5)
WBC # BLD: 9.61 K/UL — SIGNIFICANT CHANGE UP (ref 3.8–10.5)
WBC # FLD AUTO: 8.31 K/UL — SIGNIFICANT CHANGE UP (ref 3.8–10.5)
WBC # FLD AUTO: 9.24 K/UL — SIGNIFICANT CHANGE UP (ref 3.8–10.5)
WBC # FLD AUTO: 9.29 K/UL — SIGNIFICANT CHANGE UP (ref 3.8–10.5)
WBC # FLD AUTO: 9.61 K/UL — SIGNIFICANT CHANGE UP (ref 3.8–10.5)

## 2021-12-18 PROCEDURE — 99223 1ST HOSP IP/OBS HIGH 75: CPT

## 2021-12-18 PROCEDURE — 70450 CT HEAD/BRAIN W/O DYE: CPT | Mod: 26

## 2021-12-18 PROCEDURE — 74177 CT ABD & PELVIS W/CONTRAST: CPT | Mod: 26

## 2021-12-18 PROCEDURE — 71260 CT THORAX DX C+: CPT | Mod: 26

## 2021-12-18 RX ADMIN — HEPARIN SODIUM 5000 UNIT(S): 5000 INJECTION INTRAVENOUS; SUBCUTANEOUS at 05:39

## 2021-12-18 RX ADMIN — Medication 650 MILLIGRAM(S): at 14:09

## 2021-12-18 RX ADMIN — Medication 650 MILLIGRAM(S): at 21:00

## 2021-12-18 RX ADMIN — ATORVASTATIN CALCIUM 40 MILLIGRAM(S): 80 TABLET, FILM COATED ORAL at 22:54

## 2021-12-18 RX ADMIN — Medication 5 MILLIGRAM(S): at 14:09

## 2021-12-18 RX ADMIN — Medication 200 MILLIGRAM(S): at 14:09

## 2021-12-18 RX ADMIN — SENNA PLUS 2 TABLET(S): 8.6 TABLET ORAL at 22:55

## 2021-12-18 RX ADMIN — Medication 200 MILLIGRAM(S): at 22:54

## 2021-12-18 RX ADMIN — POLYETHYLENE GLYCOL 3350 17 GRAM(S): 17 POWDER, FOR SOLUTION ORAL at 05:39

## 2021-12-18 RX ADMIN — Medication 125 MICROGRAM(S): at 05:38

## 2021-12-18 RX ADMIN — Medication 650 MILLIGRAM(S): at 14:03

## 2021-12-18 RX ADMIN — Medication 325 MILLIGRAM(S): at 14:04

## 2021-12-18 RX ADMIN — LOSARTAN POTASSIUM 25 MILLIGRAM(S): 100 TABLET, FILM COATED ORAL at 14:09

## 2021-12-18 RX ADMIN — Medication 650 MILLIGRAM(S): at 20:01

## 2021-12-18 RX ADMIN — AMLODIPINE BESYLATE 10 MILLIGRAM(S): 2.5 TABLET ORAL at 05:38

## 2021-12-18 RX ADMIN — Medication 650 MILLIGRAM(S): at 05:38

## 2021-12-18 RX ADMIN — Medication 2000 UNIT(S): at 14:31

## 2021-12-18 RX ADMIN — Medication 1 TABLET(S): at 14:09

## 2021-12-18 RX ADMIN — Medication 200 MILLIGRAM(S): at 05:38

## 2021-12-18 NOTE — PATIENT PROFILE ADULT - FALL HARM RISK - HARM RISK INTERVENTIONS
Assistance OOB with selected safe patient handling equipment/Communicate Risk of Fall with Harm to all staff/Monitor for mental status changes/Move patient closer to nurses' station/Reinforce activity limits and safety measures with patient and family/Reorient to person, place and time as needed/Tailored Fall Risk Interventions/Toileting schedule using arm’s reach rule for commode and bathroom/Use of alarms - bed, chair and/or voice tab/Visual Cue: Yellow wristband and red socks/Bed in lowest position, wheels locked, appropriate side rails in place/Call bell, personal items and telephone in reach/Instruct patient to call for assistance before getting out of bed or chair/Non-slip footwear when patient is out of bed/[place] to call system/Physically safe environment - no spills, clutter or unnecessary equipment/Purposeful Proactive Rounding/Room/bathroom lighting operational, light cord in reach

## 2021-12-18 NOTE — CONSULT NOTE ADULT - ASSESSMENT
89-year-old female admitted with an unwitnessed fall found to be severely anemic.  She has evidence of mild to moderate aortic stenosis by echocardiography but no evidence for an acute coronary syndrome at present.  Her blood pressure had been low, now normalized.    Recommend    Continue close observation and disposition planning    Continue antihypertensive therapy    Continue statin therapy    Anemia evaluation    Conservative management is warranted at this time with no need for additional cardiac testing.

## 2021-12-18 NOTE — CONSULT NOTE ADULT - SUBJECTIVE AND OBJECTIVE BOX
[facility] Consultation    CHIEF COMPLAINT: Patient is a 89y old  Female who presents with a chief complaint of unwitnessed fall (17 Dec 2021 12:47)      HPI:  88 y/o F PMH of COPD, HTN, HLD, hypothyroidism, TIA, trigeminal neuralgia, T2DM, and dementia who was BIBEMS from [facility] to the ED with an unwitnessed fall. Reached out to [facility] for a callback for additional information. History obtained per chart documentation. Unable to obtain history given pt's mental status. Per EMS report, pt had received her home BP medications.  She was recently admitted at [facility] and discharged on 12/2 admitted then for AMS, found on imaging to have occlusions/stenosis of L posterior cerebral a, seen by neurology. She was noted to have HTN urgency and medications titrated.    In the ED, intially hypotensive SBP 80s on admission, s/p 1 L IVF with improvement in SBP to 120s. Afebrile.  Medications received: zosyn iv, LR 1L (17 Dec 2021 12:47)      PAST MEDICAL & SURGICAL HISTORY:  Hypertension    Transient ischemic attack (TIA)    Chronic obstructive pulmonary disease (COPD)    Dementia    T2DM (type 2 diabetes mellitus)    HLD (hyperlipidemia)    Hypothyroidism    Trigeminal neuralgia    No significant past surgical history        SOCIAL HISTORY: no tob/etoh    FAMILY HISTORY: No CAD      MEDICATIONS  (STANDING):  acetaminophen     Tablet .. 650 milliGRAM(s) Oral every 6 hours  amLODIPine   Tablet 10 milliGRAM(s) Oral daily  atorvastatin 40 milliGRAM(s) Oral at bedtime  bisacodyl 5 milliGRAM(s) Oral daily  carBAMazepine 200 milliGRAM(s) Oral three times a day  cholecalciferol 2000 Unit(s) Oral daily  ferrous    sulfate 325 milliGRAM(s) Oral daily  lactated ringers. 1000 milliLiter(s) (50 mL/Hr) IV Continuous <Continuous>  levothyroxine 125 MICROGram(s) Oral daily  losartan 25 milliGRAM(s) Oral daily  multivitamin 1 Tablet(s) Oral daily  polyethylene glycol 3350 17 Gram(s) Oral two times a day  senna 2 Tablet(s) Oral at bedtime    MEDICATIONS  (PRN):  melatonin 3 milliGRAM(s) Oral at bedtime PRN Insomnia      Allergies    Ambien (Unknown)  aspirin (Unknown)  sulfa drugs (Unknown)    REVIEW OF SYSTEMS: unobtainable    VITAL SIGNS:   Vital Signs Last 24 Hrs  T(C): 36.6 (18 Dec 2021 12:23), Max: 36.8 (17 Dec 2021 22:26)  T(F): 97.8 (18 Dec 2021 12:23), Max: 98.3 (17 Dec 2021 22:26)  HR: 89 (18 Dec 2021 12:23) (80 - 105)  BP: 147/80 (18 Dec 2021 12:23) (104/46 - 148/76)  BP(mean): --  RR: 24 (18 Dec 2021 12:23) (20 - 24)  SpO2: 100% (18 Dec 2021 12:23) (95% - 100%)      PHYSICAL EXAM:    Constitutional: NAD, frail  Eyes:   Pupils round, no lesions  ENMT: no exudate or erythema  Pulmonary:  breath sounds are clear bilaterally, No wheezing, rales or rhonchi  Cardiovascular: PMI not palpable non-displaced Regular S1 and S2, no murmurs, rubs, gallops or clicks  Gastrointestinal: Bowel Sounds present, soft, nontender.   Lymph: No peripheral edema. No cervical lymphadenopathy.  Neurological no focal deficits  Skin: No rashes. Changes of chronic venous stasis. No cyanosis.  Psych:  cannot assess    LABS: All Labs Reviewed:                        6.9    8.31  )-----------( 226      ( 18 Dec 2021 10:09 )             21.0                         6.9    9.61  )-----------( 236      ( 18 Dec 2021 08:17 )             20.7                         6.9    9.29  )-----------( 231      ( 18 Dec 2021 06:29 )             20.7     18 Dec 2021 10:09    140    |  107    |  74     ----------------------------<  120    3.6     |  21     |  0.83   17 Dec 2021 14:07    134    |  102    |  60     ----------------------------<  96     3.7     |  20     |  0.91   17 Dec 2021 07:51    134    |  103    |  64     ----------------------------<  110    4.3     |  18     |  1.07     Ca    7.9        18 Dec 2021 10:09  Ca    8.3        17 Dec 2021 14:07  Ca    8.5        17 Dec 2021 07:51  Mg     2.0       17 Dec 2021 14:07    TPro  5.9    /  Alb  3.6    /  TBili  0.3    /  DBili  x      /  AST  21     /  ALT  18     /  AlkPhos  59     17 Dec 2021 07:51      < from: 12 Lead ECG (12.17.21 @ 07:33) >    Ventricular Rate 90 BPM    Atrial Rate 90 BPM    P-R Interval 158 ms    QRS Duration 102 ms    Q-T Interval 394 ms    QTC Calculation(Bazett) 481 ms    P Axis 78 degrees    R Axis 69 degrees    T Axis 47 degrees    Diagnosis Line SINUS RHYTHM WITH OCCASIONAL PREMATURE VENTRICULAR COMPLEXES  OTHERWISE NORMAL ECG  WHEN COMPARED WITH ECG OF 28-NOV-2021 20:11,  NO SIGNIFICANT CHANGE WAS FOUND  Confirmed by MD CHEKO, ILEANA ([id number]) on 12/17/2021 12:44:25 PM    < end of copied text >    < from: CT Chest w/ IV Cont (12.18.21 @ 13:11) >    ACC: [id number] EXAM:  CT ABDOMEN AND PELVIS IC                        ACC: [id number] EXAM:  CT CHEST IC                          PROCEDURE DATE:  12/18/2021          INTERPRETATION:  CLINICAL INFORMATION: Status post fall. Anemia.    COMPARISON: Contrast enhanced CT of the abdomen and pelvis performed one   day earlier, noncontrast CT of the thorax dated December 24, 2020.    CONTRAST/COMPLICATIONS:  IV Contrast: Omnipaque 350 (accession [id number]), IV contrast documented   in associated exam (accession [id number])  90 cc administered   10 cc   discarded  Oral Contrast: NONE  Complications: None reported at time of study completion    PROCEDURE:  CT of the Chest, Abdomen and Pelvis was performed.  Precontrast and delayed portal venous phase imaging was performed through   the abdomen and pelvis.  Sagittal and coronal reformats were performed.    FINDINGS:  CHEST:  LUNGS AND LARGE AIRWAYS: Patent central airways. Stable trace right   apical scarring. Trace subpleural atelectasis in the bilateral lower   lobes. No new pulmonary nodule or confluent airspace opacity is   identified.  PLEURA: No pleural effusion.  VESSELS: The right internal jugular vein is atretic. The thoracic aorta   and great vessels are unremarkable.  HEART: Heart size is normal. No pericardial effusion.  MEDIASTINUM AND SIL: No lymphadenopathy. There is mild concentric wall   thickening of the esophagus. There is a moderate sliding hiatal hernia.  CHEST WALL AND LOWER NECK: Within normal limits.    ABDOMEN AND PELVIS:  LIVER: Within normal limits.  BILE DUCTS: Normal caliber.  GALLBLADDER: Within normal limits.  SPLEEN: Within normal limits.  PANCREAS: Within normal limits.  ADRENALS: Within normal limits.  KIDNEYS/URETERS: Small bilateral renal cysts. 5 x 2 mmnonobstructing   right renal midpole stone. Otherwise, within normal limits.    BLADDER: The bladder is distended with excreted contrast and smooth in   contour.  REPRODUCTIVE ORGANS:    BOWEL: Again is noted large stool in the sigmoid colon and rectum. There   is no evidence of bowel obstruction. There is no stranding of the   mesenteric fat. The appendix is unremarkable. There is no mesenteric   adenopathy. There is no evidence of active intraluminal extravasation of   contrast.  PERITONEUM: Noascites. There is no evidence of hyperattenuating   collection within the peritoneal cavity or retroperitoneum to suggest   acute hemorrhage.  VESSELS: Extensive atherosclerotic disease of the nonaneurysmal abdominal   aorta and iliac arteries.  RETROPERITONEUM/LYMPH NODES: No lymphadenopathy.  ABDOMINAL WALL: Within normal limits.  BONES: Moderate degenerative changes of the bilateral shoulders. Mild   anterolisthesis of L4 on L5. Healed sternal fracture.    IMPRESSION: No evidence of acute hemorrhage in the thorax, abdomen, and   pelvis. No evidence of malignancy in the thorax, abdomen, and pelvis.   Mild diffuse esophageal thickening with moderate hiatal hernia, which may   represent reflux esophagitis. Large stool in the sigmoid colon andrectum.    --- End of Report ---            JORGE ALCARAZ MD; Attending Radiologist  This document has been electronically signed. Dec 18 2021  1:50PM    < end of copied text >    < from: Transthoracic Echocardiogram (12.01.21 @ 18:56) >    Patient name: ALEX VILLALPANDO  YOB: 1932   Age: 89 (F)   MR#: [id number]  Study Date: 12/1/2021  Location: [address] [place], [place] [id number]: Danica Abarca RDCS  Study quality: Technically difficult/Limited  Referring Physician: Darvin Márquez MD  Blood Pressure: 180/96 mmHg  Height: 160 cm  Weight: 64 kg  BSA: 1.7 m2  ------------------------------------------------------------------------  PROCEDURE: Transthoracic echocardiogram with 2-D, M-Mode  and complete spectral and color flow Doppler.  INDICATION: Abnormal electrocardiogram (ECG) (EKG) (R94.31)  ------------------------------------------------------------------------  Dimensions:    Normal Values:  LA:     3.8    2.0 - 4.0 cm  Ao:     3.1    2.0 - 3.8 cm  SEPTUM: 1.1    0.6- 1.2 cm  PWT:    1.0    0.6 - 1.1 cm  LVIDd:  3.6    3.0 - 5.6 cm  LVIDs:  2.2    1.8 - 4.0 cm  Derived variables:  LVMI: 69 g/m2  RWT: 0.55  Fractional short: 39 %  EF (Visual Estimate): 60 %  Doppler Peak Velocity (m/sec): AoV=2.0  ------------------------------------------------------------------------  Observations:  Mitral Valve: Mitral annular calcification.  Aortic Valve/Aorta: Calcified aortic valve. Incomplete  doppler interrogation precludes accurate assessment of NEEMA;  the valve appears at least mild to moderately stenotic.  Peak left ventricular outflow tract gradient equals 9 mm  Hg, mean gradient is equal to 4 mm Hg, LVOT velocity time  integral equals 25 cm.  Aortic Root: 3.1 cm.  LVOT diameter: 1.9 cm.  Left Atrium: Normal left atrium.  LA volume index = 22  cc/m2.  Left Ventricle: Mild concentric left ventricular  hypertrophy. Mild diastolic dysfunction (Stage I).  Right Heart: Normal right atrium. Normal right ventricular  size and function. Normal tricuspid valve. Mildtricuspid  regurgitation. Normal pulmonic valve.  Pericardium/Pleura: Normal pericardium with no pericardial  effusion.  Hemodynamic: Estimated right atrial pressure is 8 mm Hg.  Estimated right ventricular systolic pressure equals 39 mm  Hg, assuming right atrial pressure equals 8 mm Hg,  consistent with borderline pulmonary hypertension.  ------------------------------------------------------------------------  Conclusions:  1. Calcified aortic valve. Incomplete doppler interrogation  precludesaccurate assessment of NEEMA; the valve appears at  least mild to moderately stenotic.  2. Mild diastolic dysfunction (Stage I).  3. Normal right ventricular size and function.  *** No previous Echo exam.  ------------------------------------------------------------------------  Confirmed on  12/1/2021 - 21:23:51 by Autumn Lopes M.D.  ------------------------------------------------------------------------    < end of copied text >

## 2021-12-18 NOTE — PROGRESS NOTE ADULT - SUBJECTIVE AND OBJECTIVE BOX
SUBJECTIVE / OVERNIGHT EVENTS:pt seen and examined  21     MEDICATIONS  (STANDING):  acetaminophen     Tablet .. 650 milliGRAM(s) Oral every 6 hours  amLODIPine   Tablet 10 milliGRAM(s) Oral daily  atorvastatin 40 milliGRAM(s) Oral at bedtime  bisacodyl 5 milliGRAM(s) Oral daily  carBAMazepine 200 milliGRAM(s) Oral three times a day  cholecalciferol 2000 Unit(s) Oral daily  ferrous    sulfate 325 milliGRAM(s) Oral daily  lactated ringers. 1000 milliLiter(s) (50 mL/Hr) IV Continuous <Continuous>  levothyroxine 125 MICROGram(s) Oral daily  losartan 25 milliGRAM(s) Oral daily  multivitamin 1 Tablet(s) Oral daily  polyethylene glycol 3350 17 Gram(s) Oral two times a day  senna 2 Tablet(s) Oral at bedtime    MEDICATIONS  (PRN):  melatonin 3 milliGRAM(s) Oral at bedtime PRN Insomnia    T(C): 36.6 (21 @ 21:45), Max: 37 (21 @ 16:05)  HR: 84 (21 @ 21:45) (80 - 105)  BP: 178/82 (21 @ 21:45) (104/46 - 184/90)  RR: 18 (21 @ 21:45) (17 - 24)  SpO2: 98% (21 @ 21:45) (95% - 100%)    CAPILLARY BLOOD GLUCOSE        I&O's Summary      PHYSICAL EXAM:  GENERAL: NAD  EYES: EOMI, PERRLA  NECK: Supple, No JVD  CHEST/LUNG: dec breath sounds at bases  HEART:  S1 , S2 +  ABDOMEN: soft  , bs+  EXTREMITIES:  trace edema  NEUROLOGY:alert awake      LABS:                        6.9    8.31  )-----------( 226      ( 18 Dec 2021 10:09 )             21.0     12-    140  |  107  |  74<H>  ----------------------------<  120<H>  3.6   |  21<L>  |  0.83    Ca    7.9<L>      18 Dec 2021 10:09  Mg     2.0     -    TPro  5.9<L>  /  Alb  3.6  /  TBili  0.3  /  DBili  x   /  AST  21  /  ALT  18  /  AlkPhos  59  12-17          Urinalysis Basic - ( 17 Dec 2021 07:59 )    Color: Yellow / Appearance: Clear / S.023 / pH: x  Gluc: x / Ketone: Negative  / Bili: Negative / Urobili: 2 mg/dL   Blood: x / Protein: 30 mg/dL / Nitrite: Negative   Leuk Esterase: Negative / RBC: 4 /hpf / WBC 2 /HPF   Sq Epi: x / Non Sq Epi: 1 /hpf / Bacteria: Negative        RADIOLOGY & ADDITIONAL TESTS:    Imaging Personally Reviewed:    Consultant(s) Notes Reviewed:      Care Discussed with Consultants/Other Providers:

## 2021-12-18 NOTE — CHART NOTE - NSCHARTNOTEFT_GEN_A_CORE
Patients HCP is her son [employer].  He had a "MOLST" on his phone from the nursing facility where patient lives.  However, its does not seem complete.  He signed a MOLST, and it has been placed in the paper chart.  DNR/DNI has been ordered.

## 2021-12-19 LAB
ANION GAP SERPL CALC-SCNC: 13 MMOL/L — SIGNIFICANT CHANGE UP (ref 5–17)
BUN SERPL-MCNC: 48 MG/DL — HIGH (ref 7–23)
CALCIUM SERPL-MCNC: 8.2 MG/DL — LOW (ref 8.4–10.5)
CHLORIDE SERPL-SCNC: 111 MMOL/L — HIGH (ref 96–108)
CO2 SERPL-SCNC: 19 MMOL/L — LOW (ref 22–31)
CREAT SERPL-MCNC: 0.65 MG/DL — SIGNIFICANT CHANGE UP (ref 0.5–1.3)
GLUCOSE BLDC GLUCOMTR-MCNC: 114 MG/DL — HIGH (ref 70–99)
GLUCOSE BLDC GLUCOMTR-MCNC: 120 MG/DL — HIGH (ref 70–99)
GLUCOSE BLDC GLUCOMTR-MCNC: 127 MG/DL — HIGH (ref 70–99)
GLUCOSE BLDC GLUCOMTR-MCNC: 128 MG/DL — HIGH (ref 70–99)
GLUCOSE BLDC GLUCOMTR-MCNC: 129 MG/DL — HIGH (ref 70–99)
GLUCOSE SERPL-MCNC: 101 MG/DL — HIGH (ref 70–99)
HCT VFR BLD CALC: 29.9 % — LOW (ref 34.5–45)
HGB BLD-MCNC: 9.8 G/DL — LOW (ref 11.5–15.5)
MCHC RBC-ENTMCNC: 29.4 PG — SIGNIFICANT CHANGE UP (ref 27–34)
MCHC RBC-ENTMCNC: 32.8 GM/DL — SIGNIFICANT CHANGE UP (ref 32–36)
MCV RBC AUTO: 89.8 FL — SIGNIFICANT CHANGE UP (ref 80–100)
NRBC # BLD: 0 /100 WBCS — SIGNIFICANT CHANGE UP (ref 0–0)
PLATELET # BLD AUTO: 210 K/UL — SIGNIFICANT CHANGE UP (ref 150–400)
POTASSIUM SERPL-MCNC: 3.8 MMOL/L — SIGNIFICANT CHANGE UP (ref 3.5–5.3)
POTASSIUM SERPL-SCNC: 3.8 MMOL/L — SIGNIFICANT CHANGE UP (ref 3.5–5.3)
RBC # BLD: 3.33 M/UL — LOW (ref 3.8–5.2)
RBC # FLD: 19.3 % — HIGH (ref 10.3–14.5)
SODIUM SERPL-SCNC: 143 MMOL/L — SIGNIFICANT CHANGE UP (ref 135–145)
WBC # BLD: 12.77 K/UL — HIGH (ref 3.8–10.5)
WBC # FLD AUTO: 12.77 K/UL — HIGH (ref 3.8–10.5)

## 2021-12-19 RX ORDER — POTASSIUM CHLORIDE 20 MEQ
40 PACKET (EA) ORAL ONCE
Refills: 0 | Status: COMPLETED | OUTPATIENT
Start: 2021-12-19 | End: 2021-12-19

## 2021-12-19 RX ORDER — POTASSIUM CHLORIDE 20 MEQ
10 PACKET (EA) ORAL
Refills: 0 | Status: COMPLETED | OUTPATIENT
Start: 2021-12-19 | End: 2021-12-19

## 2021-12-19 RX ADMIN — Medication 5 MILLIGRAM(S): at 12:55

## 2021-12-19 RX ADMIN — Medication 325 MILLIGRAM(S): at 12:56

## 2021-12-19 RX ADMIN — SENNA PLUS 2 TABLET(S): 8.6 TABLET ORAL at 21:56

## 2021-12-19 RX ADMIN — POLYETHYLENE GLYCOL 3350 17 GRAM(S): 17 POWDER, FOR SOLUTION ORAL at 17:09

## 2021-12-19 RX ADMIN — ATORVASTATIN CALCIUM 40 MILLIGRAM(S): 80 TABLET, FILM COATED ORAL at 21:56

## 2021-12-19 RX ADMIN — AMLODIPINE BESYLATE 10 MILLIGRAM(S): 2.5 TABLET ORAL at 06:22

## 2021-12-19 RX ADMIN — Medication 650 MILLIGRAM(S): at 02:12

## 2021-12-19 RX ADMIN — Medication 200 MILLIGRAM(S): at 17:08

## 2021-12-19 RX ADMIN — Medication 125 MICROGRAM(S): at 06:22

## 2021-12-19 RX ADMIN — POLYETHYLENE GLYCOL 3350 17 GRAM(S): 17 POWDER, FOR SOLUTION ORAL at 06:22

## 2021-12-19 RX ADMIN — Medication 100 MILLIEQUIVALENT(S): at 02:13

## 2021-12-19 RX ADMIN — Medication 2000 UNIT(S): at 12:56

## 2021-12-19 RX ADMIN — Medication 200 MILLIGRAM(S): at 21:56

## 2021-12-19 RX ADMIN — Medication 100 MILLIEQUIVALENT(S): at 01:11

## 2021-12-19 RX ADMIN — Medication 200 MILLIGRAM(S): at 06:40

## 2021-12-19 RX ADMIN — LOSARTAN POTASSIUM 25 MILLIGRAM(S): 100 TABLET, FILM COATED ORAL at 06:22

## 2021-12-19 RX ADMIN — Medication 650 MILLIGRAM(S): at 17:09

## 2021-12-19 RX ADMIN — Medication 100 MILLIEQUIVALENT(S): at 03:48

## 2021-12-19 RX ADMIN — Medication 1 TABLET(S): at 12:55

## 2021-12-19 RX ADMIN — Medication 3 MILLIGRAM(S): at 21:56

## 2021-12-19 NOTE — PHYSICAL THERAPY INITIAL EVALUATION ADULT - PERTINENT HX OF CURRENT PROBLEM, REHAB EVAL
89 y/o F PMH of COPD, HTN, HLD, hypothyroidism, TIA, trigeminal neuralgia, T2DM, and dementia who was BIBEMS from [facility] to the ED with an unwitnessed fall adm with acute metabolic encephalopathy 2/2 constipation, ANGELICA, and further workup for falls. CTH/Cspine: No acute pathology, Xray pelvis: No acute fracture or dislocation. Xray b/l Knees: No acute fracture or dislocation. Chronic impacted left subcapital femoral neck fracture.

## 2021-12-19 NOTE — PROGRESS NOTE ADULT - SUBJECTIVE AND OBJECTIVE BOX
SUBJECTIVE / OVERNIGHT EVENTS:pt seen and examined  21     MEDICATIONS  (STANDING):  acetaminophen     Tablet .. 650 milliGRAM(s) Oral every 6 hours  amLODIPine   Tablet 10 milliGRAM(s) Oral daily  atorvastatin 40 milliGRAM(s) Oral at bedtime  bisacodyl 5 milliGRAM(s) Oral daily  carBAMazepine 200 milliGRAM(s) Oral three times a day  cholecalciferol 2000 Unit(s) Oral daily  ferrous    sulfate 325 milliGRAM(s) Oral daily  lactated ringers. 1000 milliLiter(s) (50 mL/Hr) IV Continuous <Continuous>  levothyroxine 125 MICROGram(s) Oral daily  losartan 25 milliGRAM(s) Oral daily  multivitamin 1 Tablet(s) Oral daily  polyethylene glycol 3350 17 Gram(s) Oral two times a day  senna 2 Tablet(s) Oral at bedtime    MEDICATIONS  (PRN):  melatonin 3 milliGRAM(s) Oral at bedtime PRN Insomnia    T(C): 36.6 (21 @ 21:45), Max: 37 (21 @ 16:05)  HR: 84 (21 @ 21:45) (80 - 105)  BP: 178/82 (21 @ 21:45) (104/46 - 184/90)  RR: 18 (21 @ 21:45) (17 - 24)  SpO2: 98% (21 @ 21:45) (95% - 100%)    CAPILLARY BLOOD GLUCOSE        I&O's Summary      PHYSICAL EXAM:  GENERAL: NAD  EYES: EOMI, PERRLA  NECK: Supple, No JVD  CHEST/LUNG: dec breath sounds at bases  HEART:  S1 , S2 +  ABDOMEN: soft  , bs+  EXTREMITIES:  trace edema  NEUROLOGY:alert awake      LABS:                        6.9    8.31  )-----------( 226      ( 18 Dec 2021 10:09 )             21.0     12-    140  |  107  |  74<H>  ----------------------------<  120<H>  3.6   |  21<L>  |  0.83    Ca    7.9<L>      18 Dec 2021 10:09  Mg     2.0     12-    TPro  5.9<L>  /  Alb  3.6  /  TBili  0.3  /  DBili  x   /  AST  21  /  ALT  18  /  AlkPhos  59  12-17          Urinalysis Basic - ( 17 Dec 2021 07:59 )    Color: Yellow / Appearance: Clear / S.023 / pH: x  Gluc: x / Ketone: Negative  / Bili: Negative / Urobili: 2 mg/dL   Blood: x / Protein: 30 mg/dL / Nitrite: Negative   Leuk Esterase: Negative / RBC: 4 /hpf / WBC 2 /HPF   Sq Epi: x / Non Sq Epi: 1 /hpf / Bacteria: Negative        RADIOLOGY & ADDITIONAL TESTS:    Imaging Personally Reviewed:    Consultant(s) Notes Reviewed:      Care Discussed with Consultants/Other Providers:

## 2021-12-19 NOTE — PHYSICAL THERAPY INITIAL EVALUATION ADULT - DISCHARGE DISPOSITION, PT EVAL
Sub Acute Rehab. if she returns to [facility]. Pt will need assistance with mobility/ADls. Recommend PT at [facility] to improve strength, balance, gait functions for safe return to her PLOF. Pt has RW and w/c at [facility]/rehabilitation facility

## 2021-12-19 NOTE — PHYSICAL THERAPY INITIAL EVALUATION ADULT - ADDITIONAL COMMENTS
As per the family, Pt lives in an [facility], Aides assisted her with mobility/ADls. Pt has been using w/c for mobility lately. Pt was able to ambulate using a RW until last month.

## 2021-12-20 LAB
ALBUMIN SERPL ELPH-MCNC: 3.6 G/DL — SIGNIFICANT CHANGE UP (ref 3.3–5)
ALP SERPL-CCNC: 62 U/L — SIGNIFICANT CHANGE UP (ref 40–120)
ALT FLD-CCNC: 18 U/L — SIGNIFICANT CHANGE UP (ref 10–45)
ANION GAP SERPL CALC-SCNC: 13 MMOL/L — SIGNIFICANT CHANGE UP (ref 5–17)
AST SERPL-CCNC: 29 U/L — SIGNIFICANT CHANGE UP (ref 10–40)
BASOPHILS # BLD AUTO: 0.1 K/UL — SIGNIFICANT CHANGE UP (ref 0–0.2)
BASOPHILS NFR BLD AUTO: 0.9 % — SIGNIFICANT CHANGE UP (ref 0–2)
BILIRUB SERPL-MCNC: 0.3 MG/DL — SIGNIFICANT CHANGE UP (ref 0.2–1.2)
BUN SERPL-MCNC: 35 MG/DL — HIGH (ref 7–23)
CA-I BLD-SCNC: 1.21 MMOL/L — SIGNIFICANT CHANGE UP (ref 1.15–1.33)
CALCIUM SERPL-MCNC: 9 MG/DL — SIGNIFICANT CHANGE UP (ref 8.4–10.5)
CHLORIDE SERPL-SCNC: 112 MMOL/L — HIGH (ref 96–108)
CK SERPL-CCNC: 106 U/L — SIGNIFICANT CHANGE UP (ref 25–170)
CO2 SERPL-SCNC: 21 MMOL/L — LOW (ref 22–31)
CREAT SERPL-MCNC: 0.61 MG/DL — SIGNIFICANT CHANGE UP (ref 0.5–1.3)
EOSINOPHIL # BLD AUTO: 0.16 K/UL — SIGNIFICANT CHANGE UP (ref 0–0.5)
EOSINOPHIL NFR BLD AUTO: 1.5 % — SIGNIFICANT CHANGE UP (ref 0–6)
GLUCOSE SERPL-MCNC: 106 MG/DL — HIGH (ref 70–99)
HCT VFR BLD CALC: 30.8 % — LOW (ref 34.5–45)
HGB BLD-MCNC: 9.9 G/DL — LOW (ref 11.5–15.5)
IMM GRANULOCYTES NFR BLD AUTO: 0.5 % — SIGNIFICANT CHANGE UP (ref 0–1.5)
LYMPHOCYTES # BLD AUTO: 1.66 K/UL — SIGNIFICANT CHANGE UP (ref 1–3.3)
LYMPHOCYTES # BLD AUTO: 15.1 % — SIGNIFICANT CHANGE UP (ref 13–44)
MAGNESIUM SERPL-MCNC: 2.2 MG/DL — SIGNIFICANT CHANGE UP (ref 1.6–2.6)
MCHC RBC-ENTMCNC: 28.9 PG — SIGNIFICANT CHANGE UP (ref 27–34)
MCHC RBC-ENTMCNC: 32.1 GM/DL — SIGNIFICANT CHANGE UP (ref 32–36)
MCV RBC AUTO: 89.8 FL — SIGNIFICANT CHANGE UP (ref 80–100)
MONOCYTES # BLD AUTO: 0.82 K/UL — SIGNIFICANT CHANGE UP (ref 0–0.9)
MONOCYTES NFR BLD AUTO: 7.4 % — SIGNIFICANT CHANGE UP (ref 2–14)
NEUTROPHILS # BLD AUTO: 8.23 K/UL — HIGH (ref 1.8–7.4)
NEUTROPHILS NFR BLD AUTO: 74.6 % — SIGNIFICANT CHANGE UP (ref 43–77)
NRBC # BLD: 0 /100 WBCS — SIGNIFICANT CHANGE UP (ref 0–0)
PHOSPHATE SERPL-MCNC: 3.8 MG/DL — SIGNIFICANT CHANGE UP (ref 2.5–4.5)
PLATELET # BLD AUTO: 204 K/UL — SIGNIFICANT CHANGE UP (ref 150–400)
POTASSIUM SERPL-MCNC: 3.9 MMOL/L — SIGNIFICANT CHANGE UP (ref 3.5–5.3)
POTASSIUM SERPL-SCNC: 3.9 MMOL/L — SIGNIFICANT CHANGE UP (ref 3.5–5.3)
PROT SERPL-MCNC: 5.7 G/DL — LOW (ref 6–8.3)
RBC # BLD: 3.43 M/UL — LOW (ref 3.8–5.2)
RBC # FLD: 18.9 % — HIGH (ref 10.3–14.5)
SODIUM SERPL-SCNC: 146 MMOL/L — HIGH (ref 135–145)
WBC # BLD: 11.02 K/UL — HIGH (ref 3.8–10.5)
WBC # FLD AUTO: 11.02 K/UL — HIGH (ref 3.8–10.5)

## 2021-12-20 PROCEDURE — 99232 SBSQ HOSP IP/OBS MODERATE 35: CPT

## 2021-12-20 PROCEDURE — 74018 RADEX ABDOMEN 1 VIEW: CPT | Mod: 26

## 2021-12-20 RX ORDER — HEPARIN SODIUM 5000 [USP'U]/ML
5000 INJECTION INTRAVENOUS; SUBCUTANEOUS EVERY 12 HOURS
Refills: 0 | Status: DISCONTINUED | OUTPATIENT
Start: 2021-12-20 | End: 2021-12-22

## 2021-12-20 RX ADMIN — Medication 1 ENEMA: at 20:46

## 2021-12-20 RX ADMIN — Medication 650 MILLIGRAM(S): at 06:03

## 2021-12-20 RX ADMIN — Medication 10 MILLIGRAM(S): at 15:41

## 2021-12-20 RX ADMIN — LOSARTAN POTASSIUM 25 MILLIGRAM(S): 100 TABLET, FILM COATED ORAL at 06:04

## 2021-12-20 RX ADMIN — Medication 650 MILLIGRAM(S): at 00:55

## 2021-12-20 RX ADMIN — Medication 125 MICROGRAM(S): at 06:03

## 2021-12-20 RX ADMIN — AMLODIPINE BESYLATE 10 MILLIGRAM(S): 2.5 TABLET ORAL at 06:04

## 2021-12-20 RX ADMIN — Medication 650 MILLIGRAM(S): at 01:40

## 2021-12-20 RX ADMIN — POLYETHYLENE GLYCOL 3350 17 GRAM(S): 17 POWDER, FOR SOLUTION ORAL at 06:03

## 2021-12-20 RX ADMIN — HEPARIN SODIUM 5000 UNIT(S): 5000 INJECTION INTRAVENOUS; SUBCUTANEOUS at 21:53

## 2021-12-20 RX ADMIN — Medication 200 MILLIGRAM(S): at 06:04

## 2021-12-20 NOTE — PROVIDER CONTACT NOTE (OTHER) - SITUATION
patient does note take PO medications despite multiple times of attempt. does not eat. patient would not open her mouth for PO intakes.
Pt admitted for fall

## 2021-12-20 NOTE — PROGRESS NOTE ADULT - SUBJECTIVE AND OBJECTIVE BOX
[facility] - Vicente Estevez, Lidya, Shimon, Marlena, Fransico, Tracey  Office Number:  [phone]    Patient resting comfortably in bed in NAD. Sleeping comfortably. Has 1:1.    ROS: negative unless otherwise mentioned.    Telemetry:  off    MEDICATIONS  (STANDING):  acetaminophen     Tablet .. 650 milliGRAM(s) Oral every 6 hours  amLODIPine   Tablet 10 milliGRAM(s) Oral daily  atorvastatin 40 milliGRAM(s) Oral at bedtime  bisacodyl 5 milliGRAM(s) Oral daily  carBAMazepine 200 milliGRAM(s) Oral three times a day  cholecalciferol 2000 Unit(s) Oral daily  ferrous    sulfate 325 milliGRAM(s) Oral daily  lactated ringers. 1000 milliLiter(s) (50 mL/Hr) IV Continuous <Continuous>  levothyroxine 125 MICROGram(s) Oral daily  losartan 25 milliGRAM(s) Oral daily  multivitamin 1 Tablet(s) Oral daily  polyethylene glycol 3350 17 Gram(s) Oral two times a day  senna 2 Tablet(s) Oral at bedtime    MEDICATIONS  (PRN):  melatonin 3 milliGRAM(s) Oral at bedtime PRN Insomnia      Allergies    Ambien (Unknown)  aspirin (Unknown)  sulfa drugs (Unknown)    Intolerances        Vital Signs Last 24 Hrs  T(C): 36.4 (20 Dec 2021 06:13), Max: 36.7 (19 Dec 2021 14:00)  T(F): 97.6 (20 Dec 2021 06:13), Max: 98 (19 Dec 2021 14:00)  HR: 80 (20 Dec 2021 06:13) (67 - 95)  BP: 178/74 (20 Dec 2021 06:13) (153/60 - 178/74)  BP(mean): --  RR: 18 (20 Dec 2021 06:13) (18 - 18)  SpO2: 96% (20 Dec 2021 06:13) (96% - 97%)    I&O's Summary    19 Dec 2021 07:01  -  20 Dec 2021 07:00  --------------------------------------------------------  IN: 60 mL / OUT: 900 mL / NET: -840 mL        ON EXAM:    Constitutional: NAD, frail  Eyes:   Pupils round, no lesions  ENMT: no exudate or erythema  Pulmonary:  breath sounds are clear bilaterally, No wheezing, rales or rhonchi  Cardiovascular: PMI not palpable non-displaced Regular S1 and S2, no murmurs, rubs, gallops or clicks  Gastrointestinal: Bowel Sounds present, soft, nontender.   Lymph: No peripheral edema. No cervical lymphadenopathy.  Neurological no focal deficits  Skin: No rashes. Changes of chronic venous stasis. No cyanosis.  Psych:  cannot assess    LABS: All Labs Reviewed:                        9.9    11.02 )-----------( 204      ( 20 Dec 2021 07:07 )             30.8                         9.8    12.77 )-----------( 210      ( 19 Dec 2021 07:30 )             29.9                         9.3    9.24  )-----------( 192      ( 18 Dec 2021 22:42 )             28.8     20 Dec 2021 07:02    146    |  112    |  35     ----------------------------<  106    3.9     |  21     |  0.61   19 Dec 2021 07:31    143    |  111    |  48     ----------------------------<  101    3.8     |  19     |  0.65   18 Dec 2021 22:42    143    |  109    |  57     ----------------------------<  113    3.2     |  21     |  0.76     Ca    9.0        20 Dec 2021 07:02  Ca    8.2        19 Dec 2021 07:31  Ca    8.3        18 Dec 2021 22:42  Phos  3.8       20 Dec 2021 07:02  Mg     2.2       20 Dec 2021 07:02  Mg     2.0       17 Dec 2021 14:07    TPro  5.7    /  Alb  3.6    /  TBili  0.3    /  DBili  x      /  AST  29     /  ALT  18     /  AlkPhos  62     20 Dec 2021 07:02      CARDIAC MARKERS ( 20 Dec 2021 07:02 )  x     / x     / 106 U/L / x     / x          Blood Culture: Organism --  Gram Stain Blood -- Gram Stain --  Specimen Source .Blood Blood-Peripheral  Culture-Blood --    Organism --  Gram Stain Blood -- Gram Stain --  Specimen Source Clean Catch Clean Catch (Midstream)  Culture-Blood --

## 2021-12-20 NOTE — PROVIDER CONTACT NOTE (OTHER) - ASSESSMENT
Pt A&Ox0, bladder scan shows 430, manual bp after straight cath shows 178/74.  Amlodipine & losartan given as ordered. Pt A&Ox0, bladder scan shows 430, manual bp after straight cath shows 178/74.  Amlodipine & losartan given as ordered. Abdomen is distended.

## 2021-12-21 DIAGNOSIS — Z71.89 OTHER SPECIFIED COUNSELING: ICD-10-CM

## 2021-12-21 DIAGNOSIS — F03.90 UNSPECIFIED DEMENTIA WITHOUT BEHAVIORAL DISTURBANCE: ICD-10-CM

## 2021-12-21 DIAGNOSIS — Z51.5 ENCOUNTER FOR PALLIATIVE CARE: ICD-10-CM

## 2021-12-21 LAB
ANION GAP SERPL CALC-SCNC: 14 MMOL/L — SIGNIFICANT CHANGE UP (ref 5–17)
ANION GAP SERPL CALC-SCNC: 15 MMOL/L — SIGNIFICANT CHANGE UP (ref 5–17)
BUN SERPL-MCNC: 23 MG/DL — SIGNIFICANT CHANGE UP (ref 7–23)
BUN SERPL-MCNC: 26 MG/DL — HIGH (ref 7–23)
BUN SERPL-MCNC: 26 MG/DL — HIGH (ref 7–23)
BUN SERPL-MCNC: 27 MG/DL — HIGH (ref 7–23)
CALCIUM SERPL-MCNC: 7.9 MG/DL — LOW (ref 8.4–10.5)
CALCIUM SERPL-MCNC: 8.1 MG/DL — LOW (ref 8.4–10.5)
CALCIUM SERPL-MCNC: 8.4 MG/DL — SIGNIFICANT CHANGE UP (ref 8.4–10.5)
CALCIUM SERPL-MCNC: 8.4 MG/DL — SIGNIFICANT CHANGE UP (ref 8.4–10.5)
CHLORIDE SERPL-SCNC: 109 MMOL/L — HIGH (ref 96–108)
CHLORIDE SERPL-SCNC: 109 MMOL/L — HIGH (ref 96–108)
CHLORIDE SERPL-SCNC: 110 MMOL/L — HIGH (ref 96–108)
CHLORIDE SERPL-SCNC: 110 MMOL/L — HIGH (ref 96–108)
CO2 SERPL-SCNC: 22 MMOL/L — SIGNIFICANT CHANGE UP (ref 22–31)
CO2 SERPL-SCNC: 22 MMOL/L — SIGNIFICANT CHANGE UP (ref 22–31)
CO2 SERPL-SCNC: 24 MMOL/L — SIGNIFICANT CHANGE UP (ref 22–31)
CO2 SERPL-SCNC: 24 MMOL/L — SIGNIFICANT CHANGE UP (ref 22–31)
CREAT SERPL-MCNC: 0.58 MG/DL — SIGNIFICANT CHANGE UP (ref 0.5–1.3)
CREAT SERPL-MCNC: 0.58 MG/DL — SIGNIFICANT CHANGE UP (ref 0.5–1.3)
CREAT SERPL-MCNC: 0.59 MG/DL — SIGNIFICANT CHANGE UP (ref 0.5–1.3)
CREAT SERPL-MCNC: 0.64 MG/DL — SIGNIFICANT CHANGE UP (ref 0.5–1.3)
GLUCOSE BLDC GLUCOMTR-MCNC: 106 MG/DL — HIGH (ref 70–99)
GLUCOSE BLDC GLUCOMTR-MCNC: 113 MG/DL — HIGH (ref 70–99)
GLUCOSE BLDC GLUCOMTR-MCNC: 117 MG/DL — HIGH (ref 70–99)
GLUCOSE BLDC GLUCOMTR-MCNC: 126 MG/DL — HIGH (ref 70–99)
GLUCOSE BLDC GLUCOMTR-MCNC: 132 MG/DL — HIGH (ref 70–99)
GLUCOSE SERPL-MCNC: 105 MG/DL — HIGH (ref 70–99)
GLUCOSE SERPL-MCNC: 106 MG/DL — HIGH (ref 70–99)
GLUCOSE SERPL-MCNC: 121 MG/DL — HIGH (ref 70–99)
GLUCOSE SERPL-MCNC: 96 MG/DL — SIGNIFICANT CHANGE UP (ref 70–99)
HCT VFR BLD CALC: 29.5 % — LOW (ref 34.5–45)
HGB BLD-MCNC: 9.6 G/DL — LOW (ref 11.5–15.5)
MAGNESIUM SERPL-MCNC: 2 MG/DL — SIGNIFICANT CHANGE UP (ref 1.6–2.6)
MCHC RBC-ENTMCNC: 29.2 PG — SIGNIFICANT CHANGE UP (ref 27–34)
MCHC RBC-ENTMCNC: 32.5 GM/DL — SIGNIFICANT CHANGE UP (ref 32–36)
MCV RBC AUTO: 89.7 FL — SIGNIFICANT CHANGE UP (ref 80–100)
NRBC # BLD: 0 /100 WBCS — SIGNIFICANT CHANGE UP (ref 0–0)
PHOSPHATE SERPL-MCNC: 4.1 MG/DL — SIGNIFICANT CHANGE UP (ref 2.5–4.5)
PLATELET # BLD AUTO: 241 K/UL — SIGNIFICANT CHANGE UP (ref 150–400)
POTASSIUM SERPL-MCNC: 2.7 MMOL/L — CRITICAL LOW (ref 3.5–5.3)
POTASSIUM SERPL-MCNC: 2.8 MMOL/L — CRITICAL LOW (ref 3.5–5.3)
POTASSIUM SERPL-MCNC: 2.8 MMOL/L — CRITICAL LOW (ref 3.5–5.3)
POTASSIUM SERPL-MCNC: 2.9 MMOL/L — CRITICAL LOW (ref 3.5–5.3)
POTASSIUM SERPL-SCNC: 2.7 MMOL/L — CRITICAL LOW (ref 3.5–5.3)
POTASSIUM SERPL-SCNC: 2.8 MMOL/L — CRITICAL LOW (ref 3.5–5.3)
POTASSIUM SERPL-SCNC: 2.8 MMOL/L — CRITICAL LOW (ref 3.5–5.3)
POTASSIUM SERPL-SCNC: 2.9 MMOL/L — CRITICAL LOW (ref 3.5–5.3)
RBC # BLD: 3.29 M/UL — LOW (ref 3.8–5.2)
RBC # FLD: 18.4 % — HIGH (ref 10.3–14.5)
SODIUM SERPL-SCNC: 146 MMOL/L — HIGH (ref 135–145)
SODIUM SERPL-SCNC: 146 MMOL/L — HIGH (ref 135–145)
SODIUM SERPL-SCNC: 147 MMOL/L — HIGH (ref 135–145)
SODIUM SERPL-SCNC: 148 MMOL/L — HIGH (ref 135–145)
WBC # BLD: 9.8 K/UL — SIGNIFICANT CHANGE UP (ref 3.8–10.5)
WBC # FLD AUTO: 9.8 K/UL — SIGNIFICANT CHANGE UP (ref 3.8–10.5)

## 2021-12-21 PROCEDURE — 99232 SBSQ HOSP IP/OBS MODERATE 35: CPT

## 2021-12-21 PROCEDURE — 99497 ADVNCD CARE PLAN 30 MIN: CPT | Mod: 25

## 2021-12-21 PROCEDURE — 99223 1ST HOSP IP/OBS HIGH 75: CPT

## 2021-12-21 RX ORDER — DEXTROSE MONOHYDRATE, SODIUM CHLORIDE, AND POTASSIUM CHLORIDE 50; .745; 4.5 G/1000ML; G/1000ML; G/1000ML
1000 INJECTION, SOLUTION INTRAVENOUS
Refills: 0 | Status: DISCONTINUED | OUTPATIENT
Start: 2021-12-21 | End: 2021-12-22

## 2021-12-21 RX ORDER — HYDRALAZINE HCL 50 MG
5 TABLET ORAL EVERY 6 HOURS
Refills: 0 | Status: DISCONTINUED | OUTPATIENT
Start: 2021-12-21 | End: 2021-12-26

## 2021-12-21 RX ORDER — POTASSIUM CHLORIDE 20 MEQ
10 PACKET (EA) ORAL
Refills: 0 | Status: COMPLETED | OUTPATIENT
Start: 2021-12-21 | End: 2021-12-22

## 2021-12-21 RX ORDER — PANTOPRAZOLE SODIUM 20 MG/1
40 TABLET, DELAYED RELEASE ORAL DAILY
Refills: 0 | Status: DISCONTINUED | OUTPATIENT
Start: 2021-12-21 | End: 2021-12-27

## 2021-12-21 RX ORDER — PANTOPRAZOLE SODIUM 20 MG/1
40 TABLET, DELAYED RELEASE ORAL
Refills: 0 | Status: DISCONTINUED | OUTPATIENT
Start: 2021-12-21 | End: 2021-12-21

## 2021-12-21 RX ORDER — POTASSIUM CHLORIDE 20 MEQ
10 PACKET (EA) ORAL
Refills: 0 | Status: COMPLETED | OUTPATIENT
Start: 2021-12-21 | End: 2021-12-21

## 2021-12-21 RX ORDER — LEVOTHYROXINE SODIUM 125 MCG
70 TABLET ORAL AT BEDTIME
Refills: 0 | Status: DISCONTINUED | OUTPATIENT
Start: 2021-12-21 | End: 2021-12-28

## 2021-12-21 RX ADMIN — HEPARIN SODIUM 5000 UNIT(S): 5000 INJECTION INTRAVENOUS; SUBCUTANEOUS at 10:07

## 2021-12-21 RX ADMIN — Medication 3 MILLIGRAM(S): at 23:08

## 2021-12-21 RX ADMIN — Medication 5 MILLIGRAM(S): at 17:38

## 2021-12-21 RX ADMIN — Medication 70 MICROGRAM(S): at 23:07

## 2021-12-21 RX ADMIN — DEXTROSE MONOHYDRATE, SODIUM CHLORIDE, AND POTASSIUM CHLORIDE 75 MILLILITER(S): 50; .745; 4.5 INJECTION, SOLUTION INTRAVENOUS at 14:08

## 2021-12-21 RX ADMIN — DEXTROSE MONOHYDRATE, SODIUM CHLORIDE, AND POTASSIUM CHLORIDE 75 MILLILITER(S): 50; .745; 4.5 INJECTION, SOLUTION INTRAVENOUS at 23:13

## 2021-12-21 RX ADMIN — HEPARIN SODIUM 5000 UNIT(S): 5000 INJECTION INTRAVENOUS; SUBCUTANEOUS at 23:08

## 2021-12-21 RX ADMIN — Medication 100 MILLIEQUIVALENT(S): at 19:07

## 2021-12-21 RX ADMIN — Medication 100 MILLIEQUIVALENT(S): at 11:13

## 2021-12-21 RX ADMIN — ATORVASTATIN CALCIUM 40 MILLIGRAM(S): 80 TABLET, FILM COATED ORAL at 23:08

## 2021-12-21 RX ADMIN — Medication 100 MILLIEQUIVALENT(S): at 17:57

## 2021-12-21 RX ADMIN — Medication 200 MILLIGRAM(S): at 23:09

## 2021-12-21 RX ADMIN — Medication 100 MILLIEQUIVALENT(S): at 16:46

## 2021-12-21 RX ADMIN — Medication 10 MILLIGRAM(S): at 12:37

## 2021-12-21 RX ADMIN — Medication 5 MILLIGRAM(S): at 12:36

## 2021-12-21 RX ADMIN — Medication 100 MILLIEQUIVALENT(S): at 10:07

## 2021-12-21 RX ADMIN — Medication 650 MILLIGRAM(S): at 23:08

## 2021-12-21 RX ADMIN — PANTOPRAZOLE SODIUM 40 MILLIGRAM(S): 20 TABLET, DELAYED RELEASE ORAL at 14:09

## 2021-12-21 RX ADMIN — Medication 100 MILLIEQUIVALENT(S): at 12:36

## 2021-12-21 RX ADMIN — Medication 5 MILLIGRAM(S): at 23:07

## 2021-12-21 NOTE — PROVIDER CONTACT NOTE (CRITICAL VALUE NOTIFICATION) - RECOMMENDATIONS
tele monitor, IV potassium, provider come to assess patient.
Follow up labs at 10 am
provider assessment
IVPB Kcl
Redraw CBC and complete pink tube

## 2021-12-21 NOTE — SWALLOW BEDSIDE ASSESSMENT ADULT - NS SPL SWALLOW CLINIC TRIAL FT
Pt presented with spoon coated with honey thick liquid and applesauce; however, no PO trials were administered, given pt's reduced orientation to the feeding task and intermittent alertness/orientation.

## 2021-12-21 NOTE — CONSULT NOTE ADULT - SUBJECTIVE AND OBJECTIVE BOX
HPI:  89 y/o F PMH of COPD, HTN, HLD, hypothyroidism, TIA, trigeminal neuralgia, T2DM, and dementia who was BIBEMS from [facility] to the ED with an unwitnessed fall. Reached out to [facility] for a callback for additional information. History obtained per chart documentation. Unable to obtain history given pt's mental status. Per EMS report, pt had received her home BP medications.  She was recently admitted at [facility] and discharged on 12/2 admitted then for AMS, found on imaging to have occlusions/stenosis of L posterior cerebral a, seen by neurology. She was noted to have HTN urgency and medications titrated.    In the ED, intially hypotensive SBP 80s on admission, s/p 1 L IVF with improvement in SBP to 120s. Afebrile.  Medications received: zosyn iv, LR 1L (17 Dec 2021 12:47)    PERTINENT PM/SXH:   Hypertension    Transient ischemic attack (TIA)    Chronic obstructive pulmonary disease (COPD)    Dementia    T2DM (type 2 diabetes mellitus)    HLD (hyperlipidemia)    Hypothyroidism    Trigeminal neuralgia      No significant past surgical history      FAMILY HISTORY:    ITEMS NOT CHECKED ARE NOT PRESENT    SOCIAL HISTORY:   Significant other/partner[ ]  Children[ ]  Christianity/Spirituality:  Substance hx:  [ ]   Tobacco hx:  [ ]   Alcohol hx: [ ]   Home Opioid hx:  [ ] I-Stop Reference No:  Living Situation: [ ]Home  [ ]Long term care  [ ]Rehab [ ]Other    ADVANCE DIRECTIVES:    DNR  MOLST  [ ]  Living Will  [ ]   DECISION MAKER(s):  [ ] Health Care Proxy(s)  [ ] Surrogate(s)  [ ] Guardian           Name(s): Phone Number(s):    BASELINE (I)ADL(s) (prior to admission):  [place]: [ ]Total  [ ] Moderate [ ]Dependent    Allergies    Ambien (Unknown)  aspirin (Unknown)  sulfa drugs (Unknown)    Intolerances    MEDICATIONS  (STANDING):  acetaminophen     Tablet .. 650 milliGRAM(s) Oral every 6 hours  atorvastatin 40 milliGRAM(s) Oral at bedtime  bisacodyl Suppository 10 milliGRAM(s) Rectal daily  carBAMazepine 200 milliGRAM(s) Oral three times a day  cholecalciferol 2000 Unit(s) Oral daily  dextrose 5% + sodium chloride 0.45% with potassium chloride 20 mEq/L 1000 milliLiter(s) (75 mL/Hr) IV Continuous <Continuous>  ferrous    sulfate 325 milliGRAM(s) Oral daily  heparin   Injectable 5000 Unit(s) SubCutaneous every 12 hours  hydrALAZINE Injectable 5 milliGRAM(s) IV Push every 6 hours  levothyroxine Injectable 70 MICROGram(s) IV Push at bedtime  losartan 25 milliGRAM(s) Oral daily  multivitamin 1 Tablet(s) Oral daily  pantoprazole  Injectable 40 milliGRAM(s) IV Push daily  polyethylene glycol 3350 17 Gram(s) Oral two times a day  senna 2 Tablet(s) Oral at bedtime    MEDICATIONS  (PRN):  melatonin 3 milliGRAM(s) Oral at bedtime PRN Insomnia    PRESENT SYMPTOMS: [ ]Unable to obtain due to poor mentation   Source if other than patient:  [ ]Family   [ ]Team     Pain: [ ]yes [ ]no  QOL impact -   Location -                    Aggravating factors -  Quality -  Radiation -  Timing-  Severity (0-10 scale):  Minimal acceptable level (0-10 scale):     CPOT:    https://www.[facility].org/getattachment/[email or web address]/Critical-Care-Pain-Observation-Tool-(CPOT)      PAIN AD Score:     http://geriatrictoolkit.[email or web address]/cog/painad.pdf (press ctrl +  left click to view)    Dyspnea:                           [ ]Mild [ ]Moderate [ ]Severe  Anxiety:                             [ ]Mild [ ]Moderate [ ]Severe  Fatigue:                             [ ]Mild [ ]Moderate [ ]Severe  Nausea:                             [ ]Mild [ ]Moderate [ ]Severe  Loss of appetite:              [ ]Mild [ ]Moderate [ ]Severe  Constipation:                    [ ]Mild [ ]Moderate [ ]Severe    Other Symptoms:  [ ]All other review of systems negative     Palliative Performance Status Version 2:         %    http://npcrc.org/files/news/palliative_performance_scale_ppsv2.pdf  PHYSICAL EXAM:  Vital Signs Last 24 Hrs  T(C): 36.7 (21 Dec 2021 12:34), Max: 37.1 (21 Dec 2021 04:59)  T(F): 98.1 (21 Dec 2021 12:34), Max: 98.8 (21 Dec 2021 04:59)  HR: 93 (21 Dec 2021 12:34) (85 - 93)  BP: 170/100 (21 Dec 2021 13:28) (167/72 - 200/100)  BP(mean): --  RR: 17 (21 Dec 2021 12:34) (17 - 17)  SpO2: 95% (21 Dec 2021 12:34) (95% - 96%) I&O's Summary    20 Dec 2021 07:01  -  21 Dec 2021 07:00  --------------------------------------------------------  IN: 0 mL / OUT: 500 mL / NET: -500 mL    21 Dec 2021 07:01  -  21 Dec 2021 14:19  --------------------------------------------------------  IN: 0 mL / OUT: 0 mL / NET: 0 mL      GENERAL:  [ ]Alert  [ ]Oriented x   [ ]Lethargic  [ ]Cachexia  [ ]Unarousable  [ ]Verbal  [ ]Non-Verbal  Behavioral:   [ ] Anxiety  [ ] Delirium [ ] Agitation [ ] Other  HEENT:  [ ]Normal   [ ]Dry mouth   [ ]ET Tube/Trach  [ ]Oral lesions  PULMONARY:   [ ]Clear [ ]Tachypnea  [ ]Audible excessive secretions   [ ]Rhonchi        [ ]Right [ ]Left [ ]Bilateral  [ ]Crackles        [ ]Right [ ]Left [ ]Bilateral  [ ]Wheezing     [ ]Right [ ]Left [ ]Bilateral  [ ]Diminished breath sounds [ ]right [ ]left [ ]bilateral  CARDIOVASCULAR:    [ ]Regular [ ]Irregular [ ]Tachy  [ ]Hamlet [ ]Murmur [ ]Other  GASTROINTESTINAL:  [ ]Soft  [ ]Distended   [ ]+BS  [ ]Non tender [ ]Tender  [ ]PEG [ ]OGT/ NGT  Last BM:   GENITOURINARY:  [ ]Normal [ ] Incontinent   [ ]Oliguria/Anuria   [ ]Hendricks  MUSCULOSKELETAL:   [ ]Normal   [ ]Weakness  [ ]Bed/Wheelchair bound [ ]Edema  NEUROLOGIC:   [ ]No focal deficits  [ ]Cognitive impairment  [ ]Dysphagia [ ]Dysarthria [ ]Paresis [ ]Other   SKIN:   [ ]Normal    [ ]Rash  [ ]Pressure ulcer(s)       Present on admission [ ]y [ ]n    CRITICAL CARE:  [ ] Shock Present  [ ]Septic [ ]Cardiogenic [ ]Neurologic [ ]Hypovolemic  [ ]  Vasopressors [ ]  Inotropes   [ ]Respiratory failure present [ ]Mechanical ventilation [ ]Non-invasive ventilatory support [ ]High flow  [ ]Acute  [ ]Chronic [ ]Hypoxic  [ ]Hypercarbic [ ]Other  [ ]Other organ failure     LABS:                        9.6    9.80  )-----------( 241      ( 21 Dec 2021 07:11 )             29.5   12-21    148<H>  |  110<H>  |  26<H>  ----------------------------<  106<H>  2.7<LL>   |  24  |  0.59    Ca    8.1<L>      21 Dec 2021 09:00  Phos  4.1     12-21  Mg     2.0     12-21    TPro  5.7<L>  /  Alb  3.6  /  TBili  0.3  /  DBili  x   /  AST  29  /  ALT  18  /  AlkPhos  62  12-20        RADIOLOGY & ADDITIONAL STUDIES: < from: Xray Abdomen 1 View PORTABLE -Urgent (Xray Abdomen 1 View PORTABLE -Urgent .) (12.20.21 @ 07:27) >    ACC: [id number] EXAM:  XR ABDOMEN PORTABLE URGENT 1V                          PROCEDURE DATE:  12/20/2021          INTERPRETATION:  CLINICAL INDICATION: Constipation    TECHNIQUE: One view of the abdomen.    COMPARISON: CT abdomen pelvis 12/18/2021, abdominal x-ray dated 12/1/2021    FINDINGS:  Mildly distended loops of air-filled large bowel. These appear decreased   in caliber when compared to the  radiograph from 12/18/2021.   Moderate stool throughout the colon.    Visualized osseous structures are unremarkable.    IMPRESSION:  Multiple distended loops of air-filled large bowel which appear decreased   in caliber compared to the study radiograph. This may represent a   resolving ileus or distal obstruction.    --- End of Report ---          MELANIE TEJEDA MD; Resident Radiologist  This document has been electronically signed.  FLACA GAN MD; Attending Radiologist  This document has been electronically signed. Dec 20 2021  1:39PM    < end of copied text >      PROTEIN CALORIE MALNUTRITION PRESENT: [ ]mild [ ]moderate [ ]severe [ ]underweight [ ]morbid obesity  https://www.andeal.org/vault/0290/web/files/ONC/Table_Clinical%20Characteristics%20to%20Document%20Malnutrition-White%20JV%20et%20al%202012.pdf    Height (cm): 160 (12-18-21 @ 20:11), 160 (11-28-21 @ 19:21), 160 (10-26-21 @ 21:52)  Weight (kg): 53.5 (12-18-21 @ 20:11), 63.5 (11-28-21 @ 19:21), 56.7 (10-26-21 @ 21:52)  BMI (kg/m2): 20.9 (12-18-21 @ 20:11), 24.8 (11-28-21 @ 19:21), 22.1 (10-26-21 @ 21:52)    [ ]PPSV2 < or = to 30% [ ]significant weight loss  [ ]poor nutritional intake  [ ]anasarca      [ ]Artificial Nutrition      REFERRALS:   [ ]Chaplaincy  [ ]Hospice  [ ]Child Life  [ ]Social Work  [ ]Case management [ ]Holistic Therapy     Goals of Care Document:        HPI:  88 y/o F PMH of COPD, HTN, HLD, hypothyroidism, TIA, trigeminal neuralgia, T2DM, and dementia who was BIBEMS from [facility] to the ED with an unwitnessed fall. Reached out to [facility] for a callback for additional information. History obtained per chart documentation. Unable to obtain history given pt's mental status. Per EMS report, pt had received her home BP medications.  She was recently admitted at [facility] and discharged on 12/2 admitted then for AMS, found on imaging to have occlusions/stenosis of L posterior cerebral a, seen by neurology. She was noted to have HTN urgency and medications titrated.    In the ED, intially hypotensive SBP 80s on admission, s/p 1 L IVF with improvement in SBP to 120s. Afebrile.  Medications received: zosyn iv, LR 1L (17 Dec 2021 12:47)    PERTINENT PM/SXH:   Hypertension    Transient ischemic attack (TIA)    Chronic obstructive pulmonary disease (COPD)    Dementia    T2DM (type 2 diabetes mellitus)    HLD (hyperlipidemia)    Hypothyroidism    Trigeminal neuralgia      No significant past surgical history      FAMILY HISTORY:    ITEMS NOT CHECKED ARE NOT PRESENT    SOCIAL HISTORY:   Significant other/partner[ ]  Children[x]  Worship/Spirituality:  Substance hx:  [ ]   Tobacco hx:  [ ]   Alcohol hx: [ ]   Home Opioid hx:  [ ] I-Stop Reference No:  Living Situation: [ ]Home  [ ]Long term care  [ ]Rehab [ ]Other- [facility]    ADVANCE DIRECTIVES:    DNR  MOLST  [x]  Living Will  [ ]   DECISION MAKER(s):  [ ] Health Care Proxy(s)  [x] Surrogate(s)  [ ] Guardian           Name(s): Phone Number(s):  3 Children Elias Hamilton and Bryan   BASELINE (I)ADL(s) (prior to admission):  [place]: [ ]Total  [x] Moderate [ ]Dependent    Allergies    Ambien (Unknown)  aspirin (Unknown)  sulfa drugs (Unknown)    Intolerances    MEDICATIONS  (STANDING):  acetaminophen     Tablet .. 650 milliGRAM(s) Oral every 6 hours  atorvastatin 40 milliGRAM(s) Oral at bedtime  bisacodyl Suppository 10 milliGRAM(s) Rectal daily  carBAMazepine 200 milliGRAM(s) Oral three times a day  cholecalciferol 2000 Unit(s) Oral daily  dextrose 5% + sodium chloride 0.45% with potassium chloride 20 mEq/L 1000 milliLiter(s) (75 mL/Hr) IV Continuous <Continuous>  ferrous    sulfate 325 milliGRAM(s) Oral daily  heparin   Injectable 5000 Unit(s) SubCutaneous every 12 hours  hydrALAZINE Injectable 5 milliGRAM(s) IV Push every 6 hours  levothyroxine Injectable 70 MICROGram(s) IV Push at bedtime  losartan 25 milliGRAM(s) Oral daily  multivitamin 1 Tablet(s) Oral daily  pantoprazole  Injectable 40 milliGRAM(s) IV Push daily  polyethylene glycol 3350 17 Gram(s) Oral two times a day  senna 2 Tablet(s) Oral at bedtime    MEDICATIONS  (PRN):  melatonin 3 milliGRAM(s) Oral at bedtime PRN Insomnia    PRESENT SYMPTOMS: [x]Unable to obtain due to poor mentation   Source if other than patient:  [ ]Family   [x]Team     Pain: [ ]yes [x]no see PAINAD score  QOL impact -   Location -                    Aggravating factors -  Quality -  Radiation -  Timing-  Severity (0-10 scale):  Minimal acceptable level (0-10 scale):     CPOT:    https://www.[facility].org/getattachment/[email or web address]-0r3p-[email or web address]-3044e7613z6n/Critical-Care-Pain-Observation-Tool-(CPOT)      PAIN AD Score: 0    http://geriatrictoolkit.[place].[facility]/cog/painad.pdf (press ctrl +  left click to view)    Dyspnea:                           [ ]Mild [ ]Moderate [ ]Severe  Anxiety:                             [ ]Mild [ ]Moderate [ ]Severe  Fatigue:                             [ ]Mild [ ]Moderate [ ]Severe  Nausea:                             [ ]Mild [ ]Moderate [ ]Severe  Loss of appetite:              [ ]Mild [x]Moderate [ ]Severe  Constipation:                    [ ]Mild [ ]Moderate [x]Severe    Other Symptoms:  [x] unable to appropriately assess due to cognitive impairment    Palliative Performance Status Version 2:    20    %    http://npcrc.org/files/news/palliative_performance_scale_ppsv2.pdf  PHYSICAL EXAM:  Vital Signs Last 24 Hrs  T(C): 36.7 (21 Dec 2021 12:34), Max: 37.1 (21 Dec 2021 04:59)  T(F): 98.1 (21 Dec 2021 12:34), Max: 98.8 (21 Dec 2021 04:59)  HR: 93 (21 Dec 2021 12:34) (85 - 93)  BP: 170/100 (21 Dec 2021 13:28) (167/72 - 200/100)  BP(mean): --  RR: 17 (21 Dec 2021 12:34) (17 - 17)  SpO2: 95% (21 Dec 2021 12:34) (95% - 96%) I&O's Summary    20 Dec 2021 07:01  -  21 Dec 2021 07:00  --------------------------------------------------------  IN: 0 mL / OUT: 500 mL / NET: -500 mL    21 Dec 2021 07:01  -  21 Dec 2021 14:19  --------------------------------------------------------  IN: 0 mL / OUT: 0 mL / NET: 0 mL      GENERAL:  [x] arousable [ ]Oriented x   [ ]Lethargic  [ ]Cachexia  [ ]Unarousable  [x]Verbal- minimally  [ ]Non-Verbal  Behavioral:   [ ] Anxiety  [ ] Delirium [ ] Agitation [ ] Other  HEENT:  [x]Normal   [ ]Dry mouth   [ ]ET Tube/Trach  [ ]Oral lesions  PULMONARY:   [x]Clear [ ]Tachypnea  [ ]Audible excessive secretions   [ ]Rhonchi        [ ]Right [ ]Left [ ]Bilateral  [ ]Crackles        [ ]Right [ ]Left [ ]Bilateral  [ ]Wheezing     [ ]Right [ ]Left [ ]Bilateral  [ ]Diminished breath sounds [ ]right [ ]left [ ]bilateral  CARDIOVASCULAR:    [x]Regular [ ]Irregular [ ]Tachy  [ ]Hamlet [ ]Murmur [ ]Other  GASTROINTESTINAL:  [x]Soft  [ ]Distended   [ ]+BS  [x]Non tender [ ]Tender  [ ]PEG [ ]OGT/ NGT  Last BM: 12/17  GENITOURINARY:  [ ]Normal [x] Incontinent   [ ]Oliguria/Anuria   [ ]Hendricks  MUSCULOSKELETAL:   [ ]Normal   [x]Weakness  [ ]Bed/Wheelchair bound [ ]Edema  NEUROLOGIC:   [ ]No focal deficits  [x]Cognitive impairment  [ ]Dysphagia [ ]Dysarthria [ ]Paresis [ ]Other   SKIN:   [x]Normal    [ ]Rash  [ ]Pressure ulcer(s)       Present on admission [ ]y [ ]n    CRITICAL CARE:  [ ] Shock Present  [ ]Septic [ ]Cardiogenic [ ]Neurologic [ ]Hypovolemic  [ ]  Vasopressors [ ]  Inotropes   [ ]Respiratory failure present [ ]Mechanical ventilation [ ]Non-invasive ventilatory support [ ]High flow  [ ]Acute  [ ]Chronic [ ]Hypoxic  [ ]Hypercarbic [ ]Other  [ ]Other organ failure     LABS:                        9.6    9.80  )-----------( 241      ( 21 Dec 2021 07:11 )             29.5   12-21    148<H>  |  110<H>  |  26<H>  ----------------------------<  106<H>  2.7<LL>   |  24  |  0.59    Ca    8.1<L>      21 Dec 2021 09:00  Phos  4.1     12-21  Mg     2.0     12-21    TPro  5.7<L>  /  Alb  3.6  /  TBili  0.3  /  DBili  x   /  AST  29  /  ALT  18  /  AlkPhos  62  12-20        RADIOLOGY & ADDITIONAL STUDIES: < from: Xray Abdomen 1 View PORTABLE -Urgent (Xray Abdomen 1 View PORTABLE -Urgent .) (12.20.21 @ 07:27) >    ACC: [id number] EXAM:  XR ABDOMEN PORTABLE URGENT 1V                          PROCEDURE DATE:  12/20/2021          INTERPRETATION:  CLINICAL INDICATION: Constipation    TECHNIQUE: One view of the abdomen.    COMPARISON: CT abdomen pelvis 12/18/2021, abdominal x-ray dated 12/1/2021    FINDINGS:  Mildly distended loops of air-filled large bowel. These appear decreased   in caliber when compared to the  radiograph from 12/18/2021.   Moderate stool throughout the colon.    Visualized osseous structures are unremarkable.    IMPRESSION:  Multiple distended loops of air-filled large bowel which appear decreased   in caliber compared to the study radiograph. This may represent a   resolving ileus or distal obstruction.    --- End of Report ---          MELANIE TEJEDA MD; Resident Radiologist  This document has been electronically signed.  FLACA GAN MD; Attending Radiologist  This document has been electronically signed. Dec 20 2021  1:39PM    < end of copied text >      PROTEIN CALORIE MALNUTRITION PRESENT: [ ]mild [ ]moderate [ ]severe [ ]underweight [ ]morbid obesity  https://www.andeal.org/vault/2440/web/files/ONC/Table_Clinical%20Characteristics%20to%20Document%20Malnutrition-White%20JV%20et%20al%202012.pdf    Height (cm): 160 (12-18-21 @ 20:11), 160 (11-28-21 @ 19:21), 160 (10-26-21 @ 21:52)  Weight (kg): 53.5 (12-18-21 @ 20:11), 63.5 (11-28-21 @ 19:21), 56.7 (10-26-21 @ 21:52)  BMI (kg/m2): 20.9 (12-18-21 @ 20:11), 24.8 (11-28-21 @ 19:21), 22.1 (10-26-21 @ 21:52)    [ ]PPSV2 < or = to 30% [ ]significant weight loss  [ ]poor nutritional intake  [ ]anasarca      [ ]Artificial Nutrition      REFERRALS:   [ ]Chaplaincy  [ ]Hospice  [ ]Child Life  [ ]Social Work  [ ]Case management [ ]Holistic Therapy     Goals of Care Document:

## 2021-12-21 NOTE — SWALLOW BEDSIDE ASSESSMENT ADULT - ADDITIONAL RECOMMENDATIONS
1) Maintain good oral hygiene.  2) Continue GOC conversation re: means of nutrition/hydration. 1) Maintain good oral hygiene.  2) Continue GOC conversation.

## 2021-12-21 NOTE — DIETITIAN INITIAL EVALUATION ADULT. - ORAL INTAKE PTA/DIET HISTORY
Unable to obtain PO intake PTA or diet history at this time. As per chart: pt with NKFA; taking Multivitamin, Vitamin D3, and Ferrous Sulfate PTA; not taking any insulin/medications for BG at home, HbA1c (12/02) 5.6% - indicates good BG control.

## 2021-12-21 NOTE — PROGRESS NOTE ADULT - SUBJECTIVE AND OBJECTIVE BOX
[facility]    Vicente Estevez, Lidya, Shimon, Marlena, Fransico, Tracey      [phone]    CHIEF COMPLAINT: Patient is a 89y old  Female who presents with a chief complaint of unwitnessed fall (20 Dec 2021 09:05)      Follow Up: fall, htn    Interim history: Unable to provide a history on the basis of a poor mental status.  Events noted.    MEDICATIONS  (STANDING):  acetaminophen     Tablet .. 650 milliGRAM(s) Oral every 6 hours  amLODIPine   Tablet 10 milliGRAM(s) Oral daily  atorvastatin 40 milliGRAM(s) Oral at bedtime  bisacodyl 5 milliGRAM(s) Oral daily  carBAMazepine 200 milliGRAM(s) Oral three times a day  cholecalciferol 2000 Unit(s) Oral daily  ferrous    sulfate 325 milliGRAM(s) Oral daily  heparin   Injectable 5000 Unit(s) SubCutaneous every 12 hours  lactated ringers. 1000 milliLiter(s) (50 mL/Hr) IV Continuous <Continuous>  levothyroxine 125 MICROGram(s) Oral daily  losartan 25 milliGRAM(s) Oral daily  multivitamin 1 Tablet(s) Oral daily  polyethylene glycol 3350 17 Gram(s) Oral two times a day  senna 2 Tablet(s) Oral at bedtime    MEDICATIONS  (PRN):  melatonin 3 milliGRAM(s) Oral at bedtime PRN Insomnia      REVIEW OF SYSTEMS: unable to provide  Vital Signs Last 24 Hrs  T(C): 37.1 (21 Dec 2021 04:59), Max: 37.1 (21 Dec 2021 04:59)  T(F): 98.8 (21 Dec 2021 04:59), Max: 98.8 (21 Dec 2021 04:59)  HR: 85 (21 Dec 2021 04:59) (85 - 93)  BP: 167/72 (21 Dec 2021 04:59) (163/93 - 175/90)  BP(mean): --  RR: 17 (21 Dec 2021 04:59) (17 - 18)  SpO2: 95% (21 Dec 2021 04:59) (95% - 96%)    I&O's Summary    20 Dec 2021 07:01  -  21 Dec 2021 07:00  --------------------------------------------------------  IN: 0 mL / OUT: 500 mL / NET: -500 mL        Telemetry past 24h:     PHYSICAL EXAM:    Constitutional: well-nourished, well-developed, NAD   HEENT:  MMM, sclerae anicteric, conjunctivae clear, no oral cyanosis.  Pulmonary: Non-labored, breath sounds are clear bilaterally, No wheezing, rales or rhonchi  Cardiovascular: Regular, S1 and S2.  No murmur.  No rubs, gallops or clicks  Gastrointestinal: Bowel Sounds present, soft, nontender.   Lymph: No peripheral edema.   Neurological: unable to evaluate, poor mental status  Skin: No rashes.  Psych:  Mood & affect not evaluable    LABS: All Labs Reviewed:                        9.6    9.80  )-----------( 241      ( 21 Dec 2021 07:11 )             29.5                         9.9    11.02 )-----------( 204      ( 20 Dec 2021 07:07 )             30.8                         9.8    12.77 )-----------( 210      ( 19 Dec 2021 07:30 )             29.9     20 Dec 2021 07:02    146    |  112    |  35     ----------------------------<  106    3.9     |  21     |  0.61   19 Dec 2021 07:31    143    |  111    |  48     ----------------------------<  101    3.8     |  19     |  0.65   18 Dec 2021 22:42    143    |  109    |  57     ----------------------------<  113    3.2     |  21     |  0.76     Ca    9.0        20 Dec 2021 07:02  Ca    8.2        19 Dec 2021 07:31  Ca    8.3        18 Dec 2021 22:42  Phos  3.8       20 Dec 2021 07:02  Mg     2.2       20 Dec 2021 07:02    TPro  5.7    /  Alb  3.6    /  TBili  0.3    /  DBili  x      /  AST  29     /  ALT  18     /  AlkPhos  62     20 Dec 2021 07:02      CARDIAC MARKERS ( 20 Dec 2021 07:02 )  x     / x     / 106 U/L / x     / x          Blood Culture: Organism --  Gram Stain Blood -- Gram Stain --  Specimen Source .Blood Blood-Peripheral  Culture-Blood --    Organism --  Gram Stain Blood -- Gram Stain --  Specimen Source Clean Catch Clean Catch (Midstream)  Culture-Blood --            RADIOLOGY:    EKG:    Echo:

## 2021-12-21 NOTE — DIETITIAN INITIAL EVALUATION ADULT. - PHYSCIAL ASSESSMENT
Skin: pressure ulcers in griselda. heel and sacrum stage 1 as per documentation.  Noted visual signs of severe muscle loss in temporales and clavicles; unable to visually assess other covered areas.

## 2021-12-21 NOTE — CONSULT NOTE ADULT - ASSESSMENT
89 y/o F PMH of COPD, HTN, HLD, hypothyroidism, TIA, trigeminal neuralgia, T2DM, and dementia who was BIBEMS from [facility] to the ED with an unwitnessed fall adm with acute metabolic encephalopathy 2/2 constipation, ANGELICA, and further workup for falls. Palliative consulted for goals of care.

## 2021-12-21 NOTE — DIETITIAN INITIAL EVALUATION ADULT. - REASON FOR ADMISSION
Pt 89 y/o F with PMH as per chart: COPD, HTN, HLD, hypothyroidism, TIA, trigeminal neuralgia, T2DM, dementia, who was BIBEMS from [facility] to the ED with an unwitnessed fall, found with acute metabolic encephalopathy 2/2 constipation, ANGELICA, severely anemic, refusing to open oral cavity when presented with PO via cup and spoon on swallow evaluation attempt (12/20).

## 2021-12-21 NOTE — CONSULT NOTE ADULT - CONVERSATION DETAILS
Patient unable to participate in goals of care discussion due to cognitive impairment in setting of advanced dementia. Goals of care discussion with patient's children Joy and Elias. Bryan unavailable at this time. Joy and Elias shared that patient has advanced dementia and has been in memory unit at [facility]. We discussed patient's overall condition and goals of care. They shared that patient would not want artifical nutrition and their goal is to keep her as comfortable as possible. Elias stated he has been working to transfer patient to [facility] where hospice services are available. They are in agreement with hospice referral. Confirmed DNR/DNI. MOLST was completed by primary team. All questions answered and emotional support provided.

## 2021-12-21 NOTE — SWALLOW BEDSIDE ASSESSMENT ADULT - SWALLOW EVAL: DIAGNOSIS
Patient admitted from [facility] s/p fall with acute metabolic encephalopathy. Attempted to see pt for bedside swallow evaluation, but pt adamantly refused stating "no" repetitively and refusing to open oral cavity when presented with PO via cup and spoon. ELAINE May informed - this service to f/u and re-attempt as schedule permits.
89 y/o F PMH of COPD, HTN, HLD, hypothyroidism, TIA, trigeminal neuralgia, T2DM, and dementia who was BIBEMS from [facility] to the ED with an unwitnessed fall adm with acute metabolic encephalopathy 2/2 constipation, ANGELICA, and further workup for falls. Pt presents with 1) reduced orientation to the feeding task. Pt with no response to spoon coated with honey thick liquid and apple sauce at the entrance of the oral cavity, with reduced oral grading. 2) intermittent periods of alertness. During moments of sustained eye opening, pt with nonverbal gesture of shaking head "yes" to the question, "Would you like something to eat?" Though pt did not actively refuse PO intake as compared to previous encounter, unable to obtain complete assessment of pt's oropharyngeal swallow function, given lack of orientation to the feeding task interfering with acceptance of trials.

## 2021-12-21 NOTE — SWALLOW BEDSIDE ASSESSMENT ADULT - SLP GENERAL OBSERVATIONS
Pt encountered in bed, asleep but aroused with moderate verbal and tactile cueing, on room air. Pt with intermittent periods of alertness throughout limited assessment. Pt with continuous audible baseline moans.

## 2021-12-21 NOTE — PROVIDER CONTACT NOTE (CRITICAL VALUE NOTIFICATION) - ASSESSMENT
patient mental status at baseline. does not take PO.
Patient is A&OX0, Inc x 2
pt a&ox1 to name. vss. denies pain.
Patient with A&OX4, anxious.

## 2021-12-21 NOTE — CONSULT NOTE ADULT - PROBLEM SELECTOR RECOMMENDATION 5
Goals are established. Plan for d/c to [facility] with hospice services. CM and primary team ACP notified. No uncontrolled symptoms or other palliative needs identified.     Palliative team will sign off. Please reconsult as needed.    For acute issues or uncontrolled symptoms please page palliative team.    Mihaela Ardon MD  Palliative Medicine Attending   [facility] Pager [phone]

## 2021-12-21 NOTE — DIETITIAN INITIAL EVALUATION ADULT. - ADD RECOMMEND
1. Will continue to monitor PO intake, weight, labs, skin, GI status, diet. 2. Gently encourage PO intake and honor food preferences. 3. Continue Multivitamin as medically feasible to optimize nutrient intake and further aid with pressure ulcer healing. 4. Consider appetite stimulant if no medical contraindications. 5. Malnutrition notification placed in chart.

## 2021-12-21 NOTE — PROGRESS NOTE ADULT - SUBJECTIVE AND OBJECTIVE BOX
SUBJECTIVE / OVERNIGHT EVENTS:pt seen and examined  21   pt had little bm  didnt participate in speech/ swallow eval- pts family was informed  MEDICATIONS  (STANDING):  acetaminophen     Tablet .. 650 milliGRAM(s) Oral every 6 hours  atorvastatin 40 milliGRAM(s) Oral at bedtime  bisacodyl Suppository 10 milliGRAM(s) Rectal daily  carBAMazepine 200 milliGRAM(s) Oral three times a day  cholecalciferol 2000 Unit(s) Oral daily  dextrose 5% + sodium chloride 0.45% with potassium chloride 20 mEq/L 1000 milliLiter(s) (75 mL/Hr) IV Continuous <Continuous>  ferrous    sulfate 325 milliGRAM(s) Oral daily  heparin   Injectable 5000 Unit(s) SubCutaneous every 12 hours  hydrALAZINE Injectable 5 milliGRAM(s) IV Push every 6 hours  levothyroxine Injectable 70 MICROGram(s) IV Push at bedtime  losartan 25 milliGRAM(s) Oral daily  multivitamin 1 Tablet(s) Oral daily  pantoprazole  Injectable 40 milliGRAM(s) IV Push daily  polyethylene glycol 3350 17 Gram(s) Oral two times a day  potassium chloride  10 mEq/100 mL IVPB 10 milliEquivalent(s) IV Intermittent every 1 hour  senna 2 Tablet(s) Oral at bedtime    MEDICATIONS  (PRN):  melatonin 3 milliGRAM(s) Oral at bedtime PRN Insomnia    Vital Signs Last 24 Hrs  T(C): 37.1 (21 @ 21:27), Max: 37.1 (21 @ 04:59)  T(F): 98.7 (21 @ 21:27), Max: 98.8 (21 @ 04:59)  HR: 85 (21 @ 21:27) (85 - 93)  BP: 185/89 (21 @ 21:27) (167/72 - 200/100)  BP(mean): --  RR: 18 (21 @ 21:27) (17 - 18)  SpO2: 97% (21 @ 21:27) (95% - 97%)        I&O's Summary      PHYSICAL EXAM:  GENERAL: NAD  EYES: EOMI, PERRLA  NECK: Supple, No JVD  CHEST/LUNG: dec breath sounds at bases  HEART:  S1 , S2 +  ABDOMEN: soft  , bs+  EXTREMITIES:  trace edema  NEUROLOGY:alert awake      LABS:      146<H>  |  110<H>  |  23  ----------------------------<  121<H>  2.9<LL>   |  22  |  0.58    Ca    7.9<L>      21 Dec 2021 22:41  Phos  4.1       Mg     2.0         TPro  5.7<L>  /  Alb  3.6  /  TBili  0.3  /  DBili      /  AST  29  /  ALT  18  /  AlkPhos  62      Creatinine Trend: 0.58 <--, 0.58 <--, 0.59 <--, 0.64 <--, 0.61 <--, 0.65 <--, 0.76 <--, 0.83 <--, 0.91 <--, 1.07 <--                        9.6    9.80  )-----------( 241      ( 21 Dec 2021 07:11 )             29.5     Urine Studies:  Urinalysis Basic - ( 17 Dec 2021 07:59 )    Color: Yellow / Appearance: Clear / S.023 / pH:   Gluc:  / Ketone: Negative  / Bili: Negative / Urobili: 2 mg/dL   Blood:  / Protein: 30 mg/dL / Nitrite: Negative   Leuk Esterase: Negative / RBC: 4 /hpf / WBC 2 /HPF   Sq Epi:  / Non Sq Epi: 1 /hpf / Bacteria: Negative        CARDIAC MARKERS ( 20 Dec 2021 07:02 )  x     / x     / 106 U/L / x     / x            LIVER FUNCTIONS - ( 20 Dec 2021 07:02 )  Alb: 3.6 g/dL / Pro: 5.7 g/dL / ALK PHOS: 62 U/L / ALT: 18 U/L / AST: 29 U/L / GGT: x

## 2021-12-21 NOTE — CONSULT NOTE ADULT - ASSESSMENT
89 y/o F PMH of COPD, HTN, HLD, hypothyroidism, TIA, trigeminal neuralgia, T2DM, and dementia who was BIBEMS from "[employer]" to the ED with an unwitnessed fall  Anemia - without overt/brisk GI bleed; brisk Hgb response to 2 units PRBCs 12/18, Hgb stable  CT CAP - without evidence of bleed    Found to have large amount of stool in sigmoid and rectum on CT in ED 12/18- reported fecal disimpaction and enema in ER with passage of stool.    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 on 12/21 clinical exam - improved after MARSHALL  -replete lytes to goal K 4-4.5 and Mg >2  -Synthroid management per primary team (severe hypothyroid state can decrease GI motility)  -bowel regimen: Miralax, Senna and Dulcolax supp HS  -monitor clinically  -ok for PO diet as tolerated, add supplements    Anemia - without overt/brisk GI bleed; Hgb stable after transfusions  B12 and Folate WNL  Iron studies npt c/w iron deficiency  -PO Iron supplements as ordered  -would not pursue invasive GI work-up given no overt GI bleeding and pt's severe dementia    Discussed with Medicine ACP    Thank you for the courtesy of this consult.    Claus Klein PA-C    [facility]  [phone]  After hours and weekend coverage [phone]   88 y/o F PMH of COPD, HTN, HLD, hypothyroidism, TIA, trigeminal neuralgia, T2DM, and dementia who was BIBEMS from [employer] to the ED with an unwitnessed fall  Anemia - without overt/brisk GI bleed; brisk Hgb response to 2 units PRBCs 12/18, Hgb stable  CT CAP - without evidence of bleed    Found to have large amount of stool in sigmoid and rectum on CT in ED 12/18- reported fecal disimpaction and enema in ER with passage of stool.    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 on 12/21 clinical exam - improved after MARSHALL  -replete lytes to goal K 4-4.5 and Mg >2  -Synthroid management per primary team (severe hypothyroid state can decrease GI motility)  -bowel regimen: Miralax, Senna and Dulcolax supp HS  -monitor clinically  -ok for PO diet as tolerated, add supplements    Anemia - without overt/brisk GI bleed; Hgb stable after transfusions  B12 and Folate WNL  Iron studies npt c/w iron deficiency  -PO Iron supplements as ordered  -add PPI (PO) given CT findings suggestive of reflux esophagitis  -would not pursue invasive GI work-up given no overt GI bleeding and pt's severe dementia    Discussed with Medicine ACP    Thank you for the courtesy of this consult.    Claus Klein PA-C    [facility]  [phone]  After hours and weekend coverage [phone]

## 2021-12-21 NOTE — DIETITIAN INITIAL EVALUATION ADULT. - PERTINENT LABORATORY DATA
(12/02) HbA1c 5.6%, cholesterol 200, ; Finger sticks: (12/21) 106 - 113 (12/20) none (12/19) 114 - 129; (12/21) Na 147, K+ 2.8, BUN 27,

## 2021-12-21 NOTE — DIETITIAN INITIAL EVALUATION ADULT. - REASON INDICATOR FOR ASSESSMENT
Nutrition consult received for assessment.  Information obtained from: medical record and PCA.   Pt confused/disoriented as per documentation, unwilling to participate. Unable to reach reference contact - limited information obtained.

## 2021-12-21 NOTE — DIETITIAN INITIAL EVALUATION ADULT. - OTHER INFO
PCA reports pt with poor appetite and PO intake, states pt is refusing to eat or drink anything. PCA unable to assess if pt is tolerating diet/fluid consistencies due to not eating. Denies pt with nausea or vomiting. Reports severe constipation S/P enema, reports last BM 4-5 days ago.      Unable to obtain accurate weight history/changes PTA at this time. Weight as per [facility] includes the [facility] HIE (12/26/2021) 124.7 pounds. Weight as per flow sheets (12/18) 117 pounds. Obtained bed weight (12/21) 112.4 pounds - suggests 12.3 pounds weight loss x ~2 months. PCA reports pt with poor appetite and PO intake, states pt is refusing to eat or drink anything. PCA unable to assess if pt is tolerating diet/fluid consistencies due to not eating. Denies pt with nausea or vomiting. Reports severe constipation S/P enema, reports last BM 4-5 days ago.      Unable to obtain accurate weight history/changes PTA at this time. Weight as per [facility] HIE (10/26/2021) 124.7 pounds. Weight as per flow sheets (12/18) 117 pounds. Obtained bed weight (12/21) 112.4 pounds - suggests 12.3 pounds weight loss x ~2 months.

## 2021-12-21 NOTE — CONSULT NOTE ADULT - SUBJECTIVE AND OBJECTIVE BOX
Patient is a 89y old  Female who presents with a chief complaint of Pt 89 y/o F with PMH as per chart: COPD, HTN, HLD, hypothyroidism, TIA, trigeminal neuralgia, T2DM, dementia, who was BIBEMS from [facility] to the ED with an unwitnessed fall, found with acute metabolic encephalopathy 2/2 constipation, ANGELICA, severely anemic, refusing to open oral cavity when presented with PO via cup and spoon on swallow evaluation attempt (12/20). (21 Dec 2021 09:17)      HPI:  89 y/o F PMH of COPD, HTN, HLD, hypothyroidism, TIA, trigeminal neuralgia, T2DM, and dementia who was BIBEMS from [facility] to the ED with an unwitnessed fall. Reached out to [facility] for a callback for additional information. History obtained per chart documentation. Unable to obtain history given pt's mental status. Per EMS report, pt had received her home BP medications.  She was recently admitted at [facility] and discharged on 12/2 admitted then for AMS, found on imaging to have occlusions/stenosis of L posterior cerebral a, seen by neurology. She was noted to have HTN urgency and medications titrated.    In the ED, intially hypotensive SBP 80s on admission, s/p 1 L IVF with improvement in SBP to 120s. Afebrile.  Medications received: zosyn iv, LR 1L (17 Dec 2021 12:47)    Found to have large amount of stool in sigmoid and rectum on CT in ED - reported fecal disimpaction in ER     PAST MEDICAL & SURGICAL HISTORY:  Hypertension    Transient ischemic attack (TIA)    Chronic obstructive pulmonary disease (COPD)    Dementia    T2DM (type 2 diabetes mellitus)    HLD (hyperlipidemia)    Hypothyroidism    Trigeminal neuralgia    No significant past surgical history        Allergies  Ambien (Unknown)  aspirin (Unknown)  sulfa drugs (Unknown)      MEDICATIONS  (STANDING):  acetaminophen     Tablet .. 650 milliGRAM(s) Oral every 6 hours  amLODIPine   Tablet 10 milliGRAM(s) Oral daily  atorvastatin 40 milliGRAM(s) Oral at bedtime  bisacodyl Suppository 10 milliGRAM(s) Rectal daily  carBAMazepine 200 milliGRAM(s) Oral three times a day  cholecalciferol 2000 Unit(s) Oral daily  dextrose 5% + sodium chloride 0.45% with potassium chloride 20 mEq/L 1000 milliLiter(s) (75 mL/Hr) IV Continuous <Continuous>  ferrous    sulfate 325 milliGRAM(s) Oral daily  heparin   Injectable 5000 Unit(s) SubCutaneous every 12 hours  lactated ringers. 1000 milliLiter(s) (50 mL/Hr) IV Continuous <Continuous>  levothyroxine 125 MICROGram(s) Oral daily  losartan 25 milliGRAM(s) Oral daily  multivitamin 1 Tablet(s) Oral daily  polyethylene glycol 3350 17 Gram(s) Oral two times a day  potassium chloride  10 mEq/100 mL IVPB 10 milliEquivalent(s) IV Intermittent every 1 hour  senna 2 Tablet(s) Oral at bedtime    MEDICATIONS  (PRN):  melatonin 3 milliGRAM(s) Oral at bedtime PRN Insomnia      Social History:  [facility] resident      Family History  UNABLE TO OBTAIN  IBD (  ) Yes   (  ) No  GI Malignancy (  )  Yes    (  ) No      Advanced Directives: (     ) None    (   X   ) DNR    ( X    ) DNI    (     ) Health Care Proxy:     Review of Systems:    unable to obtain from pt 2/2 dementia      Vital Signs Last 24 Hrs  T(C): 37.1 (21 Dec 2021 04:59), Max: 37.1 (21 Dec 2021 04:59)  T(F): 98.8 (21 Dec 2021 04:59), Max: 98.8 (21 Dec 2021 04:59)  HR: 85 (21 Dec 2021 04:59) (85 - 93)  BP: 167/72 (21 Dec 2021 04:59) (163/93 - 175/90)  BP(mean): --  RR: 17 (21 Dec 2021 04:59) (17 - 18)  SpO2: 95% (21 Dec 2021 04:59) (95% - 96%)    PHYSICAL EXAM:    Constitutional: NAD, elderly female  chronically ill appearing  pt intermittently combative/fiesty during exam and attempted interview.  PCA at bedside for exam/assistance  Neck: No LAD, supple no JVD  Respiratory: grossly clear, no accessory muscle use  Cardiovascular: S1 and S2, RRR, no M  Gastrointestinal: BS+, soft, NT/ND, neg HSM,  Extremities: No peripheral edema, neg clubing, cyanosis  Vascular: 2+ peripheral pulses  Neurological: A/O x 3, no focal deficits  Psychiatric: Normal mood, normal affect  Skin: No rashes        LABS:                        9.6    9.80  )-----------( 241      ( 21 Dec 2021 07:11 )             29.5     12-21    148<H>  |  110<H>  |  26<H>  ----------------------------<  106<H>  2.7<LL>   |  24  |  0.59    Ca    8.1<L>      21 Dec 2021 09:00  Phos  4.1     12-21  Mg     2.0     12-21    TPro  5.7<L>  /  Alb  3.6  /  TBili  0.3  /  DBili  x   /  AST  29  /  ALT  18  /  AlkPhos  62  12-20          RADIOLOGY & ADDITIONAL TESTS:    ACC: [id number] EXAM:  XR ABDOMEN PORTABLE URGENT 1V                        PROCEDURE DATE:  12/20/2021        INTERPRETATION:  CLINICAL INDICATION: Constipation    TECHNIQUE: One view of the abdomen.    COMPARISON: CT abdomen pelvis 12/18/2021, abdominal x-ray dated 12/1/2021    FINDINGS:  Mildly distended loops of air-filled large bowel. These appear decreased   in caliber when compared to the  radiograph from 12/18/2021.   Moderate stool throughout the colon.    Visualized osseous structures are unremarkable.    IMPRESSION:  Multiple distended loops of air-filled large bowel which appear decreased   in caliber compared to the study radiograph. This may represent a   resolving ileus or distal obstruction.    --- End of Report ---      ACC: [id number] EXAM:  CT ABDOMEN AND PELVIS IC                        ACC: [id number] EXAM:  CT CHEST IC                          PROCEDURE DATE:  12/18/2021      INTERPRETATION:  CLINICAL INFORMATION: Status post fall. Anemia.    COMPARISON: Contrast enhanced CT of the abdomen and pelvis performed one   day earlier, noncontrast CT of the thorax dated December 24, 2020.    CONTRAST/COMPLICATIONS:  IV Contrast: Omnipaque 350 (accession [id number]), IV contrast documented   in associated exam (accession [id number])  90 cc administered   10 cc   discarded  Oral Contrast: NONE  Complications: None reported at time of study completion    PROCEDURE:  CT of the Chest, Abdomen and Pelvis was performed.  Precontrast and delayed portal venous phase imaging was performed through   the abdomen and pelvis.  Sagittal and coronal reformats were performed.    FINDINGS:  CHEST:  LUNGS AND LARGE AIRWAYS: Patent central airways. Stable trace right   apical scarring. Trace subpleural atelectasis in the bilateral lower   lobes. No new pulmonary nodule or confluent airspace opacity is   identified.  PLEURA: No pleural effusion.  VESSELS: The right internal jugular vein is atretic. The thoracic aorta   and great vessels are unremarkable.  HEART: Heart size is normal. No pericardial effusion.  MEDIASTINUM AND SIL: No lymphadenopathy. There is mild concentric wall   thickening of the esophagus. There is a moderate sliding hiatal hernia.  CHEST WALL AND LOWER NECK: Within normal limits.    ABDOMEN AND PELVIS:  LIVER: Within normal limits.  BILE DUCTS: Normal caliber.  GALLBLADDER: Within normal limits.  SPLEEN: Within normal limits.  PANCREAS: Within normal limits.  ADRENALS: Within normal limits.  KIDNEYS/URETERS: Small bilateral renal cysts. 5 x 2 mmnonobstructing   right renal midpole stone. Otherwise, within normal limits.    BLADDER: The bladder is distended with excreted contrast and smooth in   contour.  REPRODUCTIVE ORGANS:    BOWEL: Again is noted large stool in the sigmoid colon and rectum. There   is no evidence of bowel obstruction. There is no stranding of the   mesenteric fat. The appendix is unremarkable. There is no mesenteric   adenopathy. There is no evidence of active intraluminal extravasation of   contrast.  PERITONEUM: Noascites. There is no evidence of hyperattenuating   collection within the peritoneal cavity or retroperitoneum to suggest   acute hemorrhage.  VESSELS: Extensive atherosclerotic disease of the nonaneurysmal abdominal   aorta and iliac arteries.  RETROPERITONEUM/LYMPH NODES: No lymphadenopathy.  ABDOMINAL WALL: Within normal limits.  BONES: Moderate degenerative changes of the bilateral shoulders. Mild   anterolisthesis of L4 on L5. Healed sternal fracture.    IMPRESSION: No evidence of acute hemorrhage in the thorax, abdomen, and   pelvis. No evidence of malignancy in the thorax, abdomen, and pelvis.   Mild diffuse esophageal thickening with moderate hiatal hernia, which may   represent reflux esophagitis. Large stool in the sigmoid colon andrectum.    --- End of Report ---         Patient is a 89y old  Female who presents with a chief complaint of Pt 88 y/o F with PMH as per chart: COPD, HTN, HLD, hypothyroidism, TIA, trigeminal neuralgia, T2DM, dementia, who was BIBEMS from [facility] to the ED with an unwitnessed fall, found with acute metabolic encephalopathy 2/2 constipation, ANGELICA, severely anemic, refusing to open oral cavity when presented with PO via cup and spoon on swallow evaluation attempt (12/20). (21 Dec 2021 09:17)      HPI:  88 y/o F PMH of COPD, HTN, HLD, hypothyroidism, TIA, trigeminal neuralgia, T2DM, and dementia who was BIBEMS from [facility] to the ED with an unwitnessed fall. Reached out to [facility] for a callback for additional information. History obtained per chart documentation. Unable to obtain history given pt's mental status. Per EMS report, pt had received her home BP medications.  She was recently admitted at [facility] and discharged on 12/2 admitted then for AMS, found on imaging to have occlusions/stenosis of L posterior cerebral a, seen by neurology. She was noted to have HTN urgency and medications titrated.    In the ED, initially hypotensive SBP 80s on admission, s/p 1 L IVF with improvement in SBP to 120s. Afebrile.  Medications received: zosyn iv, LR 1L (17 Dec 2021 12:47)    Found to have large amount of stool in sigmoid and rectum on CT in ED 12/18- reported fecal disimpaction and enema in ER with passage of stool.    Follow up AXR 12/20 with air filled loops of large bowel - decreased in caliber c/w previous imaging.  Received enema RI x 2 and suppository with no reported passage of stool yesterday/overnight.    No nausea or vomiting  +abdominal distension  +dementia with intermittently combative/aggressive behavior  Pt reported to be refusing PO - seen by SLP - refused to open mouth for bedside assessment      PAST MEDICAL & SURGICAL HISTORY:  Hypertension    Transient ischemic attack (TIA)    Chronic obstructive pulmonary disease (COPD)    Dementia    T2DM (type 2 diabetes mellitus)    HLD (hyperlipidemia)    Hypothyroidism    Trigeminal neuralgia    No significant past surgical history        Allergies  Ambien (Unknown)  aspirin (Unknown)  sulfa drugs (Unknown)      MEDICATIONS  (STANDING):  acetaminophen     Tablet .. 650 milliGRAM(s) Oral every 6 hours  amLODIPine   Tablet 10 milliGRAM(s) Oral daily  atorvastatin 40 milliGRAM(s) Oral at bedtime  bisacodyl Suppository 10 milliGRAM(s) Rectal daily  carBAMazepine 200 milliGRAM(s) Oral three times a day  cholecalciferol 2000 Unit(s) Oral daily  dextrose 5% + sodium chloride 0.45% with potassium chloride 20 mEq/L 1000 milliLiter(s) (75 mL/Hr) IV Continuous <Continuous>  ferrous    sulfate 325 milliGRAM(s) Oral daily  heparin   Injectable 5000 Unit(s) SubCutaneous every 12 hours  lactated ringers. 1000 milliLiter(s) (50 mL/Hr) IV Continuous <Continuous>  levothyroxine 125 MICROGram(s) Oral daily  losartan 25 milliGRAM(s) Oral daily  multivitamin 1 Tablet(s) Oral daily  polyethylene glycol 3350 17 Gram(s) Oral two times a day  potassium chloride  10 mEq/100 mL IVPB 10 milliEquivalent(s) IV Intermittent every 1 hour  senna 2 Tablet(s) Oral at bedtime    MEDICATIONS  (PRN):  melatonin 3 milliGRAM(s) Oral at bedtime PRN Insomnia      Social History:  [facility] resident      Family History  UNABLE TO OBTAIN  IBD (  ) Yes   (  ) No  GI Malignancy (  )  Yes    (  ) No      Advanced Directives: (     ) None    (   X   ) DNR    ( X    ) DNI    (     ) Health Care Proxy:     Review of Systems:    unable to obtain from pt 2/2 dementia      Vital Signs Last 24 Hrs  T(C): 37.1 (21 Dec 2021 04:59), Max: 37.1 (21 Dec 2021 04:59)  T(F): 98.8 (21 Dec 2021 04:59), Max: 98.8 (21 Dec 2021 04:59)  HR: 85 (21 Dec 2021 04:59) (85 - 93)  BP: 167/72 (21 Dec 2021 04:59) (163/93 - 175/90)  BP(mean): --  RR: 17 (21 Dec 2021 04:59) (17 - 18)  SpO2: 95% (21 Dec 2021 04:59) (95% - 96%)    PHYSICAL EXAM:    Constitutional: NAD, elderly female  chronically ill appearing  pt intermittently combative/fiesty during exam and attempted interview.  PCA at bedside for exam/assistance  Neck: No LAD, supple no JVD  Respiratory: grossly clear, no accessory muscle use  Cardiovascular: S1 and S2, RRR, no M  Gastrointestinal: BS+, softly distended NT   Rectal: tight rectal tone  air filled rectal vault without fecal impaction  or palpable mass/lesion +++passage of air and liquid dark green-brown stool with MARSHALL  repeat MARSHALL - collapsed rectal vault  Follow up Abdominal exam post MARSHALL - softer, less distended +BS  Extremities: No peripheral edema, neg clubbing, cyanosis  Vascular: 2+ peripheral pulses  Neurological: oriented to self   moves all extremities.  no focal asymmetry  Psychiatric: agitated, intermittently yelling and combative  Skin: No rashes, fragile skin with areas of healing bruises        LABS:                        9.6    9.80  )-----------( 241      ( 21 Dec 2021 07:11 )             29.5   Hemoglobin: 9.9 g/dL (12.20.21 @ 07:07)   Hemoglobin: 9.8 g/dL (12.19.21 @ 07:30)   Hemoglobin: 9.3 g/dL (12.18.21 @ 22:42)   s/p 2 units PRBCs transfused  Hemoglobin: 6.9 g/dL (12.18.21 @ 10:09)     12-21    148<H>  |  110<H>  |  26<H>  ----------------------------<  106<H>  2.7<LL>   |  24  |  0.59    Ca    8.1<L>      21 Dec 2021 09:00  Phos  4.1     12-21  Mg     2.0     12-21    TPro  5.7<L>  /  Alb  3.6  /  TBili  0.3  /  DBili  x   /  AST  29  /  ALT  18  /  AlkPhos  62  12-20    Folate, Serum: 14.1 ng/mL (12.18.21 @ 11:00)   Vitamin B12, Serum: 653 pg/mL (12.18.21 @ 11:00)   Iron with Total Binding Capacity in AM (12.18.21 @ 10:37)   % Saturation, Iron: 45 %   Iron - Total Binding Capacity.: 206 ug/dL   Iron Total, Serum: 94 ug/dL   Unsaturated Iron Binding Capacity: 112 ug/dL     Culture - Blood (12.17.21 @ 16:25)   Specimen Source: .Blood Blood-Peripheral   Culture Results:   No growth to date.   Culture - Blood (12.17.21 @ 16:25)   Specimen Source: .Blood Blood-Peripheral   Culture Results:   No growth to date.   Culture - Urine (12.17.21 @ 13:09)   Specimen Source: Clean Catch Clean Catch (Midstream)   Culture Results:   No growth   COVID-19 PCR . (12.17.21 @ 07:51)   COVID-19 PCR: NotDetec    Thyroid Stimulating Hormone, Serum: 12.60 uIU/mL (11.29.21 @ 08:54)     RADIOLOGY & ADDITIONAL TESTS:    ACC: [id number] EXAM:  XR ABDOMEN PORTABLE URGENT 1V                        PROCEDURE DATE:  12/20/2021        INTERPRETATION:  CLINICAL INDICATION: Constipation    TECHNIQUE: One view of the abdomen.    COMPARISON: CT abdomen pelvis 12/18/2021, abdominal x-ray dated 12/1/2021    FINDINGS:  Mildly distended loops of air-filled large bowel. These appear decreased   in caliber when compared to the  radiograph from 12/18/2021.   Moderate stool throughout the colon.    Visualized osseous structures are unremarkable.    IMPRESSION:  Multiple distended loops of air-filled large bowel which appear decreased   in caliber compared to the study radiograph. This may represent a   resolving ileus or distal obstruction.    --- End of Report ---      ACC: [id number] EXAM:  CT ABDOMEN AND PELVIS IC                        ACC: [id number] EXAM:  CT CHEST IC                          PROCEDURE DATE:  12/18/2021      INTERPRETATION:  CLINICAL INFORMATION: Status post fall. Anemia.    COMPARISON: Contrast enhanced CT of the abdomen and pelvis performed one   day earlier, noncontrast CT of the thorax dated December 24, 2020.    CONTRAST/COMPLICATIONS:  IV Contrast: Omnipaque 350 (accession [id number]), IV contrast documented   in associated exam (accession [id number])  90 cc administered   10 cc   discarded  Oral Contrast: NONE  Complications: None reported at time of study completion    PROCEDURE:  CT of the Chest, Abdomen and Pelvis was performed.  Precontrast and delayed portal venous phase imaging was performed through   the abdomen and pelvis.  Sagittal and coronal reformats were performed.    FINDINGS:  CHEST:  LUNGS AND LARGE AIRWAYS: Patent central airways. Stable trace right   apical scarring. Trace subpleural atelectasis in the bilateral lower   lobes. No new pulmonary nodule or confluent airspace opacity is   identified.  PLEURA: No pleural effusion.  VESSELS: The right internal jugular vein is atretic. The thoracic aorta   and great vessels are unremarkable.  HEART: Heart size is normal. No pericardial effusion.  MEDIASTINUM AND SIL: No lymphadenopathy. There is mild concentric wall   thickening of the esophagus. There is a moderate sliding hiatal hernia.  CHEST WALL AND LOWER NECK: Within normal limits.    ABDOMEN AND PELVIS:  LIVER: Within normal limits.  BILE DUCTS: Normal caliber.  GALLBLADDER: Within normal limits.  SPLEEN: Within normal limits.  PANCREAS: Within normal limits.  ADRENALS: Within normal limits.  KIDNEYS/URETERS: Small bilateral renal cysts. 5 x 2 mmnonobstructing   right renal midpole stone. Otherwise, within normal limits.    BLADDER: The bladder is distended with excreted contrast and smooth in   contour.  REPRODUCTIVE ORGANS:    BOWEL: Again is noted large stool in the sigmoid colon and rectum. There   is no evidence of bowel obstruction. There is no stranding of the   mesenteric fat. The appendix is unremarkable. There is no mesenteric   adenopathy. There is no evidence of active intraluminal extravasation of   contrast.  PERITONEUM: Noascites. There is no evidence of hyperattenuating   collection within the peritoneal cavity or retroperitoneum to suggest   acute hemorrhage.  VESSELS: Extensive atherosclerotic disease of the nonaneurysmal abdominal   aorta and iliac arteries.  RETROPERITONEUM/LYMPH NODES: No lymphadenopathy.  ABDOMINAL WALL: Within normal limits.  BONES: Moderate degenerative changes of the bilateral shoulders. Mild   anterolisthesis of L4 on L5. Healed sternal fracture.    IMPRESSION: No evidence of acute hemorrhage in the thorax, abdomen, and   pelvis. No evidence of malignancy in the thorax, abdomen, and pelvis.   Mild diffuse esophageal thickening with moderate hiatal hernia, which may   represent reflux esophagitis. Large stool in the sigmoid colon andrectum.    --- End of Report ---

## 2021-12-21 NOTE — DIETITIAN INITIAL EVALUATION ADULT. - DIET TYPE
Defer diet/fluid consistencies to medical team/SLP recommendations. 1. Recommend regular diet to allow pt more food options and optimize PO intake. 2. Consider Glucerna Shake 240mls 1x daily (220kcals, 10g protein) to optimize kcal and protein intake (increase bottles/day if pt drinks it). Spoke to NP.

## 2021-12-21 NOTE — SWALLOW BEDSIDE ASSESSMENT ADULT - COMMENTS
Per RN note 12/20 "patient does note take PO medications despite multiple times of attempt. does not eat. patient would not open her mouth for PO intakes."
Per RN note  "patient does note take PO medications despite multiple times of attempt. does not eat. patient would not open her mouth for PO intakes."    Swallow History: On this admission (), attempted to see pt for bedside swallow evaluation, but pt adamantly refused stating "no" repetitively and refusing to open oral cavity when presented with PO via cup and spoon.    IMAGIN/17 CXR: No active disease.  : CT Chest: No evidence of acute hemorrhage in the thorax, abdomen, and pelvis. No evidence of malignancy in the thorax, abdomen, and pelvis. Mild diffuse esophageal thickening with moderate hiatal hernia, which may represent reflux esophagitis. Large stool in the sigmoid colon and rectum.

## 2021-12-21 NOTE — SWALLOW BEDSIDE ASSESSMENT ADULT - SLP PERTINENT HISTORY OF CURRENT PROBLEM
89 y/o F PMH of COPD, HTN, HLD, hypothyroidism, TIA, trigeminal neuralgia, T2DM, and dementia [facility]ed [facility] adm with acute metabolic encephalopathy 2/2 constipation, ANGELICA, and further workup for falls.
88 y/o F PMH of COPD, HTN, HLD, hypothyroidism, TIA, trigeminal neuralgia, T2DM, and dementia who was BIBEMS from [facility] to the ED with an unwitnessed fall adm with acute metabolic encephalopathy 2/2 constipation, ANGELICA, and further workup for falls.

## 2021-12-21 NOTE — PROVIDER CONTACT NOTE (CRITICAL VALUE NOTIFICATION) - SITUATION
"                  Telemedicine Visit: The patient's condition can be safely assessed and treated via synchronous audio and visual telemedicine encounter.       Reason for Telemedicine Visit: Patient has requested telehealth visit     Originating Site (Patient Location): Patient's home     Distant Site (Provider Location): Provider Remote Setting     Consent:  The patient/guardian has verbally consented to: the potential risks and benefits of telemedicine (video visit) versus in person care; bill my insurance or make self-payment for services provided; and responsibility for payment of non-covered services                             Mode of Communication:  Video Conference via [employer]  As the provider I attest to compliance with applicable laws and regulations related to telemedicine.    Progress Note    Client Name: Carrillo Dunlap  Date: 9.21.20         Service Type: Individual  Video Visit: yes     Session Start Time:  10:00am  Session End Time: 11:00am    Session Length: 60 minutes    Session #: 30    Attendees: Client attended alone     Treatment Plan Last Reviewed: Reviewed  PHQ-9 / UGO-7 : Reviewed    DATA  Interactive Complexity: No  Crisis: No       Progress Since Last Session (Related to Symptoms / Goals / Homework):  Symptoms: Improving.    Homework: Achieved / completed to satisfaction.      Episode of Care Goals: Achieved / completed to satisfaction - ACTION (Actively working towards change); Intervened by reinforcing change plan / affirming steps taken     Current / Ongoing Stressors and Concerns: Pt reports being able to notice when \"I am going above what I need to do.\" He reflected how it was difficult to notice and also difficult to get himself out of the perspective. He reflected on how he often \"goes to priority number 5 rather than started at 1-4.\" Discussed how pt often finds himself creating to many lists, often rewriting lists which \"I don't really need to do.\" Discussed how this may "
K 2.9
patient's repeat lab shows K is 2.7,
"be related to a fear of committing to a task and \"not doing it right.\" Processed how pt brought to awareness his ability to \"think on his feat and overcome when he has not created lists or prepared extensively. Encouraged pt to open up and seek examples in his daily life where he did not have to prepare to come up with a more realistic belief of what situations he may need to prepare and what situations he may not need to prepare.        Treatment Objective(s) Addressed in This Session:   Identify negative self-talk and behaviors: challenge core beliefs, myths, and actions   Decrease frequency and intensity of feeling down, depressed, hopeless   Client will review and familiarize himself with feeling words. He will use feeling words to describe when her needs are met/not met.      Intervention:  Psychoeducation: Explored with the pt using open ended questioning what the purpose of worry is. Discussed how in order to worry excessively about something it has to be 1. Important to us; 2. We have to be invested in the outcome (we have a preferred outcome). Processed how our minds continue to ruminate on a worry because of the fear of the unknown or uncertain. Our mind searches for different possibilities, in part to ensure that we are capable to cope with the various outcomes, especially if they may cause more pain/suffering in the long run. Discussed how our preferred outcome is the least suffering/ easiest option. Processed however, that many times we are able to cope with the other outcomes, although it is more difficult and there may be more suffering/pain. Pt processed that although this may be the cause, it can help him find resilience in himself and he may grow as a person.       ASSESSMENT: Current Emotional / Mental Status (status of significant symptoms):   Risk status (Self / Other harm or suicidal ideation)   Client denies current fears or concerns for personal safety.   Client denies current or recent "
Patient presents with Alcohol withdrawal. On CIWA precautions.
suicidal ideation or behaviors.   Client denies current or recent homicidal ideation or behaviors.   Client denies current or recent self injurious behavior or ideation.   Client denies other safety concerns.    Client reports there has been no change in risk factors since their last session.      Client reports there has been no change in protective factors since their last session.      A safety and risk management has not been developed at this time.  Client was given the Suicide Prevention National Hotline, Text [place] 049959,  the [facility] Crisis Number, or encouraged to Call 911 should there be a change  in these risk factors.       Appearance:   Appropriate    Eye Contact:   Good    Psychomotor Behavior: Restless , more so than in previous sessions   Attitude:   Cooperative    Orientation:   All   Speech    Rate / Production: Hyperverbal     Volume:  Normal    Mood:    Anxious    Affect:    Appropriate    Thought Content:  Clear , more clear than past sessions   Thought Form:  Coherent    Insight:    Fair, improving     Medication Review:   No changes to current psychiatric medication(s)     Medication Compliance:   Yes     Changes in Health Issues:   None reported     Chemical Use Review:  Substance Use: decrease in cannabis.  Client reports frequency using of 0 in the past two weeks.    Tobacco Use: No current tobacco use.      Diagnosis:  1. ADHD (attention deficit hyperactivity disorder), inattentive type    2. Generalized anxiety disorder    3. Moderate major depression (H)      Collateral Reports Completed:   Not Applicable    PLAN: (Client Tasks / Therapist Tasks / Other). open up and seek examples in his daily life where he did not have to prepare to come up with a more realistic belief of what situations he may need to prepare and what situations he may not need to prepare.     AFSHIN Gomez, [facility] 9.21.20   Note reviewed and clinical supervision by AFSHIN Leavitt [facility] 9/21/2020 
  ___________________________________________________________________________    Treatment Plan     Client's Name: Carrillo Dunlap                   YOB: 1966     Date: 1/21/19; reviewed: 5/15/19; updated 8/13/19; 1.3.20 ; 6.15.20; 9.21.20     DSM-V Diagnoses: Attention-Deficit/Hyperactivity Disorder  314.01 (F90.2) Combined presentation or 296.31 (F33.0) Major Depressive Disorder, Recurrent Episode, Mild _ and With anxious distress  Psychosocial / Contextual Factors: Limited social support, limited financial support, physical/medical concerns, employment constraints  WHODAS: did not complete during visit     Referral / Collaboration:  Referral to another professional/service is not indicated at this time.     Anticipated number of session or this episode of care: 15      MeasurableTreatment Goal(s) related to diagnosis / functional impairment(s)  Goal 1: Client will begin to work on his work portfolio.     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       Objective #A (Client Action)                Client will schedule times of the day to work on his work portfolio. 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   Status: completed - Date: 5/15/19      Intervention(s)  Therapist will assign homework including weekly activity scheduling and CBT thought logs  provide educational materials on CBT  role-play effective communication skills.    Goal 2: Client will decrease his monthly marijuana use.     I will know I've met my goal when I do not use marijuana around my brother.      Objective #A (Client Action)    Client will identify at least 3 example(s) of how marijuana has resulted in an experience that interferes with person values or goals.  Status: completed- 5/15/19    Intervention(s)  Therapist will help client identify how his personal values and 
goals are interfered when he uses marijuana.     Objective #B  Client will identify triggers and environmental cues contributing to his marijuana usage.     Status: completed - Date: 5/15/19     Intervention(s)  Therapist will teach the client how to perform a behavioral chain analysis to better identify triggers and cues to using marijuana.     Objective #C  Client will maintain sobriety for one month.  Status: completed - Date: 5.15.19     Intervention(s)  Therapist will provide educational materials for the client to increase his coping skills when he has urges to use marijuana. Therapist will provide additional resources to help him maintain sobriety.     Goal 3: Client will decrease his anxiety from his baseline GAD7 score.    I will know I've met my goal when I am able to let things go and allow myself permission to relax and not worry.      Objective #A  Client will identify 5 initial signs or symptoms of anxiety.    Status: completed - Date: 5/15/19     Intervention(s)  Therapist will provide educational materials on the symptoms of anxiety.    Objective #B (Client Action)    Status: completed - Date: 5/15/19     Client will identify 5 fears / thoughts that contribute to feeling anxious.    Intervention(s)  Therapist will teach the client how to perform a behavioral chain analysis in order to identify fears/thoughts contributing to anxious feelings.     Objective #C  Client will use at least 4 coping skills for anxiety management in the next 10 weeks.  Status: continued - Date: 2/19/19; 1.3.20; 6.15.20 ; 9.21.20    Intervention(s)  Therapist will teach progressive muscle relaxation, cue control relaxation, mindful breathing, and guided imagery. Therapist will also utilize Acceptance and Commitment Therapy by exploring and identifying important values in patient's life, and discuss ways to commit to behavioral activation around these values.     Goal 4: The client will learn and apply skills to manage the 
Pt present with unwitnessed fall.
symptoms of depression.    I will know I've met my goal when I am able to let things go and allow myself permission to relax, not worry, and feel better about myself.      Objective #A: Client will Increase interest, engagement, and pleasure in doing things.  Client will identify negative self-talk and behaviors: challenge core beliefs, myths, and actions.  Decrease frequency and intensity of feeling down, depressed, hopeless.  Status: continued Date(s): 5/15/19; 6.15.20; 9.21.20    Interventions  Therapist will provide educational materials and handouts on core beliefs, cognitive distortions, and cognitive fusion and defusion.    Goal 5: Client will explore and resolve issues related to relationship issues with his boss.    I will know I've met my goal when I am able to feel better about my relationship with his boss.      Objective #A (Client Action)    Client will review and familiarize himself with feeling words. He will use feeling words to describe when her needs are met/not met.   Status: New - Date: 8/13/19; 1.3.20 ; 6.15.20; 9.21.20    Intervention(s)  Therapist will assign emotional recognition/identification including when expressing when his needs are not being met or are being met.    Objective #B  Client will identify negative self-talk and behaviors associated with his relationship with his boss and others, and challenge core beliefs, myths, and actions.  Status: 8/13/19; 1.3.20 ; 6.15.20; 9.21.20    Intervention(s)  Therapist will provide education and homework on CBT, ACT, and DBT. Therapist will provide strategies to evaluate for cognitive distortions and logical errors. Therapist will also help client begin to accept feeling negative emotions in a way that helps to decrease his suffering.      Objective #C   Client will compile a list of boundaries she will set with his boss and others.   Status: New - Date: 8/13/19; 1.3.20 ; 6.15.20; 9.21.20    Intervention(s)  Therapist will teach about healthy 
boundaries. Including information about different communication styles emphasizing assertive communication as the most helpful/healthy style.        AFSHIN Gomez, SW, May 15, 2019; 1.3.20 ; 6.15.20; 9.21.20  Note reviewed and clinical supervision by AFSHIN Weaver [facility] 9/21/2020

## 2021-12-21 NOTE — CONSULT NOTE ADULT - PROBLEM SELECTOR RECOMMENDATION 4
family interested in return to [facility] with hospice services see above GOC note  family in agreement with hospice referral  CM notified

## 2021-12-21 NOTE — PROVIDER CONTACT NOTE (CRITICAL VALUE NOTIFICATION) - BACKGROUND
Patient presents with continuous alcohol withdrawal
Patient with history of dementia, htn, and diabetes type 2
s/p 6 rounds ivpb KCl today. went from 2.8-2.9
K was 2.7 at 9AM and patient received 10meq KCL IV x3.
previous AM lab shows K 2.9.

## 2021-12-21 NOTE — DIETITIAN INITIAL EVALUATION ADULT. - PERTINENT MEDS FT
Lactated ringers, Lipitor, Dulcolax, Multivitamin, Miralax, Vitamin D3, Ferrous Sulfate, Synthroid, Senna

## 2021-12-21 NOTE — CONSULT NOTE ADULT - ATTENDING COMMENTS
Alana Silverio MD, FACP, FACG, AGAF  [facility]  [phone]     After hours and weekend coverage GI service : [phone]

## 2021-12-22 LAB
ANION GAP SERPL CALC-SCNC: 13 MMOL/L — SIGNIFICANT CHANGE UP (ref 5–17)
BUN SERPL-MCNC: 22 MG/DL — SIGNIFICANT CHANGE UP (ref 7–23)
CALCIUM SERPL-MCNC: 8 MG/DL — LOW (ref 8.4–10.5)
CHLORIDE SERPL-SCNC: 110 MMOL/L — HIGH (ref 96–108)
CO2 SERPL-SCNC: 21 MMOL/L — LOW (ref 22–31)
CREAT SERPL-MCNC: 0.61 MG/DL — SIGNIFICANT CHANGE UP (ref 0.5–1.3)
CULTURE RESULTS: SIGNIFICANT CHANGE UP
CULTURE RESULTS: SIGNIFICANT CHANGE UP
FERRITIN SERPL-MCNC: 111 NG/ML — SIGNIFICANT CHANGE UP (ref 15–150)
GLUCOSE BLDC GLUCOMTR-MCNC: 108 MG/DL — HIGH (ref 70–99)
GLUCOSE BLDC GLUCOMTR-MCNC: 113 MG/DL — HIGH (ref 70–99)
GLUCOSE BLDC GLUCOMTR-MCNC: 116 MG/DL — HIGH (ref 70–99)
GLUCOSE BLDC GLUCOMTR-MCNC: 143 MG/DL — HIGH (ref 70–99)
GLUCOSE SERPL-MCNC: 126 MG/DL — HIGH (ref 70–99)
HCT VFR BLD CALC: 29.1 % — LOW (ref 34.5–45)
HGB BLD-MCNC: 9.6 G/DL — LOW (ref 11.5–15.5)
MCHC RBC-ENTMCNC: 29.4 PG — SIGNIFICANT CHANGE UP (ref 27–34)
MCHC RBC-ENTMCNC: 33 GM/DL — SIGNIFICANT CHANGE UP (ref 32–36)
MCV RBC AUTO: 89 FL — SIGNIFICANT CHANGE UP (ref 80–100)
NRBC # BLD: 0 /100 WBCS — SIGNIFICANT CHANGE UP (ref 0–0)
PLATELET # BLD AUTO: 244 K/UL — SIGNIFICANT CHANGE UP (ref 150–400)
POTASSIUM SERPL-MCNC: 3.3 MMOL/L — LOW (ref 3.5–5.3)
POTASSIUM SERPL-SCNC: 3.3 MMOL/L — LOW (ref 3.5–5.3)
RBC # BLD: 3.27 M/UL — LOW (ref 3.8–5.2)
RBC # FLD: 18.6 % — HIGH (ref 10.3–14.5)
SARS-COV-2 RNA SPEC QL NAA+PROBE: SIGNIFICANT CHANGE UP
SODIUM SERPL-SCNC: 144 MMOL/L — SIGNIFICANT CHANGE UP (ref 135–145)
SPECIMEN SOURCE: SIGNIFICANT CHANGE UP
SPECIMEN SOURCE: SIGNIFICANT CHANGE UP
WBC # BLD: 9.7 K/UL — SIGNIFICANT CHANGE UP (ref 3.8–10.5)
WBC # FLD AUTO: 9.7 K/UL — SIGNIFICANT CHANGE UP (ref 3.8–10.5)

## 2021-12-22 PROCEDURE — U0003: CPT

## 2021-12-22 PROCEDURE — 93306 TTE W/DOPPLER COMPLETE: CPT

## 2021-12-22 PROCEDURE — 97530 THERAPEUTIC ACTIVITIES: CPT

## 2021-12-22 PROCEDURE — 80048 BASIC METABOLIC PNL TOTAL CA: CPT

## 2021-12-22 PROCEDURE — 84439 ASSAY OF FREE THYROXINE: CPT

## 2021-12-22 PROCEDURE — 81001 URINALYSIS AUTO W/SCOPE: CPT

## 2021-12-22 PROCEDURE — 97161 PT EVAL LOW COMPLEX 20 MIN: CPT

## 2021-12-22 PROCEDURE — 70498 CT ANGIOGRAPHY NECK: CPT | Mod: QQ

## 2021-12-22 PROCEDURE — 85025 COMPLETE CBC W/AUTO DIFF WBC: CPT

## 2021-12-22 PROCEDURE — 36415 COLL VENOUS BLD VENIPUNCTURE: CPT

## 2021-12-22 PROCEDURE — 82330 ASSAY OF CALCIUM: CPT

## 2021-12-22 PROCEDURE — U0005: CPT

## 2021-12-22 PROCEDURE — G1004: CPT

## 2021-12-22 PROCEDURE — 0042T: CPT

## 2021-12-22 PROCEDURE — 82607 VITAMIN B-12: CPT

## 2021-12-22 PROCEDURE — 99232 SBSQ HOSP IP/OBS MODERATE 35: CPT

## 2021-12-22 PROCEDURE — 74018 RADEX ABDOMEN 1 VIEW: CPT

## 2021-12-22 PROCEDURE — 85027 COMPLETE CBC AUTOMATED: CPT

## 2021-12-22 PROCEDURE — 80157 ASSAY CARBAMAZEPINE FREE: CPT

## 2021-12-22 PROCEDURE — 84443 ASSAY THYROID STIM HORMONE: CPT

## 2021-12-22 PROCEDURE — 84484 ASSAY OF TROPONIN QUANT: CPT

## 2021-12-22 PROCEDURE — 80061 LIPID PANEL: CPT

## 2021-12-22 PROCEDURE — 84132 ASSAY OF SERUM POTASSIUM: CPT

## 2021-12-22 PROCEDURE — 99291 CRITICAL CARE FIRST HOUR: CPT

## 2021-12-22 PROCEDURE — 70450 CT HEAD/BRAIN W/O DYE: CPT | Mod: ME

## 2021-12-22 PROCEDURE — 83605 ASSAY OF LACTIC ACID: CPT

## 2021-12-22 PROCEDURE — 85730 THROMBOPLASTIN TIME PARTIAL: CPT

## 2021-12-22 PROCEDURE — 82746 ASSAY OF FOLIC ACID SERUM: CPT

## 2021-12-22 PROCEDURE — 85610 PROTHROMBIN TIME: CPT

## 2021-12-22 PROCEDURE — 82435 ASSAY OF BLOOD CHLORIDE: CPT

## 2021-12-22 PROCEDURE — 82947 ASSAY GLUCOSE BLOOD QUANT: CPT

## 2021-12-22 PROCEDURE — 70496 CT ANGIOGRAPHY HEAD: CPT | Mod: MG

## 2021-12-22 PROCEDURE — 80053 COMPREHEN METABOLIC PANEL: CPT

## 2021-12-22 PROCEDURE — 83036 HEMOGLOBIN GLYCOSYLATED A1C: CPT

## 2021-12-22 PROCEDURE — 84100 ASSAY OF PHOSPHORUS: CPT

## 2021-12-22 PROCEDURE — 86769 SARS-COV-2 COVID-19 ANTIBODY: CPT

## 2021-12-22 PROCEDURE — 85018 HEMOGLOBIN: CPT

## 2021-12-22 PROCEDURE — 83735 ASSAY OF MAGNESIUM: CPT

## 2021-12-22 PROCEDURE — 84295 ASSAY OF SERUM SODIUM: CPT

## 2021-12-22 PROCEDURE — 82962 GLUCOSE BLOOD TEST: CPT

## 2021-12-22 PROCEDURE — 85014 HEMATOCRIT: CPT

## 2021-12-22 PROCEDURE — 82803 BLOOD GASES ANY COMBINATION: CPT

## 2021-12-22 RX ORDER — POTASSIUM CHLORIDE 20 MEQ
10 PACKET (EA) ORAL
Refills: 0 | Status: COMPLETED | OUTPATIENT
Start: 2021-12-22 | End: 2021-12-22

## 2021-12-22 RX ORDER — ACETAMINOPHEN 500 MG
1000 TABLET ORAL ONCE
Refills: 0 | Status: COMPLETED | OUTPATIENT
Start: 2021-12-22 | End: 2021-12-22

## 2021-12-22 RX ORDER — POTASSIUM CHLORIDE 20 MEQ
40 PACKET (EA) ORAL DAILY
Refills: 0 | Status: DISCONTINUED | OUTPATIENT
Start: 2021-12-22 | End: 2021-12-23

## 2021-12-22 RX ADMIN — HEPARIN SODIUM 5000 UNIT(S): 5000 INJECTION INTRAVENOUS; SUBCUTANEOUS at 09:28

## 2021-12-22 RX ADMIN — Medication 200 MILLIGRAM(S): at 23:42

## 2021-12-22 RX ADMIN — Medication 200 MILLIGRAM(S): at 06:19

## 2021-12-22 RX ADMIN — Medication 70 MICROGRAM(S): at 23:42

## 2021-12-22 RX ADMIN — Medication 100 MILLIEQUIVALENT(S): at 00:36

## 2021-12-22 RX ADMIN — PANTOPRAZOLE SODIUM 40 MILLIGRAM(S): 20 TABLET, DELAYED RELEASE ORAL at 11:33

## 2021-12-22 RX ADMIN — Medication 10 MILLIGRAM(S): at 11:34

## 2021-12-22 RX ADMIN — Medication 100 MILLIEQUIVALENT(S): at 14:50

## 2021-12-22 RX ADMIN — Medication 100 MILLIEQUIVALENT(S): at 03:00

## 2021-12-22 RX ADMIN — Medication 650 MILLIGRAM(S): at 23:42

## 2021-12-22 RX ADMIN — Medication 100 MILLIEQUIVALENT(S): at 11:51

## 2021-12-22 RX ADMIN — Medication 5 MILLIGRAM(S): at 23:43

## 2021-12-22 RX ADMIN — Medication 5 MILLIGRAM(S): at 06:20

## 2021-12-22 RX ADMIN — DEXTROSE MONOHYDRATE, SODIUM CHLORIDE, AND POTASSIUM CHLORIDE 75 MILLILITER(S): 50; .745; 4.5 INJECTION, SOLUTION INTRAVENOUS at 06:20

## 2021-12-22 RX ADMIN — Medication 400 MILLIGRAM(S): at 14:56

## 2021-12-22 RX ADMIN — Medication 5 MILLIGRAM(S): at 18:44

## 2021-12-22 RX ADMIN — Medication 100 MILLIEQUIVALENT(S): at 13:17

## 2021-12-22 RX ADMIN — Medication 5 MILLIGRAM(S): at 11:33

## 2021-12-22 RX ADMIN — Medication 650 MILLIGRAM(S): at 06:19

## 2021-12-22 RX ADMIN — LOSARTAN POTASSIUM 25 MILLIGRAM(S): 100 TABLET, FILM COATED ORAL at 06:19

## 2021-12-22 RX ADMIN — Medication 650 MILLIGRAM(S): at 00:33

## 2021-12-22 RX ADMIN — Medication 1000 MILLIGRAM(S): at 15:30

## 2021-12-22 RX ADMIN — POLYETHYLENE GLYCOL 3350 17 GRAM(S): 17 POWDER, FOR SOLUTION ORAL at 06:20

## 2021-12-22 RX ADMIN — Medication 100 MILLIEQUIVALENT(S): at 02:34

## 2021-12-22 RX ADMIN — Medication 650 MILLIGRAM(S): at 06:20

## 2021-12-22 NOTE — PROGRESS NOTE ADULT - SUBJECTIVE AND OBJECTIVE BOX
INTERVAL HPI/OVERNIGHT EVENTS:  4 BMs reported yesterday  no nausea or vomiting  little PO intake - pt mostly refusing; takes meds with some PO    now DNR DNI with plans for hospice care at [facility] per family wishes    MEDICATIONS  (STANDING):  acetaminophen     Tablet .. 650 milliGRAM(s) Oral every 6 hours  atorvastatin 40 milliGRAM(s) Oral at bedtime  bisacodyl Suppository 10 milliGRAM(s) Rectal daily  carBAMazepine 200 milliGRAM(s) Oral three times a day  cholecalciferol 2000 Unit(s) Oral daily  dextrose 5% + sodium chloride 0.45% with potassium chloride 20 mEq/L 1000 milliLiter(s) (75 mL/Hr) IV Continuous <Continuous>  ferrous    sulfate 325 milliGRAM(s) Oral daily  heparin   Injectable 5000 Unit(s) SubCutaneous every 12 hours  hydrALAZINE Injectable 5 milliGRAM(s) IV Push every 6 hours  levothyroxine Injectable 70 MICROGram(s) IV Push at bedtime  losartan 25 milliGRAM(s) Oral daily  multivitamin 1 Tablet(s) Oral daily  pantoprazole  Injectable 40 milliGRAM(s) IV Push daily  polyethylene glycol 3350 17 Gram(s) Oral two times a day  potassium chloride    Tablet ER 40 milliEquivalent(s) Oral daily  potassium chloride  10 mEq/100 mL IVPB 10 milliEquivalent(s) IV Intermittent every 1 hour  senna 2 Tablet(s) Oral at bedtime    MEDICATIONS  (PRN):  melatonin 3 milliGRAM(s) Oral at bedtime PRN Insomnia      Allergies  Ambien (Unknown)  aspirin (Unknown)  sulfa drugs (Unknown)      Review of Systems:  unable to obtain from pt 2/2 dementia      Vital Signs Last 24 Hrs  T(C): 36.8 (22 Dec 2021 04:46), Max: 37.1 (21 Dec 2021 21:27)  T(F): 98.2 (22 Dec 2021 04:46), Max: 98.7 (21 Dec 2021 21:27)  HR: 62 (22 Dec 2021 04:46) (62 - 93)  BP: 136/75 (22 Dec 2021 04:46) (121/65 - 200/100)  BP(mean): --  RR: 17 (22 Dec 2021 04:46) (17 - 18)  SpO2: 95% (22 Dec 2021 04:46) (95% - 97%)    PHYSICAL EXAM:  Constitutional: NAD, elderly female  chronically ill appearing  resting calmly in bed.  gives yes/no responses to questions  Neck: No LAD, supple no JVD  Respiratory: grossly clear, no accessory muscle use  Cardiovascular: S1 and S2, RRR, no M  Gastrointestinal: BS+, softly distended (decreased from previous exam)  NT   Extremities: No peripheral edema, neg clubbing, cyanosis  Vascular: 2+ peripheral pulses  Neurological: oriented to self   moves all extremities.  no focal asymmetry  Psychiatric: appears calm,  keeps eyes closed for majority of interaction/interview and exam  Skin: No rashes, fragile skin with areas of healing bruises      LABS:                        9.6    9.70  )-----------( 244      ( 22 Dec 2021 08:09 )             29.1   Hemoglobin: 9.6 g/dL (12.21.21 @ 07:11)   Hemoglobin: 9.9 g/dL (12.20.21 @ 07:07)   Hemoglobin: 9.8 g/dL (12.19.21 @ 07:30)     12-22    144  |  110<H>  |  22  ----------------------------<  126<H>  3.3<L>   |  21<L>  |  0.61    Ca    8.0<L>      22 Dec 2021 08:08  Phos  4.1     12-21  Mg     2.0     12-21      Ferritin, Serum: 111 ng/mL (12.22.21 @ 10:44)     RADIOLOGY & ADDITIONAL TESTS:

## 2021-12-22 NOTE — PROGRESS NOTE ADULT - SUBJECTIVE AND OBJECTIVE BOX
[facility] - Vicente Estevez, Lidya, Shimon, Marlena, Tracey Bateman  Office Number:  [phone]    Patient resting comfortably in bed in NAD.  Laying flat with no respiratory distress.  No overnight events.  Going hospice care.    F/U for:  HTN       MEDICATIONS  (STANDING):  acetaminophen     Tablet .. 650 milliGRAM(s) Oral every 6 hours  atorvastatin 40 milliGRAM(s) Oral at bedtime  bisacodyl Suppository 10 milliGRAM(s) Rectal daily  carBAMazepine 200 milliGRAM(s) Oral three times a day  cholecalciferol 2000 Unit(s) Oral daily  dextrose 5% + sodium chloride 0.45% with potassium chloride 20 mEq/L 1000 milliLiter(s) (75 mL/Hr) IV Continuous <Continuous>  ferrous    sulfate 325 milliGRAM(s) Oral daily  heparin   Injectable 5000 Unit(s) SubCutaneous every 12 hours  hydrALAZINE Injectable 5 milliGRAM(s) IV Push every 6 hours  levothyroxine Injectable 70 MICROGram(s) IV Push at bedtime  losartan 25 milliGRAM(s) Oral daily  multivitamin 1 Tablet(s) Oral daily  pantoprazole  Injectable 40 milliGRAM(s) IV Push daily  polyethylene glycol 3350 17 Gram(s) Oral two times a day  senna 2 Tablet(s) Oral at bedtime    MEDICATIONS  (PRN):  melatonin 3 milliGRAM(s) Oral at bedtime PRN Insomnia      Allergies    Ambien (Unknown)  aspirin (Unknown)  sulfa drugs (Unknown)    Intolerances        Vital Signs Last 24 Hrs  T(C): 36.8 (22 Dec 2021 04:46), Max: 37.1 (21 Dec 2021 21:27)  T(F): 98.2 (22 Dec 2021 04:46), Max: 98.7 (21 Dec 2021 21:27)  HR: 62 (22 Dec 2021 04:46) (62 - 93)  BP: 136/75 (22 Dec 2021 04:46) (121/65 - 200/100)  BP(mean): --  RR: 17 (22 Dec 2021 04:46) (17 - 18)  SpO2: 95% (22 Dec 2021 04:46) (95% - 97%)    I&O's Summary    21 Dec 2021 07:01  -  22 Dec 2021 07:00  --------------------------------------------------------  IN: 950 mL / OUT: 250 mL / NET: 700 mL        ON EXAM:    Constitutional: well-nourished, well-developed, NAD   HEENT:  MMM, sclerae anicteric, conjunctivae clear, no oral cyanosis.  Pulmonary: Non-labored, breath sounds are clear bilaterally, No wheezing, rales or rhonchi  Cardiovascular: Regular, S1 and S2.  No murmur.  No rubs, gallops or clicks  Gastrointestinal: Bowel Sounds present, soft, nontender.   Lymph: No peripheral edema.   Neurological: unable to evaluate, poor mental status  Skin: No rashes.  Psych:  Mood & affect not evaluable      LABS: All Labs Reviewed:                        9.6    9.80  )-----------( 241      ( 21 Dec 2021 07:11 )             29.5                         9.9    11.02 )-----------( 204      ( 20 Dec 2021 07:07 )             30.8                         9.8    12.77 )-----------( 210      ( 19 Dec 2021 07:30 )             29.9     21 Dec 2021 22:41    146    |  110    |  23     ----------------------------<  121    2.9     |  22     |  0.58   21 Dec 2021 15:04    146    |  109    |  26     ----------------------------<  96     2.8     |  22     |  0.58   21 Dec 2021 09:00    148    |  110    |  26     ----------------------------<  106    2.7     |  24     |  0.59     Ca    7.9        21 Dec 2021 22:41  Ca    8.4        21 Dec 2021 15:04  Ca    8.1        21 Dec 2021 09:00  Phos  4.1       21 Dec 2021 07:10  Phos  3.8       20 Dec 2021 07:02  Mg     2.0       21 Dec 2021 07:10  Mg     2.2       20 Dec 2021 07:02    TPro  5.7    /  Alb  3.6    /  TBili  0.3    /  DBili  x      /  AST  29     /  ALT  18     /  AlkPhos  62     20 Dec 2021 07:02          Blood Culture: Organism --  Gram Stain Blood -- Gram Stain --  Specimen Source .Blood Blood-Peripheral  Culture-Blood --    Organism --  Gram Stain Blood -- Gram Stain --  Specimen Source Clean Catch Clean Catch (Midstream)  Culture-Blood --

## 2021-12-22 NOTE — CHART NOTE - NSCHARTNOTEFT_GEN_A_CORE
Palliative reconsulted for PCU evaluation. Per team patient not taking PO today. Chart reviewed and case discussed with primary team NP and patient's family. Plan for transfer to PCU for symptom directed care while patient awaits bed at [facility] with hospice services.     For acute issues or uncontrolled symptoms please page palliative team.    Mihaela Ardon MD  Palliative Medicine Attending   [facility] Pager [phone]

## 2021-12-22 NOTE — PROGRESS NOTE ADULT - PROBLEM SELECTOR PLAN 7
dvt ppx: hsq  palliative evaluated pt - pt didn't participate in speech and swallow eval - pts family were informed about pts current clinical status ,a management plan , prognosis  poss transfer to pcu

## 2021-12-22 NOTE — PROGRESS NOTE ADULT - SUBJECTIVE AND OBJECTIVE BOX
SUBJECTIVE / OVERNIGHT EVENTS:pt seen and examined  21     MEDICATIONS  (STANDING):  acetaminophen     Tablet .. 650 milliGRAM(s) Oral every 6 hours  bisacodyl Suppository 10 milliGRAM(s) Rectal daily  carBAMazepine 200 milliGRAM(s) Oral three times a day  hydrALAZINE Injectable 5 milliGRAM(s) IV Push every 6 hours  levothyroxine Injectable 70 MICROGram(s) IV Push at bedtime  pantoprazole  Injectable 40 milliGRAM(s) IV Push daily  potassium chloride    Tablet ER 40 milliEquivalent(s) Oral daily    MEDICATIONS  (PRN):    Vital Signs Last 24 Hrs  T(C): 36.6 (21 @ 21:34), Max: 36.8 (21 @ 04:46)  T(F): 97.8 (21 @ 21:34), Max: 98.2 (21 @ 04:46)  HR: 86 (21 @ 21:34) (62 - 89)  BP: 147/86 (21 @ 21:34) (121/65 - 160/99)  BP(mean): --  RR: 17 (21 @ 21:34) (17 - 18)  SpO2: 96% (21 @ 21:34) (95% - 96%)        PHYSICAL EXAM:  GENERAL: NAD  EYES: EOMI, PERRLA  NECK: Supple, No JVD  CHEST/LUNG: dec breath sounds at bases  HEART:  S1 , S2 +  ABDOMEN: soft  , bs+  EXTREMITIES:  trace edema  NEUROLOGY:alert awake      LABS:      144  |  110<H>  |  22  ----------------------------<  126<H>  3.3<L>   |  21<L>  |  0.61    Ca    8.0<L>      22 Dec 2021 08:08  Phos  4.1       Mg     2.0           Creatinine Trend: 0.61 <--, 0.58 <--, 0.58 <--, 0.59 <--, 0.64 <--, 0.61 <--, 0.65 <--, 0.76 <--, 0.83 <--, 0.91 <--, 1.07 <--                        9.6    9.70  )-----------( 244      ( 22 Dec 2021 08:09 )             29.1     Urine Studies:  Urinalysis Basic - ( 17 Dec 2021 07:59 )    Color: Yellow / Appearance: Clear / S.023 / pH:   Gluc:  / Ketone: Negative  / Bili: Negative / Urobili: 2 mg/dL   Blood:  / Protein: 30 mg/dL / Nitrite: Negative   Leuk Esterase: Negative / RBC: 4 /hpf / WBC 2 /HPF   Sq Epi:  / Non Sq Epi: 1 /hpf / Bacteria: Negative

## 2021-12-23 LAB
GAMMA INTERFERON BACKGROUND BLD IA-ACNC: 0.02 IU/ML — SIGNIFICANT CHANGE UP
GLUCOSE BLDC GLUCOMTR-MCNC: 101 MG/DL — HIGH (ref 70–99)
GLUCOSE BLDC GLUCOMTR-MCNC: 93 MG/DL — SIGNIFICANT CHANGE UP (ref 70–99)
GLUCOSE BLDC GLUCOMTR-MCNC: 94 MG/DL — SIGNIFICANT CHANGE UP (ref 70–99)
GLUCOSE BLDC GLUCOMTR-MCNC: 99 MG/DL — SIGNIFICANT CHANGE UP (ref 70–99)
M TB IFN-G BLD-IMP: NEGATIVE — SIGNIFICANT CHANGE UP
M TB IFN-G CD4+ BCKGRND COR BLD-ACNC: 0 IU/ML — SIGNIFICANT CHANGE UP
M TB IFN-G CD4+CD8+ BCKGRND COR BLD-ACNC: 0 IU/ML — SIGNIFICANT CHANGE UP
QUANT TB PLUS MITOGEN MINUS NIL: 8.92 IU/ML — SIGNIFICANT CHANGE UP

## 2021-12-23 PROCEDURE — 99232 SBSQ HOSP IP/OBS MODERATE 35: CPT

## 2021-12-23 RX ORDER — POTASSIUM CHLORIDE 20 MEQ
40 PACKET (EA) ORAL ONCE
Refills: 0 | Status: COMPLETED | OUTPATIENT
Start: 2021-12-23 | End: 2021-12-23

## 2021-12-23 RX ORDER — SENNA PLUS 8.6 MG/1
2 TABLET ORAL AT BEDTIME
Refills: 0 | Status: DISCONTINUED | OUTPATIENT
Start: 2021-12-23 | End: 2021-12-25

## 2021-12-23 RX ORDER — POLYETHYLENE GLYCOL 3350 17 G/17G
17 POWDER, FOR SOLUTION ORAL DAILY
Refills: 0 | Status: DISCONTINUED | OUTPATIENT
Start: 2021-12-23 | End: 2021-12-25

## 2021-12-23 RX ADMIN — SENNA PLUS 2 TABLET(S): 8.6 TABLET ORAL at 22:15

## 2021-12-23 RX ADMIN — Medication 5 MILLIGRAM(S): at 23:03

## 2021-12-23 RX ADMIN — Medication 650 MILLIGRAM(S): at 06:31

## 2021-12-23 RX ADMIN — Medication 200 MILLIGRAM(S): at 22:14

## 2021-12-23 RX ADMIN — PANTOPRAZOLE SODIUM 40 MILLIGRAM(S): 20 TABLET, DELAYED RELEASE ORAL at 12:07

## 2021-12-23 RX ADMIN — Medication 5 MILLIGRAM(S): at 12:07

## 2021-12-23 RX ADMIN — Medication 70 MICROGRAM(S): at 22:13

## 2021-12-23 RX ADMIN — Medication 5 MILLIGRAM(S): at 17:48

## 2021-12-23 RX ADMIN — Medication 650 MILLIGRAM(S): at 17:49

## 2021-12-23 RX ADMIN — Medication 650 MILLIGRAM(S): at 12:07

## 2021-12-23 RX ADMIN — Medication 200 MILLIGRAM(S): at 06:31

## 2021-12-23 RX ADMIN — Medication 10 MILLIGRAM(S): at 12:43

## 2021-12-23 RX ADMIN — Medication 200 MILLIGRAM(S): at 17:48

## 2021-12-23 RX ADMIN — POLYETHYLENE GLYCOL 3350 17 GRAM(S): 17 POWDER, FOR SOLUTION ORAL at 12:06

## 2021-12-23 RX ADMIN — Medication 650 MILLIGRAM(S): at 22:15

## 2021-12-23 RX ADMIN — Medication 40 MILLIEQUIVALENT(S): at 12:06

## 2021-12-23 RX ADMIN — Medication 650 MILLIGRAM(S): at 06:51

## 2021-12-23 RX ADMIN — Medication 5 MILLIGRAM(S): at 06:30

## 2021-12-23 NOTE — PROGRESS NOTE ADULT - SUBJECTIVE AND OBJECTIVE BOX
[facility] -- Vicente Estevez, Lidya, Shimon, Fransico Mendiola Savella  Office # [phone]      Follow Up:  HTN    Subjective/Observations: Patient seen and examined. Events noted. Resting comfortably in bed. No complaints of chest pain, dyspnea, or palpitations reported. No signs of orthopnea or PND. planning to tx to PCU while awaiting hospice placement.       REVIEW OF SYSTEMS: All other review of systems is negative unless indicated above    PAST MEDICAL & SURGICAL HISTORY:  Hypertension    Transient ischemic attack (TIA)    Chronic obstructive pulmonary disease (COPD)    Dementia    T2DM (type 2 diabetes mellitus)    HLD (hyperlipidemia)    Hypothyroidism    Trigeminal neuralgia    No significant past surgical history        MEDICATIONS  (STANDING):  acetaminophen     Tablet .. 650 milliGRAM(s) Oral every 6 hours  bisacodyl Suppository 10 milliGRAM(s) Rectal daily  carBAMazepine 200 milliGRAM(s) Oral three times a day  hydrALAZINE Injectable 5 milliGRAM(s) IV Push every 6 hours  levothyroxine Injectable 70 MICROGram(s) IV Push at bedtime  pantoprazole  Injectable 40 milliGRAM(s) IV Push daily  potassium chloride    Tablet ER 40 milliEquivalent(s) Oral daily    MEDICATIONS  (PRN):      Allergies    Ambien (Unknown)  aspirin (Unknown)  sulfa drugs (Unknown)    Intolerances            Vital Signs Last 24 Hrs  T(C): 36.8 (23 Dec 2021 05:48), Max: 36.8 (22 Dec 2021 12:41)  T(F): 98.3 (23 Dec 2021 05:48), Max: 98.3 (23 Dec 2021 05:48)  HR: 84 (23 Dec 2021 05:48) (84 - 89)  BP: 162/97 (23 Dec 2021 05:48) (147/86 - 162/97)  BP(mean): --  RR: 17 (23 Dec 2021 05:48) (17 - 18)  SpO2: 97% (23 Dec 2021 05:48) (95% - 97%)    I&O's Summary        PHYSICAL EXAM:     Constitutional: NAD, awake    HEENT: Moist Mucous Membranes, Anicteric  Pulmonary: Decreased breath sounds b/l. No rales, crackles or wheeze appreciated.   Cardiovascular: Regular, S1 and S2, No murmurs, rubs, gallops or clicks  Gastrointestinal: Bowel Sounds present, soft, nontender.   Lymph: No peripheral edema. No lymphadenopathy.  Skin: No visible rashes or ulcers.  Psych:  Mood & affect appropriate for situation    LABS: All Labs Reviewed:                        9.6    9.70  )-----------( 244      ( 22 Dec 2021 08:09 )             29.1                         9.6    9.80  )-----------( 241      ( 21 Dec 2021 07:11 )             29.5     22 Dec 2021 08:08    144    |  110    |  22     ----------------------------<  126    3.3     |  21     |  0.61   21 Dec 2021 22:41    146    |  110    |  23     ----------------------------<  121    2.9     |  22     |  0.58   21 Dec 2021 15:04    146    |  109    |  26     ----------------------------<  96     2.8     |  22     |  0.58     Ca    8.0        22 Dec 2021 08:08  Ca    7.9        21 Dec 2021 22:41  Ca    8.4        21 Dec 2021 15:04  Phos  4.1       21 Dec 2021 07:10  Mg     2.0       21 Dec 2021 07:10

## 2021-12-23 NOTE — CHART NOTE - NSCHARTNOTEFT_GEN_A_CORE
Chart reviewed. The patient is seen and examined at the bedside. Spoke to the patient's primary nurse at the bedside. No acute events overnight. Reports that she took some of her Glucerna this morning. FLACC score 0. VSS. Plan for transfer to PCU for symptom directed care while patient awaits bed at [facility] with hospice services.   For acute issues or uncontrolled symptoms please page palliative team if the patient is at [facility] #[id number]

## 2021-12-23 NOTE — PROGRESS NOTE ADULT - SUBJECTIVE AND OBJECTIVE BOX
SUBJECTIVE / OVERNIGHT EVENTS:pt seen and examined  21     MEDICATIONS  (STANDING):  acetaminophen     Tablet .. 650 milliGRAM(s) Oral every 6 hours  bisacodyl Suppository 10 milliGRAM(s) Rectal daily  carBAMazepine 200 milliGRAM(s) Oral three times a day  hydrALAZINE Injectable 5 milliGRAM(s) IV Push every 6 hours  levothyroxine Injectable 70 MICROGram(s) IV Push at bedtime  pantoprazole  Injectable 40 milliGRAM(s) IV Push daily  polyethylene glycol 3350 17 Gram(s) Oral daily  senna 2 Tablet(s) Oral at bedtime    MEDICATIONS  (PRN):    Vital Signs Last 24 Hrs  T(C): 36.4 (21 @ 20:40), Max: 36.8 (21 @ 05:48)  T(F): 97.5 (21 @ 20:40), Max: 98.3 (21 @ 05:48)  HR: 97 (21 @ 20:40) (81 - 97)  BP: 131/75 (21 @ 20:40) (131/75 - 162/97)  BP(mean): --  RR: 18 (21 @ 20:40) (17 - 18)  SpO2: 97% (21 @ 20:40) (97% - 98%)      PHYSICAL EXAM:  GENERAL: NAD  EYES: EOMI, PERRLA  NECK: Supple, No JVD  CHEST/LUNG: dec breath sounds at bases  HEART:  S1 , S2 +  ABDOMEN: soft  , bs+  EXTREMITIES:  trace edema  NEUROLOGY:alert awake      LABS:      144  |  110<H>  |  22  ----------------------------<  126<H>  3.3<L>   |  21<L>  |  0.61    Ca    8.0<L>      22 Dec 2021 08:08      Creatinine Trend: 0.61 <--, 0.58 <--, 0.58 <--, 0.59 <--, 0.64 <--, 0.61 <--, 0.65 <--, 0.76 <--, 0.83 <--, 0.91 <--, 1.07 <--                        9.6    9.70  )-----------( 244      ( 22 Dec 2021 08:09 )             29.1     Urine Studies:  Urinalysis Basic - ( 17 Dec 2021 07:59 )    Color: Yellow / Appearance: Clear / S.023 / pH:   Gluc:  / Ketone: Negative  / Bili: Negative / Urobili: 2 mg/dL   Blood:  / Protein: 30 mg/dL / Nitrite: Negative   Leuk Esterase: Negative / RBC: 4 /hpf / WBC 2 /HPF   Sq Epi:  / Non Sq Epi: 1 /hpf / Bacteria: Negative

## 2021-12-23 NOTE — PROGRESS NOTE ADULT - SUBJECTIVE AND OBJECTIVE BOX
INTERVAL HPI/OVERNIGHT EVENTS:  ate with RN assistance this AM - drank 1 Glucerna this AM  no nausea or vomiting  denies abdominal pain  Miralax and Senna were d/c'ed yesterday per chart review    awaiting transfer to PCU when bed available - pending final placement to [facility] with Hospice services  no acute issues overnight      MEDICATIONS  (STANDING):  acetaminophen     Tablet .. 650 milliGRAM(s) Oral every 6 hours  bisacodyl Suppository 10 milliGRAM(s) Rectal daily  carBAMazepine 200 milliGRAM(s) Oral three times a day  hydrALAZINE Injectable 5 milliGRAM(s) IV Push every 6 hours  levothyroxine Injectable 70 MICROGram(s) IV Push at bedtime  pantoprazole  Injectable 40 milliGRAM(s) IV Push daily    MEDICATIONS  (PRN):      Allergies  Ambien (Unknown)  aspirin (Unknown)  sulfa drugs (Unknown)      Review of Systems:  unable to obtain from pt    Vital Signs Last 24 Hrs  T(C): 36.6 (23 Dec 2021 11:37), Max: 36.8 (22 Dec 2021 12:41)  T(F): 97.9 (23 Dec 2021 11:37), Max: 98.3 (23 Dec 2021 05:48)  HR: 81 (23 Dec 2021 11:37) (81 - 89)  BP: 136/80 (23 Dec 2021 11:37) (136/80 - 162/97)  BP(mean): --  RR: 17 (23 Dec 2021 11:37) (17 - 18)  SpO2: 98% (23 Dec 2021 11:37) (95% - 98%)    PHYSICAL EXAM:  Constitutional: NAD, elderly female  chronically ill appearing  smiling and resting calmly in bed.  gives yes/no responses to questions  Neck: No LAD, supple no JVD  Respiratory: grossly clear, no accessory muscle use  Cardiovascular: S1 and S2, RRR, no M  Gastrointestinal: BS+, softly distended   NT   Extremities: No peripheral edema, neg clubbing, cyanosis  Vascular: 2+ peripheral pulses  Neurological: oriented to self   moves all extremities.  no focal asymmetry  Psychiatric: appears calm,  keeps eyes closed for majority of interaction/interview and exam  Skin: No rashes, fragile skin with areas of healing bruises      LABS:                        9.6    9.70  )-----------( 244      ( 22 Dec 2021 08:09 )             29.1     12-22    144  |  110<H>  |  22  ----------------------------<  126<H>  3.3<L>   |  21<L>  |  0.61    Ca    8.0<L>      22 Dec 2021 08:08           RADIOLOGY & ADDITIONAL TESTS:

## 2021-12-24 LAB
GLUCOSE BLDC GLUCOMTR-MCNC: 145 MG/DL — HIGH (ref 70–99)
GLUCOSE BLDC GLUCOMTR-MCNC: 88 MG/DL — SIGNIFICANT CHANGE UP (ref 70–99)

## 2021-12-24 PROCEDURE — 99232 SBSQ HOSP IP/OBS MODERATE 35: CPT

## 2021-12-24 RX ORDER — MORPHINE SULFATE 50 MG/1
5 CAPSULE, EXTENDED RELEASE ORAL
Refills: 0 | Status: DISCONTINUED | OUTPATIENT
Start: 2021-12-24 | End: 2021-12-25

## 2021-12-24 RX ADMIN — Medication 200 MILLIGRAM(S): at 17:08

## 2021-12-24 RX ADMIN — Medication 70 MICROGRAM(S): at 22:40

## 2021-12-24 RX ADMIN — Medication 200 MILLIGRAM(S): at 22:40

## 2021-12-24 RX ADMIN — Medication 650 MILLIGRAM(S): at 11:38

## 2021-12-24 RX ADMIN — Medication 200 MILLIGRAM(S): at 06:30

## 2021-12-24 RX ADMIN — Medication 650 MILLIGRAM(S): at 22:40

## 2021-12-24 RX ADMIN — POLYETHYLENE GLYCOL 3350 17 GRAM(S): 17 POWDER, FOR SOLUTION ORAL at 11:37

## 2021-12-24 RX ADMIN — Medication 5 MILLIGRAM(S): at 17:09

## 2021-12-24 RX ADMIN — Medication 5 MILLIGRAM(S): at 11:37

## 2021-12-24 RX ADMIN — Medication 650 MILLIGRAM(S): at 03:35

## 2021-12-24 RX ADMIN — Medication 10 MILLIGRAM(S): at 16:13

## 2021-12-24 RX ADMIN — PANTOPRAZOLE SODIUM 40 MILLIGRAM(S): 20 TABLET, DELAYED RELEASE ORAL at 11:38

## 2021-12-24 RX ADMIN — Medication 5 MILLIGRAM(S): at 06:30

## 2021-12-24 RX ADMIN — Medication 650 MILLIGRAM(S): at 06:30

## 2021-12-24 RX ADMIN — Medication 650 MILLIGRAM(S): at 06:39

## 2021-12-24 RX ADMIN — Medication 650 MILLIGRAM(S): at 17:08

## 2021-12-24 RX ADMIN — SENNA PLUS 2 TABLET(S): 8.6 TABLET ORAL at 22:40

## 2021-12-24 NOTE — PROGRESS NOTE ADULT - SUBJECTIVE AND OBJECTIVE BOX
[facility]    Vicente Estevez, Lidya, Shimon, Marlena, Fransico, Tracey      [phone]    CHIEF COMPLAINT: Patient is a 89y old  Female who presents with a chief complaint of unwitnessed fall (23 Dec 2021 11:40)      Follow Up: fall    Interim history: The patient reports no new symptoms.  Denies chest discomfort and shortness of breath.  No abdominal pain.  No new neurologic symptoms.      MEDICATIONS  (STANDING):  acetaminophen     Tablet .. 650 milliGRAM(s) Oral every 6 hours  bisacodyl Suppository 10 milliGRAM(s) Rectal daily  carBAMazepine 200 milliGRAM(s) Oral three times a day  hydrALAZINE Injectable 5 milliGRAM(s) IV Push every 6 hours  levothyroxine Injectable 70 MICROGram(s) IV Push at bedtime  pantoprazole  Injectable 40 milliGRAM(s) IV Push daily  polyethylene glycol 3350 17 Gram(s) Oral daily  senna 2 Tablet(s) Oral at bedtime    MEDICATIONS  (PRN):      REVIEW OF SYSTEMS:  eye, ent, GI, , allergic, dermatologic, musculoskeletal and neurologic are negative except as described above    Vital Signs Last 24 Hrs  T(C): 36.4 (24 Dec 2021 04:33), Max: 36.6 (23 Dec 2021 11:37)  T(F): 97.6 (24 Dec 2021 04:33), Max: 97.9 (23 Dec 2021 11:37)  HR: 89 (24 Dec 2021 04:33) (81 - 97)  BP: 146/88 (24 Dec 2021 04:33) (131/75 - 146/88)  BP(mean): --  RR: 18 (24 Dec 2021 04:33) (17 - 18)  SpO2: 97% (24 Dec 2021 04:33) (97% - 98%)    I&O's Summary    23 Dec 2021 07:01  -  24 Dec 2021 07:00  --------------------------------------------------------  IN: 337 mL / OUT: 200 mL / NET: 137 mL        Telemetry past 24h:     PHYSICAL EXAM:    Constitutional: well-nourished, well-developed, NAD   HEENT:  MMM, sclerae anicteric, conjunctivae clear, no oral cyanosis.  Pulmonary: Non-labored, breath sounds are clear bilaterally, No wheezing, rales or rhonchi  Cardiovascular: Regular, S1 and S2.  No murmur.  No rubs, gallops or clicks  Gastrointestinal: Bowel Sounds present, soft, nontender.   Lymph: No peripheral edema.   Neurological: Alert, no focal deficits  Skin: No rashes.  Psych:  Mood & affect appropriate    LABS: All Labs Reviewed:                        9.6    9.70  )-----------( 244      ( 22 Dec 2021 08:09 )             29.1     22 Dec 2021 08:08    144    |  110    |  22     ----------------------------<  126    3.3     |  21     |  0.61   21 Dec 2021 22:41    146    |  110    |  23     ----------------------------<  121    2.9     |  22     |  0.58   21 Dec 2021 15:04    146    |  109    |  26     ----------------------------<  96     2.8     |  22     |  0.58     Ca    8.0        22 Dec 2021 08:08  Ca    7.9        21 Dec 2021 22:41  Ca    8.4        21 Dec 2021 15:04            Blood Culture:         RADIOLOGY:    EKG:    Echo:

## 2021-12-24 NOTE — PROGRESS NOTE ADULT - SUBJECTIVE AND OBJECTIVE BOX
SUBJECTIVE / OVERNIGHT EVENTS:pt seen and examined  12-24-21     MEDICATIONS  (STANDING):  acetaminophen     Tablet .. 650 milliGRAM(s) Oral every 6 hours  bisacodyl Suppository 10 milliGRAM(s) Rectal daily  carBAMazepine 200 milliGRAM(s) Oral three times a day  hydrALAZINE Injectable 5 milliGRAM(s) IV Push every 6 hours  levothyroxine Injectable 70 MICROGram(s) IV Push at bedtime  pantoprazole  Injectable 40 milliGRAM(s) IV Push daily  polyethylene glycol 3350 17 Gram(s) Oral daily  senna 2 Tablet(s) Oral at bedtime    MEDICATIONS  (PRN):  LORazepam     Tablet 0.5 milliGRAM(s) Oral every 4 hours PRN Anxiety  morphine   Solution 5 milliGRAM(s) Oral every 2 hours PRN Severe Pain (7 - 10)  morphine   Solution 5 milliGRAM(s) Oral every 2 hours PRN dyspnea    Vital Signs Last 24 Hrs  T(C): 36.2 (12-24-21 @ 20:07), Max: 36.5 (12-24-21 @ 13:10)  T(F): 97.2 (12-24-21 @ 20:07), Max: 97.7 (12-24-21 @ 13:10)  HR: 89 (12-24-21 @ 20:07) (86 - 90)  BP: 120/78 (12-24-21 @ 20:07) (99/60 - 146/88)  BP(mean): --  RR: 18 (12-24-21 @ 20:07) (17 - 18)  SpO2: 98% (12-24-21 @ 20:07) (97% - 98%)      PHYSICAL EXAM:  GENERAL: NAD  EYES: EOMI, PERRLA  NECK: Supple, No JVD  CHEST/LUNG: dec breath sounds at bases  HEART:  S1 , S2 +  ABDOMEN: soft  , bs+  EXTREMITIES:  trace edema  NEUROLOGY:alert awake      LABS:        Creatinine Trend: 0.61 <--, 0.58 <--, 0.58 <--, 0.59 <--, 0.64 <--, 0.61 <--, 0.65 <--, 0.76 <--, 0.83 <--    Urine Studies:                  Leuk Esterase: Negative / RBC: 4 /hpf / WBC 2 /HPF   Sq Epi:  / Non Sq Epi: 1 /hpf / Bacteria: Negative

## 2021-12-25 LAB — GLUCOSE BLDC GLUCOMTR-MCNC: 104 MG/DL — HIGH (ref 70–99)

## 2021-12-25 PROCEDURE — 99232 SBSQ HOSP IP/OBS MODERATE 35: CPT | Mod: GC

## 2021-12-25 RX ORDER — SENNA PLUS 8.6 MG/1
2 TABLET ORAL AT BEDTIME
Refills: 0 | Status: DISCONTINUED | OUTPATIENT
Start: 2021-12-25 | End: 2021-12-27

## 2021-12-25 RX ORDER — ACETAMINOPHEN 500 MG
650 TABLET ORAL EVERY 6 HOURS
Refills: 0 | Status: DISCONTINUED | OUTPATIENT
Start: 2021-12-25 | End: 2021-12-27

## 2021-12-25 RX ORDER — MORPHINE SULFATE 50 MG/1
1 CAPSULE, EXTENDED RELEASE ORAL
Refills: 0 | Status: DISCONTINUED | OUTPATIENT
Start: 2021-12-25 | End: 2021-12-27

## 2021-12-25 RX ORDER — POLYETHYLENE GLYCOL 3350 17 G/17G
17 POWDER, FOR SOLUTION ORAL DAILY
Refills: 0 | Status: DISCONTINUED | OUTPATIENT
Start: 2021-12-25 | End: 2021-12-27

## 2021-12-25 RX ADMIN — Medication 5 MILLIGRAM(S): at 17:29

## 2021-12-25 RX ADMIN — Medication 5 MILLIGRAM(S): at 12:22

## 2021-12-25 RX ADMIN — Medication 650 MILLIGRAM(S): at 05:25

## 2021-12-25 RX ADMIN — MORPHINE SULFATE 1 MILLIGRAM(S): 50 CAPSULE, EXTENDED RELEASE ORAL at 17:28

## 2021-12-25 RX ADMIN — MORPHINE SULFATE 1 MILLIGRAM(S): 50 CAPSULE, EXTENDED RELEASE ORAL at 23:50

## 2021-12-25 RX ADMIN — Medication 200 MILLIGRAM(S): at 05:25

## 2021-12-25 RX ADMIN — PANTOPRAZOLE SODIUM 40 MILLIGRAM(S): 20 TABLET, DELAYED RELEASE ORAL at 12:22

## 2021-12-25 RX ADMIN — Medication 0.5 MILLIGRAM(S): at 14:44

## 2021-12-25 RX ADMIN — Medication 5 MILLIGRAM(S): at 05:25

## 2021-12-25 RX ADMIN — Medication 5 MILLIGRAM(S): at 01:55

## 2021-12-25 RX ADMIN — Medication 0.5 MILLIGRAM(S): at 22:19

## 2021-12-25 RX ADMIN — Medication 70 MICROGRAM(S): at 22:18

## 2021-12-25 RX ADMIN — Medication 5 MILLIGRAM(S): at 23:49

## 2021-12-25 RX ADMIN — Medication 0.5 MILLIGRAM(S): at 18:10

## 2021-12-25 RX ADMIN — Medication 0.5 MILLIGRAM(S): at 13:35

## 2021-12-25 NOTE — PROGRESS NOTE ADULT - SUBJECTIVE AND OBJECTIVE BOX
GAP TEAM PALLIATIVE CARE UNIT PROGRESS NOTE:      [  ] Patient on hospice program.    INDICATION FOR PALLIATIVE CARE UNIT SERVICES:symptom based management    INTERVAL HPI/OVERNIGHT EVENTS:Patient seen at bedside. Unarousable, not responsive to touch. Required no medications for symptom management overnight. Unable to take oral medications. 2 BMs reported by RN last evening. Resolving ileus per imaging, reviewed. Per details from dtr, patient has been declining the last few months, in AF since 3/21. Plan was to transition to hospice at [facility] but got admitted prior to that.     DNR on chart: yes  Allergies    Ambien (Unknown)  aspirin (Unknown)  sulfa drugs (Unknown)    Intolerances    MEDICATIONS  (STANDING):  carBAMazepine 200 milliGRAM(s) Oral three times a day  hydrALAZINE Injectable 5 milliGRAM(s) IV Push every 6 hours  levothyroxine Injectable 70 MICROGram(s) IV Push at bedtime  pantoprazole  Injectable 40 milliGRAM(s) IV Push daily    MEDICATIONS  (PRN):  acetaminophen     Tablet .. 650 milliGRAM(s) Oral every 6 hours PRN Mild Pain (1 - 3)  bisacodyl Suppository 10 milliGRAM(s) Rectal daily PRN Constipation  LORazepam     Tablet 0.5 milliGRAM(s) Oral every 4 hours PRN Anxiety  morphine   Solution 5 milliGRAM(s) Oral every 2 hours PRN Severe Pain (7 - 10)  morphine   Solution 5 milliGRAM(s) Oral every 2 hours PRN dyspnea  polyethylene glycol 3350 17 Gram(s) Oral daily PRN Constipation  senna 2 Tablet(s) Oral at bedtime PRN Constipation    ITEMS UNCHECKED ARE NOT PRESENT    PRESENT SYMPTOMS: [x ]Unable to obtain due to poor mentation   Source if other than patient:  [ ]Family   [ ]Team     Pain: [ ] yes [ ] no  QOL impact -   Location -                    Aggravating factors -  Quality -  Radiation -  Timing-  Severity (0-10 scale):  Minimal acceptable level (0-10 scale):     Dyspnea:                           [ ]Mild [ ]Moderate [ ]Severe  Anxiety:                             [ ]Mild [ ]Moderate [ ]Severe  Fatigue:                             [ ]Mild [ ]Moderate [ ]Severe  Nausea:                             [ ]Mild [ ]Moderate [ ]Severe  Loss of appetite:              [ ]Mild [ ]Moderate [ ]Severe  Constipation:                    [ ]Mild [ ]Moderate [ ]Severe    PAINAD Score:0    [email or web address] (Ctrl +  left click to view)  		  Other Symptoms:  [ ]All other review of systems negative     Palliative Performance Status Version 2:      10   %         http://[facility].org/files/news/palliative_performance_scale_[email or web address].pdf  PHYSICAL EXAM:  Vital Signs Last 24 Hrs  T(C): 36.7 (25 Dec 2021 08:12), Max: 36.7 (25 Dec 2021 08:12)  T(F): 98.1 (25 Dec 2021 08:12), Max: 98.1 (25 Dec 2021 08:12)  HR: 94 (25 Dec 2021 08:12) (86 - 94)  BP: 98/59 (25 Dec 2021 08:12) (98/59 - 156/84)  BP(mean): --  RR: 16 (25 Dec 2021 08:12) (16 - 18)  SpO2: 96% (25 Dec 2021 08:12) (96% - 98%) I&O's Summary    24 Dec 2021 07:01  -  25 Dec 2021 07:00  --------------------------------------------------------  IN: 537 mL / OUT: 100 mL / NET: 437 mL    GENERAL:  [ ]Alert  [ ]Oriented x   [ ]Lethargic  [ ]Cachexia  [ x]Unarousable  [ ]Verbal  [ ]Non-Verbal  Behavioral:   [ ] Anxiety  [ ] Delirium [ ] Agitation [ ] Other  HEENT:  [ ]Normal   [ ]Dry mouth   [ ]ET Tube/Trach  [ ]Oral lesions  PULMONARY:   [ ]Clear [ ]Tachypnea  [ ]Audible excessive secretions   [ ]Rhonchi        [ ]Right [ ]Left [ ]Bilateral  [ ]Crackles        [ ]Right [ ]Left [ ]Bilateral  [ ]Wheezing     [ ]Right [ ]Left [ ]Bilateral  [x ]Diminished BS [ ]Right [ ]Left [x ]Bilateral    CARDIOVASCULAR:    [x ]Regular [ ]Irregular [ ]Tachy  [ ]Hamlet [ ]Murmur [ ]Other  GASTROINTESTINAL:  [x ]Soft  [ ]Distended   [x ]+BS  [ ]Non tender [ ]Tender  [ ]PEG [ ]OGT/ NGT   Last BM:     GENITOURINARY:  [ ]Normal [ ] Incontinent   [ ]Oliguria/Anuria   [ ]Hendricks  MUSCULOSKELETAL:   [ ]Normal   [x ]Weakness  [ ]Bed/Wheelchair bound [ ]Edema  NEUROLOGIC:   [ ]No focal deficits  [ ] Cognitive impairment  [ ] Dysphagia [ ]Dysarthria [ ] Paresis [ ]Other   SKIN:   [ ]Normal  [ ]Rash     [ ]Pressure ulcer(s)  [ ]y [ ]n  Present on admission      CRITICAL CARE:  [ ] Shock Present  [ ]Septic [ ]Cardiogenic [ ]Neurologic [ ]Hypovolemic  [ ]  Vasopressors [ ]  Inotropes   [ ] Respiratory failure present [ ] Mechanical Ventilation [ ] Non-invasive ventilatory support [ ] High-Flow  [ ] Acute  [ ] Chronic [ ] Hypoxic  [ ] Hypercarbic [ ] Other  [ ] Other organ failure     LABS: none new            RADIOLOGY & ADDITIONAL STUDIES: none new    PROTEIN CALORIE MALNUTRITION: [ ] mild [ ] moderate [ ] severe  [ ] underweight [ ] morbid obesity    https://www.andeal.org/vault/2440/web/files/ONC/Table_Clinical%20Characteristics%20to%20Document%20Malnutrition-White%20JV%20et%20al%774165.pdf    Height (cm): 160 (12-18-21 @ 20:11), 160 (11-28-21 @ 19:21), 160 (10-26-21 @ 21:52)  Weight (kg): 53.5 (12-18-21 @ 20:11), 63.5 (11-28-21 @ 19:21), 56.7 (10-26-21 @ 21:52)  BMI (kg/m2): 20.9 (12-18-21 @ 20:11), 24.8 (11-28-21 @ 19:21), 22.1 (10-26-21 @ 21:52)    [ ] PPSV2 < or = 30% [ ] significant weight loss [ ] poor nutritional intake [ ] anasarca   Artificial Nutrition [ ]     REFERRALS:   [ ]Chaplaincy  [ ] Hospice  [ ]Child Life  [ x]Social Work  [ ]Case management [ ]Holistic Therapy [ ] Physical Therapy [ ] Dietary   Goals of Care Document: refer 12/21

## 2021-12-26 DIAGNOSIS — F03.91 UNSPECIFIED DEMENTIA WITH BEHAVIORAL DISTURBANCE: ICD-10-CM

## 2021-12-26 LAB
ANION GAP SERPL CALC-SCNC: 13 MMOL/L — SIGNIFICANT CHANGE UP (ref 5–17)
BUN SERPL-MCNC: 48 MG/DL — HIGH (ref 7–23)
CALCIUM SERPL-MCNC: 8.7 MG/DL — SIGNIFICANT CHANGE UP (ref 8.4–10.5)
CHLORIDE SERPL-SCNC: 115 MMOL/L — HIGH (ref 96–108)
CO2 SERPL-SCNC: 18 MMOL/L — LOW (ref 22–31)
CREAT SERPL-MCNC: 1.92 MG/DL — HIGH (ref 0.5–1.3)
GLUCOSE SERPL-MCNC: 89 MG/DL — SIGNIFICANT CHANGE UP (ref 70–99)
HCT VFR BLD CALC: 30.4 % — LOW (ref 34.5–45)
HGB BLD-MCNC: 9.4 G/DL — LOW (ref 11.5–15.5)
MCHC RBC-ENTMCNC: 29.1 PG — SIGNIFICANT CHANGE UP (ref 27–34)
MCHC RBC-ENTMCNC: 30.9 GM/DL — LOW (ref 32–36)
MCV RBC AUTO: 94.1 FL — SIGNIFICANT CHANGE UP (ref 80–100)
NRBC # BLD: 0 /100 WBCS — SIGNIFICANT CHANGE UP (ref 0–0)
PLATELET # BLD AUTO: 285 K/UL — SIGNIFICANT CHANGE UP (ref 150–400)
POTASSIUM SERPL-MCNC: 4.4 MMOL/L — SIGNIFICANT CHANGE UP (ref 3.5–5.3)
POTASSIUM SERPL-SCNC: 4.4 MMOL/L — SIGNIFICANT CHANGE UP (ref 3.5–5.3)
RBC # BLD: 3.23 M/UL — LOW (ref 3.8–5.2)
RBC # FLD: 20 % — HIGH (ref 10.3–14.5)
SODIUM SERPL-SCNC: 146 MMOL/L — HIGH (ref 135–145)
WBC # BLD: 9.62 K/UL — SIGNIFICANT CHANGE UP (ref 3.8–10.5)
WBC # FLD AUTO: 9.62 K/UL — SIGNIFICANT CHANGE UP (ref 3.8–10.5)

## 2021-12-26 PROCEDURE — 99233 SBSQ HOSP IP/OBS HIGH 50: CPT | Mod: GC

## 2021-12-26 RX ORDER — HYDRALAZINE HCL 50 MG
5 TABLET ORAL EVERY 6 HOURS
Refills: 0 | Status: DISCONTINUED | OUTPATIENT
Start: 2021-12-26 | End: 2021-12-28

## 2021-12-26 RX ADMIN — Medication 0.5 MILLIGRAM(S): at 17:15

## 2021-12-26 RX ADMIN — Medication 1 MILLIGRAM(S): at 19:50

## 2021-12-26 RX ADMIN — Medication 0.5 MILLIGRAM(S): at 12:46

## 2021-12-26 RX ADMIN — MORPHINE SULFATE 1 MILLIGRAM(S): 50 CAPSULE, EXTENDED RELEASE ORAL at 08:45

## 2021-12-26 RX ADMIN — MORPHINE SULFATE 1 MILLIGRAM(S): 50 CAPSULE, EXTENDED RELEASE ORAL at 19:50

## 2021-12-26 RX ADMIN — Medication 5 MILLIGRAM(S): at 05:23

## 2021-12-26 RX ADMIN — PANTOPRAZOLE SODIUM 40 MILLIGRAM(S): 20 TABLET, DELAYED RELEASE ORAL at 12:00

## 2021-12-26 RX ADMIN — Medication 0.5 MILLIGRAM(S): at 08:45

## 2021-12-26 RX ADMIN — MORPHINE SULFATE 1 MILLIGRAM(S): 50 CAPSULE, EXTENDED RELEASE ORAL at 12:46

## 2021-12-26 RX ADMIN — Medication 70 MICROGRAM(S): at 21:28

## 2021-12-26 RX ADMIN — MORPHINE SULFATE 1 MILLIGRAM(S): 50 CAPSULE, EXTENDED RELEASE ORAL at 02:46

## 2021-12-26 RX ADMIN — Medication 0.5 MILLIGRAM(S): at 02:46

## 2021-12-26 NOTE — PROGRESS NOTE ADULT - NSPROGADDITIONALINFOA_GEN_ALL_CORE
Jessi Cuellar, PGY-5  Hospice and Palliative Care Medicine Fellow
Jessi Cuellar, PGY-5  Hospice and Palliative Care Medicine Fellow

## 2021-12-26 NOTE — PROGRESS NOTE ADULT - SUBJECTIVE AND OBJECTIVE BOX
GAP TEAM PALLIATIVE CARE UNIT PROGRESS NOTE:      [  ] Patient on hospice program.    INDICATION FOR PALLIATIVE CARE UNIT SERVICES:symptom based management    INTERVAL HPI/OVERNIGHT EVENTS:Patient seen at bedside. Appears comfortable, sleeping. Required 3 PRN doses of morphine, 4 PRN doses of ativan. LBM 12/25. PAINAD 0.    DNR on chart: Yes  Yes      Allergies    Ambien (Unknown)  aspirin (Unknown)  sulfa drugs (Unknown)    Intolerances    MEDICATIONS  (STANDING):  levothyroxine Injectable 70 MICROGram(s) IV Push at bedtime  LORazepam   Injectable 0.5 milliGRAM(s) IV Push every 6 hours  pantoprazole  Injectable 40 milliGRAM(s) IV Push daily    MEDICATIONS  (PRN):  acetaminophen     Tablet .. 650 milliGRAM(s) Oral every 6 hours PRN Mild Pain (1 - 3)  bisacodyl Suppository 10 milliGRAM(s) Rectal daily PRN Constipation  hydrALAZINE Injectable 5 milliGRAM(s) IV Push every 6 hours PRN hypertension  LORazepam   Injectable 1 milliGRAM(s) IV Push every 1 hour PRN Anxiety and/or refractory dyspnea  morphine  - Injectable 1 milliGRAM(s) IV Push every 1 hour PRN Moderate to severe pain  morphine  - Injectable 1 milliGRAM(s) IV Push every 1 hour PRN Dyspnea  polyethylene glycol 3350 17 Gram(s) Oral daily PRN Constipation  senna 2 Tablet(s) Oral at bedtime PRN Constipation    ITEMS UNCHECKED ARE NOT PRESENT    PRESENT SYMPTOMS: [x ]Unable to obtain due to poor mentation   Source if other than patient:  [ ]Family   [ ]Team     Pain: [ ] yes [ ] no  QOL impact -   Location -                    Aggravating factors -  Quality -  Radiation -  Timing-  Severity (0-10 scale):  Minimal acceptable level (0-10 scale):     Dyspnea:                           [ ]Mild [ ]Moderate [ ]Severe  Anxiety:                             [ ]Mild [ ]Moderate [ ]Severe  Fatigue:                             [ ]Mild [ ]Moderate [ ]Severe  Nausea:                             [ ]Mild [ ]Moderate [ ]Severe  Loss of appetite:              [ ]Mild [ ]Moderate [ ]Severe  Constipation:                    [ ]Mild [ ]Moderate [ ]Severe    PAINAD Score:0    http://geriatrictoolkit.[email or web address].[facility]/cog/painad.pdf (Ctrl +  left click to view)  		  Other Symptoms:  [ ]All other review of systems negative     Palliative Performance Status Version 2:    10     %         http://npcrc.org/files/news/palliative_performance_scale_ppsv2.pdf  PHYSICAL EXAM:  Vital Signs Last 24 Hrs  T(C): 36.6 (26 Dec 2021 08:50), Max: 36.6 (26 Dec 2021 08:50)  T(F): 97.9 (26 Dec 2021 08:50), Max: 97.9 (26 Dec 2021 08:50)  HR: 103 (26 Dec 2021 08:50) (103 - 103)  BP: 134/71 (26 Dec 2021 08:50) (134/71 - 134/71)  BP(mean): --  RR: 16 (26 Dec 2021 08:50) (16 - 16)  SpO2: 95% (26 Dec 2021 08:50) (95% - 95%) I&O's Summary    25 Dec 2021 07:01  -  26 Dec 2021 07:00  --------------------------------------------------------  IN: 0 mL / OUT: 250 mL / NET: -250 mL    GENERAL:  [ ]Alert  [ ]Oriented x   [ ]Lethargic  [ ]Cachexia  [x ]Unarousable  [ ]Verbal  [ ]Non-Verbal  Behavioral:   [ ] Anxiety  [x ] Delirium [ ] Agitation [ ] Other  HEENT:  [ ]Normal   [ ]Dry mouth   [ ]ET Tube/Trach  [ ]Oral lesions  PULMONARY:   [ ]Clear [ ]Tachypnea  [ ]Audible excessive secretions   [ ]Rhonchi        [ ]Right [ ]Left [ ]Bilateral  [ ]Crackles        [ ]Right [ ]Left [ ]Bilateral  [ ]Wheezing     [ ]Right [ ]Left [ ]Bilateral  [x ]Diminished BS [ ]Right [ ]Left [ x]Bilateral    CARDIOVASCULAR:    [x ]Regular [ ]Irregular [ ]Tachy  [ ]Hamlet [ ]Murmur [ ]Other  GASTROINTESTINAL:  [ x]Soft  [ ]Distended   [x ]+BS  [x ]Non tender [ ]Tender  [ ]PEG [ ]OGT/ NGT   Last BM:    GENITOURINARY:  [ ]Normal [ ] Incontinent   [ ]Oliguria/Anuria   [ ]Hendricks  MUSCULOSKELETAL:   [ ]Normal   [x ]Weakness  [ ]Bed/Wheelchair bound [ ]Edema  NEUROLOGIC:   [ ]No focal deficits  [ ] Cognitive impairment  [ ] Dysphagia [ ]Dysarthria [ ] Paresis [ ]Other   SKIN:   [ ]Normal  [ ]Rash     [ ]Pressure ulcer(s)  [ ]y [ ]n  Present on admission      CRITICAL CARE:  [ ] Shock Present  [ ]Septic [ ]Cardiogenic [ ]Neurologic [ ]Hypovolemic  [ ]  Vasopressors [ ]  Inotropes   [ ] Respiratory failure present [ ] Mechanical Ventilation [ ] Non-invasive ventilatory support [ ] High-Flow  [ ] Acute  [ ] Chronic [ ] Hypoxic  [ ] Hypercarbic [ ] Other  [ ] Other organ failure     LABS:                        9.4    9.62  )-----------( 285      ( 26 Dec 2021 06:32 )             30.4   12-26    146<H>  |  115<H>  |  48<H>  ----------------------------<  89  4.4   |  18<L>  |  1.92<H>    Ca    8.7      26 Dec 2021 06:32          RADIOLOGY & ADDITIONAL STUDIES: none new    PROTEIN CALORIE MALNUTRITION: [ ] mild [ ] moderate [ x] severe  [ ] underweight [ ] morbid obesity    https://www.andeal.org/vault/2440/web/files/ONC/Table_Clinical%20Characteristics%20to%20Document%20Malnutrition-White%20JV%20et%20al%202012.pdf    Height (cm): 160 (12-18-21 @ 20:11), 160 (11-28-21 @ 19:21), 160 (10-26-21 @ 21:52)  Weight (kg): 53.5 (12-18-21 @ 20:11), 63.5 (11-28-21 @ 19:21), 56.7 (10-26-21 @ 21:52)  BMI (kg/m2): 20.9 (12-18-21 @ 20:11), 24.8 (11-28-21 @ 19:21), 22.1 (10-26-21 @ 21:52)    [ ] PPSV2 < or = 30% [ ] significant weight loss [ ] poor nutritional intake [ ] anasarca   Artificial Nutrition [ ]     REFERRALS:   [ ]Chaplaincy  [ ] Hospice  [ ]Child Life  [ ]Social Work  [ ]Case management [ ]Holistic Therapy [ ] Physical Therapy [ ] Dietary   Goals of Care Document:

## 2021-12-27 DIAGNOSIS — R53.2 FUNCTIONAL QUADRIPLEGIA: ICD-10-CM

## 2021-12-27 DIAGNOSIS — R06.00 DYSPNEA, UNSPECIFIED: ICD-10-CM

## 2021-12-27 PROCEDURE — 99232 SBSQ HOSP IP/OBS MODERATE 35: CPT

## 2021-12-27 RX ORDER — MORPHINE SULFATE 50 MG/1
2 CAPSULE, EXTENDED RELEASE ORAL
Refills: 0 | Status: DISCONTINUED | OUTPATIENT
Start: 2021-12-27 | End: 2021-12-27

## 2021-12-27 RX ORDER — HYDROMORPHONE HYDROCHLORIDE 2 MG/ML
0.5 INJECTION INTRAMUSCULAR; INTRAVENOUS; SUBCUTANEOUS
Refills: 0 | Status: DISCONTINUED | OUTPATIENT
Start: 2021-12-27 | End: 2021-12-28

## 2021-12-27 RX ORDER — ACETAMINOPHEN 500 MG
650 TABLET ORAL EVERY 6 HOURS
Refills: 0 | Status: DISCONTINUED | OUTPATIENT
Start: 2021-12-27 | End: 2021-12-28

## 2021-12-27 RX ORDER — HYDROMORPHONE HYDROCHLORIDE 2 MG/ML
0.2 INJECTION INTRAMUSCULAR; INTRAVENOUS; SUBCUTANEOUS EVERY 6 HOURS
Refills: 0 | Status: DISCONTINUED | OUTPATIENT
Start: 2021-12-27 | End: 2021-12-28

## 2021-12-27 RX ADMIN — HYDROMORPHONE HYDROCHLORIDE 0.2 MILLIGRAM(S): 2 INJECTION INTRAMUSCULAR; INTRAVENOUS; SUBCUTANEOUS at 23:26

## 2021-12-27 RX ADMIN — MORPHINE SULFATE 1 MILLIGRAM(S): 50 CAPSULE, EXTENDED RELEASE ORAL at 00:47

## 2021-12-27 RX ADMIN — Medication 1 MILLIGRAM(S): at 23:26

## 2021-12-27 RX ADMIN — Medication 70 MICROGRAM(S): at 22:49

## 2021-12-27 RX ADMIN — Medication 1 MILLIGRAM(S): at 17:07

## 2021-12-27 RX ADMIN — Medication 0.5 MILLIGRAM(S): at 05:17

## 2021-12-27 RX ADMIN — Medication 1 MILLIGRAM(S): at 12:28

## 2021-12-27 RX ADMIN — Medication 5 MILLIGRAM(S): at 10:39

## 2021-12-27 RX ADMIN — MORPHINE SULFATE 1 MILLIGRAM(S): 50 CAPSULE, EXTENDED RELEASE ORAL at 05:17

## 2021-12-27 RX ADMIN — MORPHINE SULFATE 2 MILLIGRAM(S): 50 CAPSULE, EXTENDED RELEASE ORAL at 10:40

## 2021-12-27 RX ADMIN — Medication 0.5 MILLIGRAM(S): at 00:47

## 2021-12-27 RX ADMIN — HYDROMORPHONE HYDROCHLORIDE 0.2 MILLIGRAM(S): 2 INJECTION INTRAMUSCULAR; INTRAVENOUS; SUBCUTANEOUS at 17:06

## 2021-12-27 RX ADMIN — Medication 1 MILLIGRAM(S): at 07:35

## 2021-12-27 NOTE — CHART NOTE - NSCHARTNOTEFT_GEN_A_CORE
Pt in PCU with comfort measures    Will Sign off care. Please recall prn for GI concerns.  Thank You.    Claus Klein PA-C    [facility]  [phone]  After hours and weekend coverage [phone]

## 2021-12-27 NOTE — PROGRESS NOTE ADULT - PROBLEM SELECTOR PROBLEM 7
Discharge planning issues
Palliative care encounter
Discharge planning issues
Discharge planning issues

## 2021-12-27 NOTE — PROGRESS NOTE ADULT - PROBLEM SELECTOR PLAN 7
Spoke with son Elias at the bedside.  Educated as to what to expect.  Questions answered.  Emotional support provided.   Plan was for patient to be d/c back to [facility] Care unit with hospice.  Patient requiring IV meds and has no oral route. Discussed inpatient hospice with Elias. Son amendable to plan.   Ongoing dispo planning with the  Spoke with son Elias at the bedside.  Educated as to what to expect.  Questions answered.  Emotional support provided.   Plan was for patient to be d/c back to [facility] Care unit with hospice.  Patient requiring IV meds and has no oral route. Discussed inpatient hospice with Elias. Son amendable to plan.   Social: The patient has 3 children and 8 grandchildren. She was  twice and she currently has a boyfriend. She has been a psychologist for many years. 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   Ongoing dispo planning with the

## 2021-12-27 NOTE — PROGRESS NOTE ADULT - SUBJECTIVE AND OBJECTIVE BOX
GAP TEAM PALLIATIVE CARE UNIT PROGRESS NOTE:      [  ] Patient on hospice program.    INDICATION FOR PALLIATIVE CARE UNIT SERVICES: symptom management in the setting of a 88y/o F PMH of HTN, hypothyroidism TIA trigeminal neuralgia dementia admitted with metabolic encephalopathy     INTERVAL HPI/OVERNIGHT EVENTS: Chart reviewed. The patient is seen and examined at the bedside. She required PRN Ativan 1mg IV X2 and PRN Morphine 1mg IV X5 within a 24hr period 8am-8am.    DNR on chart:   Allergies    Ambien (Unknown)  aspirin (Unknown)  sulfa drugs (Unknown)    Intolerances    MEDICATIONS  (STANDING):  levothyroxine Injectable 70 MICROGram(s) IV Push at bedtime  LORazepam   Injectable 1 milliGRAM(s) IV Push every 6 hours    MEDICATIONS  (PRN):  acetaminophen  Suppository .. 650 milliGRAM(s) Rectal every 6 hours PRN Temp greater or equal to 38C (100.4F)  bisacodyl Suppository 10 milliGRAM(s) Rectal daily PRN Constipation  hydrALAZINE Injectable 5 milliGRAM(s) IV Push every 6 hours PRN hypertension  LORazepam   Injectable 1 milliGRAM(s) IV Push every 1 hour PRN Anxiety and/or refractory dyspnea  morphine  - Injectable 2 milliGRAM(s) IV Push every 1 hour PRN Moderate to severe pain  morphine  - Injectable 2 milliGRAM(s) IV Push every 1 hour PRN Dyspnea    ITEMS UNCHECKED ARE NOT PRESENT    PRESENT SYMPTOMS: [ X]Unable to obtain due to poor mentation   Source if other than patient:  [ ]Family   [X ]Team     Pain: [ ] yes [ ] no  QOL impact -   Location -                    Aggravating factors -  Quality -  Radiation -  Timing-  Severity (0-10 scale):  Minimal acceptable level (0-10 scale):     Dyspnea:                           [ ]Mild [ ]Moderate [ ]Severe  Anxiety:                             [ ]Mild [ ]Moderate [ ]Severe  Fatigue:                             [ ]Mild [ ]Moderate [ ]Severe  Nausea:                             [ ]Mild [ ]Moderate [ ]Severe  Loss of appetite:              [ ]Mild [ ]Moderate [ ]Severe  Constipation:                    [ ]Mild [ ]Moderate [ ]Severe    PAINAD Score:    [email or web address] (Ctrl +  left click to view)  		  Other Symptoms:  [ ]All other review of systems negative     Palliative Performance Status Version 2:         %         http://npcrc.org/files/news/palliative_performance_scale_ppsv2.pdf  PHYSICAL EXAM:  Vital Signs Last 24 Hrs  T(C): 36.6 (27 Dec 2021 08:20), Max: 36.6 (27 Dec 2021 08:20)  T(F): 97.9 (27 Dec 2021 08:20), Max: 97.9 (27 Dec 2021 08:20)  HR: 104 (27 Dec 2021 08:20) (104 - 104)  BP: 177/91 (27 Dec 2021 08:20) (177/91 - 177/91)  BP(mean): --  RR: 16 (27 Dec 2021 08:20) (16 - 16)  SpO2: 94% (27 Dec 2021 08:20) (94% - 94%) I&O's Summary  GENERAL:  [ ]Alert  [ ]Oriented x   [ ]Lethargic  [ ]Cachexia  [ ]Unarousable  [ ]Verbal  [ ]Non-Verbal  Behavioral:   [ ] Anxiety  [ ] Delirium [ ] Agitation [ ] Other  HEENT:  [ ]Normal   [ ]Dry mouth   [ ]ET Tube/Trach  [ ]Oral lesions  PULMONARY:   [ ]Clear [ ]Tachypnea  [ ]Audible excessive secretions   [ ]Rhonchi        [ ]Right [ ]Left [ ]Bilateral  [ ]Crackles        [ ]Right [ ]Left [ ]Bilateral  [ ]Wheezing     [ ]Right [ ]Left [ ]Bilateral  [ ]Diminshed BS [ ]Right [ ]Left [ ]Bilateral    CARDIOVASCULAR:    [ ]Regular [ ]Irregular [ ]Tachy  [ ]Hamlet [ ]Murmur [ ]Other  GASTROINTESTINAL:  [ ]Soft  [ ]Distended   [ ]+BS  [ ]Non tender [ ]Tender  [ ]PEG [ ]OGT/ NGT   Last BM:     GENITOURINARY:  [ ]Normal [ ] Incontinent   [ ]Oliguria/Anuria   [ ]Hendricks  MUSCULOSKELETAL:   [ ]Normal   [ ]Weakness  [ ]Bed/Wheelchair bound [ ]Edema  NEUROLOGIC:   [ ]No focal deficits  [ ] Cognitive impairment  [ ] Dysphagia [ ]Dysarthria [ ] Paresis [ ]Other   SKIN:   [ ]Normal  [ ]Rash     [ ]Pressure ulcer(s)  [ ]y [ ]n  Present on admission      CRITICAL CARE:  [ ] Shock Present  [ ]Septic [ ]Cardiogenic [ ]Neurologic [ ]Hypovolemic  [ ]  Vasopressors [ ]  Inotropes   [ ] Respiratory failure present [ ] Mechanical Ventilation [ ] Non-invasive ventilatory support [ ] High-Flow  [ ] Acute  [ ] Chronic [ ] Hypoxic  [ ] Hypercarbic [ ] Other  [ ] Other organ failure     LABS:                        9.4    9.62  )-----------( 285      ( 26 Dec 2021 06:32 )             30.4   12-26    146<H>  |  115<H>  |  48<H>  ----------------------------<  89  4.4   |  18<L>  |  1.92<H>    Ca    8.7      26 Dec 2021 06:32          RADIOLOGY & ADDITIONAL STUDIES:    PROTEIN CALORIE MALNUTRITION: [ ] mild [ ] moderate [ ] severe  [ ] underweight [ ] morbid obesity    https://www.andeal.org/vault/2440/web/files/ONC/Table_Clinical%20Characteristics%20to%20Document%20Malnutrition-White%20JV%20et%20al%202012.pdf    Height (cm): 160 (12-18-21 @ 20:11), 160 (11-28-21 @ 19:21), 160 (10-26-21 @ 21:52)  Weight (kg): 53.5 (12-18-21 @ 20:11), 63.5 (11-28-21 @ 19:21), 56.7 (10-26-21 @ 21:52)  BMI (kg/m2): 20.9 (12-18-21 @ 20:11), 24.8 (11-28-21 @ 19:21), 22.1 (10-26-21 @ 21:52)    [ ] PPSV2 < or = 30% [ ] significant weight loss [ ] poor nutritional intake [ ] anasarca   Artificial Nutrition [ ]     REFERRALS:   [ ]Chaplaincy  [ ] Hospice  [ ]Child Life  [ ]Social Work  [ ]Case management [ ]Holistic Therapy [ ] Physical Therapy [ ] Dietary   Goals of Care Document:    GAP TEAM PALLIATIVE CARE UNIT PROGRESS NOTE:      [  ] Patient on hospice program.    INDICATION FOR PALLIATIVE CARE UNIT SERVICES: symptom management in the setting of a 88y/o F PMH of HTN, hypothyroidism TIA trigeminal neuralgia dementia admitted with metabolic encephalopathy     INTERVAL HPI/OVERNIGHT EVENTS: Chart reviewed. The patient is seen and examined at the bedside. She required PRN Ativan 1mg IV X2 and PRN Morphine 1mg IV X5 within a 24hr period 8am-8am.    DNR on chart:   Allergies    Ambien (Unknown)  aspirin (Unknown)  sulfa drugs (Unknown)    Intolerances    MEDICATIONS  (STANDING):  levothyroxine Injectable 70 MICROGram(s) IV Push at bedtime  LORazepam   Injectable 1 milliGRAM(s) IV Push every 6 hours    MEDICATIONS  (PRN):  acetaminophen  Suppository .. 650 milliGRAM(s) Rectal every 6 hours PRN Temp greater or equal to 38C (100.4F)  bisacodyl Suppository 10 milliGRAM(s) Rectal daily PRN Constipation  hydrALAZINE Injectable 5 milliGRAM(s) IV Push every 6 hours PRN hypertension  LORazepam   Injectable 1 milliGRAM(s) IV Push every 1 hour PRN Anxiety and/or refractory dyspnea  morphine  - Injectable 2 milliGRAM(s) IV Push every 1 hour PRN Moderate to severe pain  morphine  - Injectable 2 milliGRAM(s) IV Push every 1 hour PRN Dyspnea    ITEMS UNCHECKED ARE NOT PRESENT    PRESENT SYMPTOMS: [ X]Unable to obtain due to poor mentation   Source if other than patient:  [ ]Family   [X ]Team     Pain: [ ] yes [X ] no see pain ad score  QOL impact -   Location -                    Aggravating factors -  Quality -  Radiation -  Timing-  Severity (0-10 scale):  Minimal acceptable level (0-10 scale):     Dyspnea:                           [ ]Mild [ ]Moderate [ ]Severe  Anxiety:                             [ ]Mild [ ]Moderate [ ]Severe  Fatigue:                             [ ]Mild [ ]Moderate [ ]Severe  Nausea:                             [ ]Mild [ ]Moderate [ ]Severe  Loss of appetite:              [ ]Mild [ ]Moderate [ ]Severe  Constipation:                    [ ]Mild [ ]Moderate [ ]Severe    PAINAD Score: 0    [email or web address]/[email or web address] (Ctrl +  left click to view)  		  Other Symptoms:  [ ]All other review of systems negative- unable to assess     Palliative Performance Status Version 2: 10%         http://npcrc.org/files/news/palliative_performance_scale_ppsv2.pdf  PHYSICAL EXAM:  Vital Signs Last 24 Hrs  T(C): 36.6 (27 Dec 2021 08:20), Max: 36.6 (27 Dec 2021 08:20)  T(F): 97.9 (27 Dec 2021 08:20), Max: 97.9 (27 Dec 2021 08:20)  HR: 104 (27 Dec 2021 08:20) (104 - 104)  BP: 177/91 (27 Dec 2021 08:20) (177/91 - 177/91)  BP(mean): --  RR: 16 (27 Dec 2021 08:20) (16 - 16)  SpO2: 94% (27 Dec 2021 08:20) (94% - 94%) I&O's Summary  GENERAL:  [ ]Alert  [ ]Oriented x   [ ]Lethargic  [ ]Cachexia  [ X]Unarousable  [ ]Verbal  [X ]Non-Verbal  Behavioral:   [ ] Anxiety  [ ] Delirium [ ] Agitation [ ] Other  HEENT:  [ ]Normal   [ ]Dry mouth   [ ]ET Tube/Trach  [ ]Oral lesions  PULMONARY:   [ ]Clear [ ]Tachypnea  [ ]Audible excessive secretions   [ ]Rhonchi        [ ]Right [ ]Left [ ]Bilateral  [ ]Crackles        [ ]Right [ ]Left [ ]Bilateral  [ ]Wheezing     [ ]Right [ ]Left [ ]Bilateral  [ X]Diminshed BS [ ]Right [ ]Left [X ]Bilateral    CARDIOVASCULAR:    [ ]Regular [ ]Irregular [ X]Tachy  [ ]Hamlet [ ]Murmur [ ]Other  GASTROINTESTINAL:  [ X]Soft  [X ]Distended   [ X]+BS  [X ]Non tender [ ]Tender  [ ]PEG [ ]OGT/ NGT   Last BM:  12/25/21  GENITOURINARY:  [ ]Normal [ X] Incontinent   [ ]Oliguria/Anuria   [ ]Hendricks [X]Other- external catheter   MUSCULOSKELETAL:   [ ]Normal   [X ]Weakness  [ ]Bed/Wheelchair bound [ ]Edema  NEUROLOGIC:   [ ]No focal deficits  [X ] Cognitive impairment  [ ] Dysphagia [ ]Dysarthria [ ] Paresis [ ]Other   SKIN:   [ ]Normal  [ ]Rash     [X ]Pressure ulcer(s)  [X ]y [ ]n  Present on admission  b/l heel stage 1 and Sacrum stage 1. Please see nursing assessment for further details for which I have reviewed.     CRITICAL CARE:  [ ] Shock Present  [ ]Septic [ ]Cardiogenic [ ]Neurologic [ ]Hypovolemic  [ ]  Vasopressors [ ]  Inotropes   [ ] Respiratory failure present [ ] Mechanical Ventilation [ ] Non-invasive ventilatory support [ ] High-Flow  [ ] Acute  [ ] Chronic [ ] Hypoxic  [ ] Hypercarbic [ ] Other  [ ] Other organ failure     LABS:                        9.4    9.62  )-----------( 285      ( 26 Dec 2021 06:32 )             30.4   12-26    146<H>  |  115<H>  |  48<H>  ----------------------------<  89  4.4   |  18<L>  |  1.92<H>    Ca    8.7      26 Dec 2021 06:32      RADIOLOGY & ADDITIONAL STUDIES: None new    PROTEIN CALORIE MALNUTRITION: [ ] mild [ ] moderate [X ] severe- please see nutritionist note  [ ] underweight [ ] morbid obesity    https://www.andeal.org/vault/2440/web/files/ONC/Table_Clinical%20Characteristics%20to%20Document%20Malnutrition-White%20JV%20et%20al%202012.pdf    Height (cm): 160 (12-18-21 @ 20:11), 160 (11-28-21 @ 19:21), 160 (10-26-21 @ 21:52)  Weight (kg): 53.5 (12-18-21 @ 20:11), 63.5 (11-28-21 @ 19:21), 56.7 (10-26-21 @ 21:52)  BMI (kg/m2): 20.9 (12-18-21 @ 20:11), 24.8 (11-28-21 @ 19:21), 22.1 (10-26-21 @ 21:52)    [X ] PPSV2 < or = 30% [ ] significant weight loss [ ] poor nutritional intake [ ] anasarca   Artificial Nutrition [ ]     REFERRALS:   [ ]Chaplaincy  [ ] Hospice  [ ]Child Life  [ X]Social Work  [ ]Case management [ ]Holistic Therapy [ ] Physical Therapy [ ] Dietary   Goals of Care Document:    GAP TEAM PALLIATIVE CARE UNIT PROGRESS NOTE:      [  ] Patient on hospice program.    INDICATION FOR PALLIATIVE CARE UNIT SERVICES: symptom management in the setting of a 89y/o F PMH of HTN, hypothyroidism TIA trigeminal neuralgia dementia admitted with metabolic encephalopathy     INTERVAL HPI/OVERNIGHT EVENTS: Chart reviewed. The patient is seen and examined at the bedside. She required PRN Ativan 1mg IV X2 and PRN Morphine 1mg IV X5 within a 24hr period 8am-8am.    DNR on chart:   Allergies    Ambien (Unknown)  aspirin (Unknown)  sulfa drugs (Unknown)    Intolerances    MEDICATIONS  (STANDING):  levothyroxine Injectable 70 MICROGram(s) IV Push at bedtime  LORazepam   Injectable 1 milliGRAM(s) IV Push every 6 hours    MEDICATIONS  (PRN):  acetaminophen  Suppository .. 650 milliGRAM(s) Rectal every 6 hours PRN Temp greater or equal to 38C (100.4F)  bisacodyl Suppository 10 milliGRAM(s) Rectal daily PRN Constipation  hydrALAZINE Injectable 5 milliGRAM(s) IV Push every 6 hours PRN hypertension  LORazepam   Injectable 1 milliGRAM(s) IV Push every 1 hour PRN Anxiety and/or refractory dyspnea  morphine  - Injectable 2 milliGRAM(s) IV Push every 1 hour PRN Moderate to severe pain  morphine  - Injectable 2 milliGRAM(s) IV Push every 1 hour PRN Dyspnea    ITEMS UNCHECKED ARE NOT PRESENT    PRESENT SYMPTOMS: [ X]Unable to obtain due to poor mentation   Source if other than patient:  [ ]Family   [X ]Team     Pain: [ ] yes [X ] no see pain ad score  QOL impact -   Location -                    Aggravating factors -  Quality -  Radiation -  Timing-  Severity (0-10 scale):  Minimal acceptable level (0-10 scale):     Dyspnea:                           [ ]Mild [ ]Moderate [ ]Severe  Anxiety:                             [ ]Mild [ ]Moderate [ ]Severe  Fatigue:                             [ ]Mild [ ]Moderate [ ]Severe  Nausea:                             [ ]Mild [ ]Moderate [ ]Severe  Loss of appetite:              [ ]Mild [ ]Moderate [ ]Severe  Constipation:                    [ ]Mild [ ]Moderate [ ]Severe    PAINAD Score: 0    [email or web address] (Ctrl +  left click to view)  		  Other Symptoms:  [ ]All other review of systems negative- unable to assess     Palliative Performance Status Version 2: 10%         http://npcrc.org/files/news/palliative_performance_scale_ppsv2.pdf  PHYSICAL EXAM:  Vital Signs Last 24 Hrs  T(C): 36.6 (27 Dec 2021 08:20), Max: 36.6 (27 Dec 2021 08:20)  T(F): 97.9 (27 Dec 2021 08:20), Max: 97.9 (27 Dec 2021 08:20)  HR: 104 (27 Dec 2021 08:20) (104 - 104)  BP: 177/91 (27 Dec 2021 08:20) (177/91 - 177/91)  BP(mean): --  RR: 16 (27 Dec 2021 08:20) (16 - 16)  SpO2: 94% (27 Dec 2021 08:20) (94% - 94%) I&O's Summary  GENERAL:  [ ]Alert  [ ]Oriented x   [ ]Lethargic  [ ]Cachexia  [ X]Unarousable  [ ]Verbal  [X ]Non-Verbal  Behavioral:   [ ] Anxiety  [ ] Delirium [ ] Agitation [ ] Other  HEENT:  [ ]Normal   [ ]Dry mouth   [ ]ET Tube/Trach  [ ]Oral lesions  PULMONARY:   [ ]Clear [ ]Tachypnea  [ ]Audible excessive secretions   [ ]Rhonchi        [ ]Right [ ]Left [ ]Bilateral  [ ]Crackles        [ ]Right [ ]Left [ ]Bilateral  [ ]Wheezing     [ ]Right [ ]Left [ ]Bilateral  [ X]Diminshed BS [ ]Right [ ]Left [X ]Bilateral    CARDIOVASCULAR:    [ ]Regular [ ]Irregular [ X]Tachy  [ ]Hamlet [ ]Murmur [ ]Other  GASTROINTESTINAL:  [ X]Soft  [X ]Distended   [ X]+BS  [X ]Non tender [ ]Tender  [ ]PEG [ ]OGT/ NGT   Last BM:  12/25/21  GENITOURINARY:  [ ]Normal [ X] Incontinent   [ ]Oliguria/Anuria   [ ]Hendricks [X]Other- external catheter   MUSCULOSKELETAL:   [ ]Normal   [X ]Weakness  [ ]Bed/Wheelchair bound [ ]Edema  NEUROLOGIC:   [ ]No focal deficits  [X ] Cognitive impairment  [ ] Dysphagia [ ]Dysarthria [ ] Paresis [ ]Other   SKIN:   [ ]Normal  [ ]Rash     [X ]Pressure ulcer(s)  [X ]y [ ]n  Present on admission  b/l heel stage 1 and Sacrum stage 1. Please see nursing assessment for further details for which I have reviewed.     CRITICAL CARE:  [ ] Shock Present  [ ]Septic [ ]Cardiogenic [ ]Neurologic [ ]Hypovolemic  [ ]  Vasopressors [ ]  Inotropes   [ ] Respiratory failure present [ ] Mechanical Ventilation [ ] Non-invasive ventilatory support [ ] High-Flow  [ ] Acute  [ ] Chronic [ ] Hypoxic  [ ] Hypercarbic [ ] Other  [ ] Other organ failure     LABS:                        9.4    9.62  )-----------( 285      ( 26 Dec 2021 06:32 )             30.4   12-26    146<H>  |  115<H>  |  48<H>  ----------------------------<  89  4.4   |  18<L>  |  1.92<H>    Ca    8.7      26 Dec 2021 06:32      RADIOLOGY & ADDITIONAL STUDIES: None new    PROTEIN CALORIE MALNUTRITION: [ ] mild [ ] moderate [X ] severe- please see nutritionist note  [ ] underweight [ ] morbid obesity    https://www.andeal.org/vault/2440/web/files/ONC/Table_Clinical%20Characteristics%20to%20Document%20Malnutrition-White%20JV%20et%20al%202012.pdf    Height (cm): 160 (12-18-21 @ 20:11), 160 (11-28-21 @ 19:21), 160 (10-26-21 @ 21:52)  Weight (kg): 53.5 (12-18-21 @ 20:11), 63.5 (11-28-21 @ 19:21), 56.7 (10-26-21 @ 21:52)  BMI (kg/m2): 20.9 (12-18-21 @ 20:11), 24.8 (11-28-21 @ 19:21), 22.1 (10-26-21 @ 21:52)    [X ] PPSV2 < or = 30% [ ] significant weight loss [ ] poor nutritional intake [ ] anasarca   Artificial Nutrition [ ]     REFERRALS:   [ ]Chaplaincy  [ ] Hospice  [ ]Child Life  [ X]Social Work  [ ]Case management [ ]Holistic Therapy [ ] Physical Therapy [ ] Dietary   Goals of Care Document:     Goals of Care:    GOALS OF CARE:  · Participants	Patient; Family  · Child(dale)	Joy Griffin     Conversation Discussion:  · Conversation	Diagnosis; Prognosis; MOLST Discussed; Treatment Options  · Conversation Details	Patient unable to participate in goals of care discussion due to cognitive impairment in setting of advanced dementia. Goals of care discussion with patient's children Joy and Elias. Bryan unavailable at this time. Joy and Elias shared that patient has advanced dementia and has been in memory unit at [facility]. We discussed patient's overall condition and goals of care. They shared that patient would not want artifical nutrition and their goal is to keep her as comfortable as possible. Elias stated he has been working to transfer patient to [facility] where hospice services are available. They are in agreement with hospice referral. Confirmed DNR/DNI. MOLST was completed by primary team. All questions answered and emotional support provided.     Treatment Guidelines:  · Decision Maker	Surrogate  · Treatment Guidelines	DNR Order; Comfort measures only

## 2021-12-28 ENCOUNTER — TRANSCRIPTION ENCOUNTER (OUTPATIENT)
Age: 86
End: 2021-12-28

## 2021-12-28 VITALS
HEART RATE: 97 BPM | SYSTOLIC BLOOD PRESSURE: 92 MMHG | OXYGEN SATURATION: 92 % | TEMPERATURE: 98 F | RESPIRATION RATE: 12 BRPM | DIASTOLIC BLOOD PRESSURE: 59 MMHG

## 2021-12-28 LAB — SARS-COV-2 RNA SPEC QL NAA+PROBE: SIGNIFICANT CHANGE UP

## 2021-12-28 PROCEDURE — 74177 CT ABD & PELVIS W/CONTRAST: CPT | Mod: MG

## 2021-12-28 PROCEDURE — 82330 ASSAY OF CALCIUM: CPT

## 2021-12-28 PROCEDURE — 84295 ASSAY OF SERUM SODIUM: CPT

## 2021-12-28 PROCEDURE — 97162 PT EVAL MOD COMPLEX 30 MIN: CPT

## 2021-12-28 PROCEDURE — 86480 TB TEST CELL IMMUN MEASURE: CPT

## 2021-12-28 PROCEDURE — 87040 BLOOD CULTURE FOR BACTERIA: CPT

## 2021-12-28 PROCEDURE — 73562 X-RAY EXAM OF KNEE 3: CPT

## 2021-12-28 PROCEDURE — 83735 ASSAY OF MAGNESIUM: CPT

## 2021-12-28 PROCEDURE — 82947 ASSAY GLUCOSE BLOOD QUANT: CPT

## 2021-12-28 PROCEDURE — 85018 HEMOGLOBIN: CPT

## 2021-12-28 PROCEDURE — 82746 ASSAY OF FOLIC ACID SERUM: CPT

## 2021-12-28 PROCEDURE — 84132 ASSAY OF SERUM POTASSIUM: CPT

## 2021-12-28 PROCEDURE — 80053 COMPREHEN METABOLIC PANEL: CPT

## 2021-12-28 PROCEDURE — 92610 EVALUATE SWALLOWING FUNCTION: CPT

## 2021-12-28 PROCEDURE — 81001 URINALYSIS AUTO W/SCOPE: CPT

## 2021-12-28 PROCEDURE — 82607 VITAMIN B-12: CPT

## 2021-12-28 PROCEDURE — 86850 RBC ANTIBODY SCREEN: CPT

## 2021-12-28 PROCEDURE — 86900 BLOOD TYPING SEROLOGIC ABO: CPT

## 2021-12-28 PROCEDURE — 72125 CT NECK SPINE W/O DYE: CPT | Mod: MG

## 2021-12-28 PROCEDURE — 71045 X-RAY EXAM CHEST 1 VIEW: CPT

## 2021-12-28 PROCEDURE — 85025 COMPLETE CBC W/AUTO DIFF WBC: CPT

## 2021-12-28 PROCEDURE — 86923 COMPATIBILITY TEST ELECTRIC: CPT

## 2021-12-28 PROCEDURE — 99285 EMERGENCY DEPT VISIT HI MDM: CPT

## 2021-12-28 PROCEDURE — 72190 X-RAY EXAM OF PELVIS: CPT

## 2021-12-28 PROCEDURE — 82435 ASSAY OF BLOOD CHLORIDE: CPT

## 2021-12-28 PROCEDURE — 36430 TRANSFUSION BLD/BLD COMPNT: CPT

## 2021-12-28 PROCEDURE — 82728 ASSAY OF FERRITIN: CPT

## 2021-12-28 PROCEDURE — 86901 BLOOD TYPING SEROLOGIC RH(D): CPT

## 2021-12-28 PROCEDURE — U0005: CPT

## 2021-12-28 PROCEDURE — 96374 THER/PROPH/DIAG INJ IV PUSH: CPT

## 2021-12-28 PROCEDURE — G1004: CPT

## 2021-12-28 PROCEDURE — 87086 URINE CULTURE/COLONY COUNT: CPT

## 2021-12-28 PROCEDURE — 70450 CT HEAD/BRAIN W/O DYE: CPT

## 2021-12-28 PROCEDURE — 71260 CT THORAX DX C+: CPT

## 2021-12-28 PROCEDURE — 93005 ELECTROCARDIOGRAM TRACING: CPT

## 2021-12-28 PROCEDURE — 83605 ASSAY OF LACTIC ACID: CPT

## 2021-12-28 PROCEDURE — 82550 ASSAY OF CK (CPK): CPT

## 2021-12-28 PROCEDURE — P9016: CPT

## 2021-12-28 PROCEDURE — 85027 COMPLETE CBC AUTOMATED: CPT

## 2021-12-28 PROCEDURE — 36415 COLL VENOUS BLD VENIPUNCTURE: CPT

## 2021-12-28 PROCEDURE — 85014 HEMATOCRIT: CPT

## 2021-12-28 PROCEDURE — 99239 HOSP IP/OBS DSCHRG MGMT >30: CPT

## 2021-12-28 PROCEDURE — 83540 ASSAY OF IRON: CPT

## 2021-12-28 PROCEDURE — U0003: CPT

## 2021-12-28 PROCEDURE — 83550 IRON BINDING TEST: CPT

## 2021-12-28 PROCEDURE — 84100 ASSAY OF PHOSPHORUS: CPT

## 2021-12-28 PROCEDURE — 82962 GLUCOSE BLOOD TEST: CPT

## 2021-12-28 PROCEDURE — 80048 BASIC METABOLIC PNL TOTAL CA: CPT

## 2021-12-28 PROCEDURE — 74018 RADEX ABDOMEN 1 VIEW: CPT

## 2021-12-28 PROCEDURE — 82803 BLOOD GASES ANY COMBINATION: CPT

## 2021-12-28 RX ORDER — LEVOTHYROXINE SODIUM 125 MCG
1 TABLET ORAL
Qty: 0 | Refills: 0 | DISCHARGE

## 2021-12-28 RX ORDER — FERROUS SULFATE 325(65) MG
1 TABLET ORAL
Qty: 0 | Refills: 0 | DISCHARGE

## 2021-12-28 RX ORDER — HYDRALAZINE HCL 50 MG
5 TABLET ORAL
Qty: 0 | Refills: 0 | DISCHARGE
Start: 2021-12-28

## 2021-12-28 RX ORDER — CARBAMAZEPINE 200 MG
1 TABLET ORAL
Qty: 0 | Refills: 0 | DISCHARGE

## 2021-12-28 RX ORDER — HYDROMORPHONE HYDROCHLORIDE 2 MG/ML
0.5 INJECTION INTRAMUSCULAR; INTRAVENOUS; SUBCUTANEOUS
Qty: 0 | Refills: 0 | DISCHARGE
Start: 2021-12-28

## 2021-12-28 RX ORDER — HYDROMORPHONE HYDROCHLORIDE 2 MG/ML
0.2 INJECTION INTRAMUSCULAR; INTRAVENOUS; SUBCUTANEOUS
Qty: 0 | Refills: 0 | DISCHARGE
Start: 2021-12-28

## 2021-12-28 RX ORDER — ROBINUL 0.2 MG/ML
0.4 INJECTION INTRAMUSCULAR; INTRAVENOUS
Qty: 0 | Refills: 0 | DISCHARGE
Start: 2021-12-28

## 2021-12-28 RX ORDER — ROBINUL 0.2 MG/ML
0.4 INJECTION INTRAMUSCULAR; INTRAVENOUS EVERY 6 HOURS
Refills: 0 | Status: DISCONTINUED | OUTPATIENT
Start: 2021-12-28 | End: 2021-12-28

## 2021-12-28 RX ORDER — LEVOTHYROXINE SODIUM 125 MCG
70 TABLET ORAL
Qty: 0 | Refills: 0 | DISCHARGE
Start: 2021-12-28

## 2021-12-28 RX ORDER — ACETAMINOPHEN 500 MG
1 TABLET ORAL
Qty: 0 | Refills: 0 | DISCHARGE
Start: 2021-12-28

## 2021-12-28 RX ORDER — CHOLECALCIFEROL (VITAMIN D3) 125 MCG
2 CAPSULE ORAL
Qty: 0 | Refills: 0 | DISCHARGE

## 2021-12-28 RX ADMIN — HYDROMORPHONE HYDROCHLORIDE 0.5 MILLIGRAM(S): 2 INJECTION INTRAMUSCULAR; INTRAVENOUS; SUBCUTANEOUS at 15:33

## 2021-12-28 RX ADMIN — Medication 1 MILLIGRAM(S): at 05:48

## 2021-12-28 RX ADMIN — HYDROMORPHONE HYDROCHLORIDE 0.2 MILLIGRAM(S): 2 INJECTION INTRAMUSCULAR; INTRAVENOUS; SUBCUTANEOUS at 17:19

## 2021-12-28 RX ADMIN — ROBINUL 0.4 MILLIGRAM(S): 0.2 INJECTION INTRAMUSCULAR; INTRAVENOUS at 14:02

## 2021-12-28 RX ADMIN — Medication 1 MILLIGRAM(S): at 11:14

## 2021-12-28 RX ADMIN — HYDROMORPHONE HYDROCHLORIDE 0.2 MILLIGRAM(S): 2 INJECTION INTRAMUSCULAR; INTRAVENOUS; SUBCUTANEOUS at 05:48

## 2021-12-28 RX ADMIN — HYDROMORPHONE HYDROCHLORIDE 0.2 MILLIGRAM(S): 2 INJECTION INTRAMUSCULAR; INTRAVENOUS; SUBCUTANEOUS at 11:14

## 2021-12-28 RX ADMIN — Medication 1 MILLIGRAM(S): at 17:19

## 2021-12-28 RX ADMIN — Medication 1 MILLIGRAM(S): at 15:33

## 2021-12-28 NOTE — PROGRESS NOTE ADULT - ATTENDING COMMENTS
advanced dementia with delirium.  aggressive bowel regimen as recent ileus. goal is back to [place] with hospice
88 y/o F PMH of COPD, HTN, HLD, hypothyroidism, TIA, trigeminal neuralgia, T2DM, and dementia admitted with an unwitnessed fall adm with acute metabolic encephalopathy 2/2 constipation and ANGELICA.  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 treated for HTN urgency.
end stage dementia with terminal agitation.  start ativan atc and prn .  morphine iv prn pain, sob.  goal is for comfort
89 y/o F PMH of COPD, HTN, HLD, hypothyroidism, TIA, trigeminal neuralgia, T2DM, and dementia who was [facility] to the ED with an unwitnessed fall adm with acute metabolic encephalopathy 2/2 constipation, ANGELICA, and further workup for falls.  She was transferred to PCU for symptom directed care at the end of life.  See C note above.  Patient assessment and plan discussed on interdisciplinary team rounds today.

## 2021-12-28 NOTE — PROGRESS NOTE ADULT - PROBLEM SELECTOR PROBLEM 4
TIA on medication
Acute metabolic encephalopathy
Advanced dementia
TIA on medication
ACP (advance care planning)
TIA on medication
Acute metabolic encephalopathy
TIA on medication

## 2021-12-28 NOTE — DISCHARGE NOTE NURSING/CASE MANAGEMENT/SOCIAL WORK - NSDCPEFALRISK_GEN_ALL_CORE
For information on Fall & Injury Prevention, visit: [email or web address] OR  [email or web address] OR  https://www.cdc.gov/steadi/patient.html

## 2021-12-28 NOTE — PROGRESS NOTE ADULT - PROBLEM SELECTOR PLAN 3
unwitnessed fall, unclear etiology given pt's mental status  recent TTE w/ stage 1 dCHF, otherwise unremarkable
unwitnessed fall, unclear etiology given pt's mental status  recent TTE w/ stage 1 dCHF, otherwise unremarkable
unwitnessed fall, unclear etiology given pt's mental status  recent TTE w/ stage 1 dCHF, otherwise unremarkable  - telemetry x 24 hours  -
c/w bowel regimen PRN  resolving ileus per imaging  unable to take PO anymore
unwitnessed fall, unclear etiology given pt's mental status  recent TTE w/ stage 1 dCHF, otherwise unremarkable
unwitnessed fall, unclear etiology given pt's mental status  recent TTE w/ stage 1 dCHF, otherwise unremarkable
at least FAST 7C  updated daughter Joy about prognosis  nil oral now due to mentation, waxing and waning
unwitnessed fall, unclear etiology given pt's mental status  recent TTE w/ stage 1 dCHF, otherwise unremarkable  - telemetry x 24 hours  -
Advanced dementia  Monitor for constipation, urinary retention, pain, hunger, thirst, etc.  Promote sleep wake cycle and reorientation as indicated.   - Continue with PRN Ativan 1mg IV (0 required within a 24hr period 8am-8am)  - Continue with Ativan to 1mg IV q6hr ATC
Advanced dementia  Monitor for constipation, urinary retention, pain, hunger, thirst, etc.  Promote sleep wake cycle and reorientation as indicated.   - Continue with PRN Ativan 1mg IV (2 required within a 24hr period 8am-8am)  - Increase ATC Ativan to 1mg IV q6hr

## 2021-12-28 NOTE — PROGRESS NOTE ADULT - PROBLEM SELECTOR PROBLEM 6
DM (diabetes mellitus)
Palliative care encounter
Palliative care encounter
DM (diabetes mellitus)
DM (diabetes mellitus)
Advanced dementia
DM (diabetes mellitus)

## 2021-12-28 NOTE — DISCHARGE NOTE NURSING/CASE MANAGEMENT/SOCIAL WORK - NSDCVIVACCINE_GEN_ALL_CORE_FT
Tdap; 18-Jul-2021 14:56; Malaika Tate (RN); [employer]; [id number] (Exp. Date: 01-Oct-2022); IntraMuscular; Deltoid Right.; 0.5 milliLiter(s); VIS (VIS Published: 09-May-2013, VIS Presented: 18-Jul-2021);

## 2021-12-28 NOTE — DISCHARGE NOTE PROVIDER - NSDCCPCAREPLAN_GEN_ALL_CORE_FT
PRINCIPAL DISCHARGE DIAGNOSIS  Diagnosis: Acute metabolic encephalopathy  Assessment and Plan of Treatment: You presented to the hospital  with a fall and altered mental status. You were diagnosed with dehydration/ acute kidney injury (ANGELICA), constipation and delerium superimposed on dementia. You were treated with IV fluids for the dehydration and the ANGELICA  Family opting for symptom management and comfort care.  Please continue with your care at [facility]        SECONDARY DISCHARGE DIAGNOSES  Diagnosis: Fecal impaction of colon  Assessment and Plan of Treatment: You were seen by the gastroenterologist for your constipation. s/p fecal impaction. You were placed on a bowel regimen with improvement.  Family opting for symptom management and comfort care.  Please continue with your care at [facility]

## 2021-12-28 NOTE — PROGRESS NOTE ADULT - NUTRITIONAL ASSESSMENT
This patient has been assessed with a concern for Malnutrition and has been determined to have a diagnosis/diagnoses of Severe protein-calorie malnutrition.    This patient is being managed with:   Diet DASH/TLC-  Sodium & Cholesterol Restricted  Supplement Feeding Modality:  Oral  Glucerna Shake Cans or Servings Per Day:  2       Frequency:  Daily  Entered: Dec 21 2021 12:25PM    
This patient has been assessed with a concern for Malnutrition and has been determined to have a diagnosis/diagnoses of Severe protein-calorie malnutrition.    This patient is being managed with:   Diet DASH/TLC-  Sodium & Cholesterol Restricted  Supplement Feeding Modality:  Oral  Glucerna Shake Cans or Servings Per Day:  2       Frequency:  Daily  Entered: Dec 21 2021 12:25PM    
This patient has been assessed with a concern for Malnutrition and has been determined to have a diagnosis/diagnoses of Severe protein-calorie malnutrition.    This patient is being managed with:   Diet NPO-  Entered: Dec 27 2021  9:52AM    
This patient has been assessed with a concern for Malnutrition and has been determined to have a diagnosis/diagnoses of Severe protein-calorie malnutrition.    This patient is being managed with:   Diet DASH/TLC-  Sodium & Cholesterol Restricted  Supplement Feeding Modality:  Oral  Glucerna Shake Cans or Servings Per Day:  2       Frequency:  Daily  Entered: Dec 21 2021 12:25PM    
This patient has been assessed with a concern for Malnutrition and has been determined to have a diagnosis/diagnoses of Severe protein-calorie malnutrition.    This patient is being managed with:   Diet DASH/TLC-  Sodium & Cholesterol Restricted  Supplement Feeding Modality:  Oral  Glucerna Shake Cans or Servings Per Day:  2       Frequency:  Daily  Entered: Dec 21 2021 12:25PM    
This patient has been assessed with a concern for Malnutrition and has been determined to have a diagnosis/diagnoses of Severe protein-calorie malnutrition.    This patient is being managed with:   Diet NPO-  Entered: Dec 27 2021  9:52AM

## 2021-12-28 NOTE — PROGRESS NOTE ADULT - ASSESSMENT
88 y/o F PMH of COPD, HTN, HLD, hypothyroidism, TIA, trigeminal neuralgia, T2DM, and dementia who was BIBEMS from [facility] to the ED with an unwitnessed fall  Anemia - without overt/brisk GI bleed; brisk Hgb response to 2 units PRBCs 12/18, Hgb stable  CT CAP - without evidence of bleed    Found to have large amount of stool in sigmoid and rectum on CT in ED 12/18- reported fecal disimpaction and enema in ER with passage of stool.    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 on 12/21 clinical exam - improved   -replete lytes to goal K 4-4.5 and Mg >2  -Synthroid management per primary team (severe hypothyroid state can decrease GI motility)  -bowel regimen: Miralax, Senna and Dulcolax supp HS  -monitor clinically  -ok for PO diet as tolerated, with supplements    Anemia - without overt/brisk GI bleed; Hgb stable after transfusions  B12 and Folate WNL  Iron studies not c/w iron deficiency  -PO Iron supplements as ordered  -PPI (ok for PO dosing) given CT findings suggestive of reflux esophagitis  -no invasive GI work-up given advanced dementia; in line with family wishes for supportive care/hospice plans    Discussed with Medicine ACP  Plans for hospice care per family wishes    Claus Klein PA-C    [facility]  [phone]  After hours and weekend coverage [phone]  
88 y/o F PMH of COPD, HTN, HLD, hypothyroidism, TIA, trigeminal neuralgia, T2DM, and dementia who was BIBEMS from [facility] to the ED with an unwitnessed fall adm with acute metabolic encephalopathy 2/2 constipation, ANGELICA, and further workup for falls. Palliative consulted for goals of care.
89-year-old female admitted with an unwitnessed fall found to be severely anemic.  She has evidence of mild to moderate aortic stenosis by echocardiography but no evidence for an acute coronary syndrome at present.  Her blood pressure had been low, now elevated    Recommend    - Continue close observation  - Continue antihypertensive therapy  - cont norvasc 10  -please increase losartan to 50, as BP remains elevated and her renal fxn is improving  - Continue statin therapy  - asa had been held in setting of hb drop.  - Conservative management is warranted at this time with no need for additional cardiac testing.  - will follow with you
89 y/o F PMH of COPD, HTN, HLD, hypothyroidism, TIA, trigeminal neuralgia, T2DM, and dementia who was BIBEMS from [place] to the ED with an unwitnessed fall  Anemia - without overt/brisk GI bleed; brisk Hgb response to 2 units PRBCs 12/18, Hgb stable  CT CAP - without evidence of bleed    Found to have large amount of stool in sigmoid and rectum on CT in ED 12/18- reported fecal disimpaction and enema in ER with passage of stool.    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 Continue Dulcolax supp HS  -monitor clinically  -ok for PO diet as tolerated, with supplements    Anemia - without overt/brisk GI bleed; Hgb stable after transfusions  B12 and Folate WNL  Iron studies not c/w iron deficiency  -PPI (ok for PO dosing) given CT findings suggestive of reflux esophagitis  -no invasive GI work-up given advanced dementia; in line with family wishes for supportive care/hospice plans    Discussed with Medicine ACP  Plans for hospice care per family wishes    Please call over holiday weekend 12/24 - 12/26  prn with GI concerns   GI service : [phone]      Claus Klein PA-C    [facility]  [phone]  After hours and weekend coverage [phone]      
Past Medical History:   Diagnosis Date    Anticoagulant long-term use     ASD (atrial septal defect)     Atrial fibrillation        History reviewed. No pertinent surgical history.    Review of patient's allergies indicates:  No Known Allergies    No current facility-administered medications on file prior to encounter.      Current Outpatient Medications on File Prior to Encounter   Medication Sig    pantoprazole (PROTONIX) 40 MG tablet Take 1 tablet (40 mg total) by mouth once daily.    propafenone (RYTHMOL) 300 MG tablet Take 1 tablet (300 mg total) by mouth every 8 (eight) hours.    rivaroxaban (XARELTO) 20 mg Tab Take 20 mg by mouth.     Family History     None        Tobacco Use    Smoking status: Never Smoker    Smokeless tobacco: Never Used   Substance and Sexual Activity    Alcohol use: Yes     Alcohol/week: 1.2 oz     Types: 2 Glasses of wine per week     Comment: occassional    Drug use: No    Sexual activity: Not on file     Review of Systems   Constitution: Negative for chills, fever, weakness and malaise/fatigue.   HENT: Negative for congestion and nosebleeds.    Eyes: Negative for blurred vision.   Cardiovascular: Negative for chest pain, dyspnea on exertion, irregular heartbeat, leg swelling, near-syncope, orthopnea, palpitations, paroxysmal nocturnal dyspnea and syncope.   Respiratory: Negative for cough, hemoptysis, shortness of breath, sleep disturbances due to breathing, sputum production and wheezing.    Endocrine: Negative for polyphagia.   Hematologic/Lymphatic: Negative for bleeding problem. Does not bruise/bleed easily.   Skin: Negative for itching and rash.   Musculoskeletal: Negative for back pain, joint swelling, muscle cramps and muscle weakness.   Gastrointestinal: Negative for bloating, abdominal pain, hematemesis, hematochezia, nausea and vomiting.   Genitourinary: Negative for dysuria and hematuria.   Neurological: Negative for dizziness, focal weakness, headaches, 
88 y/o F PMH of COPD, HTN, HLD, hypothyroidism, TIA, trigeminal neuralgia, T2DM, and dementia who was BIBEMS from [facility] to the ED with an unwitnessed fall adm with acute metabolic encephalopathy 2/2 constipation, ANGELICA, and further workup for falls.
89-year-old female admitted with an unwitnessed fall found to be severely anemic.  She has evidence of mild to moderate aortic stenosis by echocardiography but no evidence for an acute coronary syndrome at present.  Her blood pressure had been low, now elevated    Recommend    - Continue close observation  - Continue antihypertensive therapy  - cont norvasc 10, and would increase losartan to 50 if BP remains elevated (she was on 50 bid during last admission)  - Continue statin therapy  - asa had been held in setting of hb drop.  - Conservative management is warranted at this time with no need for additional cardiac testing.  - will follow with you
89-year-old female admitted with an unwitnessed fall found to be severely anemic.  She has evidence of mild to moderate aortic stenosis by echocardiography but no evidence for an acute coronary syndrome at present.  Her blood pressure had been low, now elevated    Recommend    - Continue close observation  - Continue antihypertensive therapy  - increase losartan to 50  - try to get off of iv hydralazine.  can decrease to tid dosing  - Continue statin therapy  - asa had been held in setting of hb drop.  - Conservative management is warranted at this time with no need for additional cardiac testing.  - will follow with you.  Patient is hospice appropriate
89 y/o F PMH of COPD, HTN, HLD, hypothyroidism, TIA, trigeminal neuralgia, T2DM, and dementia who was BIBEMS from [facility] to the ED with an unwitnessed fall adm with acute metabolic encephalopathy 2/2 constipation, ANGELICA, and further workup for falls. Palliative consulted for goals of care.
light-headedness, loss of balance and numbness.   Psychiatric/Behavioral: Negative for altered mental status.     Objective:     Vital Signs (Most Recent):  Temp: 97.8 °F (36.6 °C) (12/17/18 1100)  Pulse: 88 (12/17/18 1100)  Resp: 18 (12/17/18 1100)  BP: 111/79 (12/17/18 1101)  SpO2: 96 % (12/17/18 1100) Vital Signs (24h Range):  Temp:  [97.8 °F (36.6 °C)] 97.8 °F (36.6 °C)  Pulse:  [88] 88  Resp:  [18] 18  SpO2:  [96 %] 96 %  BP: (111-123)/(79-84) 111/79          There is no height or weight on file to calculate BMI.    SpO2: 96 %  O2 Device (Oxygen Therapy): room air    Physical Exam   Constitutional: He is oriented to person, place, and time. He appears well-developed and well-nourished. No distress.   HENT:   Head: Normocephalic and atraumatic.   Mouth/Throat: No oropharyngeal exudate.       Eyes: Conjunctivae are normal. Pupils are equal, round, and reactive to light. Right eye exhibits no discharge. Left eye exhibits no discharge.   Neck: Normal range of motion. Neck supple.   Cardiovascular: Normal rate, S1 normal, S2 normal, normal heart sounds and intact distal pulses. An irregularly irregular rhythm present.  No extrasystoles are present. PMI is not displaced. Exam reveals no gallop and no friction rub.   No murmur heard.  Pulses:       Radial pulses are 2+ on the right side, and 2+ on the left side.        Dorsalis pedis pulses are 1+ on the right side, and 1+ on the left side.   Pulmonary/Chest: Effort normal and breath sounds normal. No accessory muscle usage. No respiratory distress. He has no decreased breath sounds. He has no wheezes. He has no rhonchi. He has no rales. He exhibits no tenderness.   Abdominal: Soft. Bowel sounds are normal. He exhibits no distension and no mass. There is no tenderness. There is no rebound and no guarding.   Musculoskeletal: Normal range of motion. He exhibits no edema, tenderness or deformity.        Right shoulder: He exhibits no swelling.   Neurological: He is alert 
89-year-old female admitted with an unwitnessed fall found to be severely anemic.  She has evidence of mild to moderate aortic stenosis by echocardiography but no evidence for an acute coronary syndrome at present.  Her blood pressure had been low, now elevated    Recommend    - pt not taking oral meds.   - Po antihtn stopped  - cont hydralazine iv  - asa and statin stopped  - Conservative management is warranted at this time with no need for additional cardiac testing.  - will follow with you.  Patient is hospice appropriate, and awaiting transfer to pcu
and oriented to person, place, and time. He has normal strength. He is not disoriented. No cranial nerve deficit or sensory deficit. He exhibits normal muscle tone. Coordination and gait normal.   Skin: Skin is warm and dry. No rash noted. He is not diaphoretic. No erythema.   Psychiatric: He has a normal mood and affect. His behavior is normal. Judgment and thought content normal.   Nursing note and vitals reviewed.      Significant Labs: BMP: No results for input(s): GLU, NA, K, CL, CO2, BUN, CREATININE, CALCIUM, MG in the last 48 hours., CMP: No results for input(s): NA, K, CL, CO2, GLU, BUN, CREATININE, CALCIUM, PROT, ALBUMIN, BILITOT, ALKPHOS, AST, ALT, ANIONGAP, ESTGFRAFRICA, EGFRNONAA in the last 48 hours., CBC: No results for input(s): WBC, HGB, HCT, PLT in the last 48 hours. and INR: No results for input(s): INR, PROTIME in the last 48 hours.    Significant Imaging: Cardiac Cath: **, Echocardiogram: 2D echo with color flow doppler: No results found for this or any previous visit. and Transthoracic echo (TTE) complete (Cupid Only): No results found for this or any previous visit., Ejection fraction: No results for input(s): EF in the last 168 hours. and EKG: **  
89-year-old female admitted with an unwitnessed fall found to be severely anemic.  She has evidence of mild to moderate aortic stenosis by echocardiography but no evidence for an acute coronary syndrome at present.  Her blood pressure had been low, now elevated    Recommend    - pt not taking oral meds.   - Po antihtn stopped  - cont hydralazine iv  - asa and statin stopped  - Conservative management is warranted at this time with no need for additional cardiac testing.  - will follow with you.  Patient is hospice appropriate
88 y/o F PMH of COPD, HTN, HLD, hypothyroidism, TIA, trigeminal neuralgia, T2DM, and dementia who was BIBEMS from [facility] to the ED with an unwitnessed fall adm with acute metabolic encephalopathy 2/2 constipation, ANGELICA, and further workup for falls.
88 y/o F PMH of COPD, HTN, HLD, hypothyroidism, TIA, trigeminal neuralgia, T2DM, and dementia who was BIBEMS from [facility] to the ED with an unwitnessed fall adm with acute metabolic encephalopathy 2/2 constipation, ANGELICA, and further workup for falls.
88 y/o F PMH of COPD, HTN, HLD, hypothyroidism, TIA, trigeminal neuralgia, T2DM, and dementia who was BIBEMS from [facility] to the ED with an unwitnessed fall adm with acute metabolic encephalopathy 2/2 constipation, ANGELICA, and further workup for falls.
89 y/o F PMH of COPD, HTN, HLD, hypothyroidism, TIA, trigeminal neuralgia, T2DM, and dementia who was BIBEMS from [facility] to the ED with an unwitnessed fall adm with acute metabolic encephalopathy 2/2 constipation, ANGELICA, and further workup for falls.
89 y/o F PMH of COPD, HTN, HLD, hypothyroidism, TIA, trigeminal neuralgia, T2DM, and dementia who was BIBEMS from [facility] to the ED with an unwitnessed fall adm with acute metabolic encephalopathy 2/2 constipation, ANGELICA, and further workup for falls. Palliative consulted for goals of care.
89 y/o F PMH of COPD, HTN, HLD, hypothyroidism, TIA, trigeminal neuralgia, T2DM, and dementia who was BIBEMS from [facility] to the ED with an unwitnessed fall adm with acute metabolic encephalopathy 2/2 constipation, ANGELICA, and further workup for falls.
88 y/o F PMH of COPD, HTN, HLD, hypothyroidism, TIA, trigeminal neuralgia, T2DM, and dementia who was BIBEMS from [facility] to the ED with an unwitnessed fall adm with acute metabolic encephalopathy 2/2 constipation, ANGELICA, and further workup for falls. Palliative consulted for goals of care.

## 2021-12-28 NOTE — DISCHARGE NOTE NURSING/CASE MANAGEMENT/SOCIAL WORK - PATIENT PORTAL LINK FT
You can access the FollowMyHealth Patient Portal offered by [facility] by registering at the following website: [email or web address]. By joining [facility]’s FollowMyHealth portal, you will also be able to view your health information using other applications (apps) compatible with our system.

## 2021-12-28 NOTE — PROGRESS NOTE ADULT - PROVIDER SPECIALTY LIST ADULT
Cardiology
Internal Medicine
Palliative Care
Cardiology
Cardiology
Gastroenterology
Gastroenterology
Internal Medicine
Palliative Care
Internal Medicine
Palliative Care
Palliative Care
Internal Medicine

## 2021-12-28 NOTE — PROGRESS NOTE ADULT - PROBLEM SELECTOR PROBLEM 1
Acute metabolic encephalopathy
Dyspnea
Acute metabolic encephalopathy
Dyspnea
Acute metabolic encephalopathy
Acute metabolic encephalopathy
Agitation due to dementia

## 2021-12-28 NOTE — PROGRESS NOTE ADULT - PROBLEM SELECTOR PLAN 4
- HOLDING asa/plavix given acute drop in hgb 9.8 from 12
likely multifactorial, advancing dementia, ANGELICA, ileus  delirium prevention protocol  fall precautions
per GOC discussed earlier  code status DNR/I  Joy updated, questions and concerns addressed  patient likes chocolate  may need to transition to IV meds if continues to be NPO
- HOLDING asa/plavix given acute drop in hgb 9.8 from 12
- HOLDING asa/plavix given acute drop in hgb 9.8 from 12
likely multifactorial, advancing dementia, ANGELICA, ileus  delirium prevention protocol  fall precautions
- HOLDING asa/plavix given acute drop in hgb 9.8 from 12
at least FAST 7C  updated daughter Joy about prognosis  nil oral now due to mentation, waxing and waning
- HOLDING asa/plavix given acute drop in hgb 9.8 from 12
- HOLDING asa/plavix given acute drop in hgb 9.8 from 12

## 2021-12-28 NOTE — PROGRESS NOTE ADULT - PROBLEM SELECTOR PROBLEM 2
Acute metabolic encephalopathy
Constipation
Functional quadriplegia
Constipation
Functional quadriplegia
Constipation

## 2021-12-28 NOTE — DISCHARGE NOTE PROVIDER - DETAILS OF MALNUTRITION DIAGNOSIS/DIAGNOSES
This patient has been assessed with a concern for Malnutrition and was treated during this hospitalization for the following Nutrition diagnosis/diagnoses:     -  12/21/2021: Severe protein-calorie malnutrition

## 2021-12-28 NOTE — DISCHARGE NOTE PROVIDER - CARE PROVIDER_API CALL
[facility]  [address] , [place] [address]  Please continue with your care at [facility]patient [facility]ospice  Phone: [phone]  Fax: (   )    -  Follow Up Time:

## 2021-12-28 NOTE — DISCHARGE NOTE PROVIDER - HOSPITAL COURSE
88 y/o F PMH of COPD, HTN, HLD, hypothyroidism, TIA, trigeminal neuralgia, T2DM, and dementia admitted with an unwitnessed fall adm with acute metabolic encephalopathy 2/2 constipation and ANGELICA.  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 treated for HTN urgency. Palliative consulted for goals of care. The patient was transferred to the PCU for symptom management. The patient is safe and stable for discharge to inpatient hospice at [facility]. Plan discussed with the family.

## 2021-12-28 NOTE — HOSPICE CARE NOTE - CONVESATION DETAILS
-	At this time, due to critical shortage staffing in the area, [facility] is unable to provide the best services needed for the patient in question. We suggest the referral sent to other hospices if hospice is the patients/families desired d/c plan. SW informed son Elias and will contact [facility].  Jessie Campos ADAM  [phone]  
Patient approved for inpatient hospice, PCG son Elias accepting bed for today 12/28 at [facility] for in patient hospice. PCU SW aware and transport set up 4pm . [facility], [facility], hospital, and family aware of and agree with plan.   Jessie Campos LMSW

## 2021-12-28 NOTE — PROGRESS NOTE ADULT - PROBLEM SELECTOR PROBLEM 5
ACP (advance care planning)
HTN (hypertension)
Advanced dementia
HTN (hypertension)
HTN (hypertension)
Palliative care encounter
HTN (hypertension)
Constipation
HTN (hypertension)
HTN (hypertension)

## 2021-12-28 NOTE — PROGRESS NOTE ADULT - PROBLEM SELECTOR PLAN 1
suspect 2/2 dehydration/ANGELICA, constipation and delerium superimposed on dementia  CT w/ stool impaction s/p ED manual disimpaction with some stool  infectious workup neg - UA,CXR, CTAP neg for infection  - management of constipation as below  -   unwitnessed fall  - fall precautions  drop in hb - transfuse with close monitor of hb closely  ct abd pelvis to r/o retroperitoneal bleed
Likely terminal delirium  advanced dementia  ativan provides relief  start ativan 0.5 mg q6H ATC  start ativan 1 mg q1H PRN
suspect 2/2 dehydration/ANGELICA, constipation and delerium superimposed on dementia  CT w/ stool impaction s/p ED manual disimpaction with some stool  infectious workup neg - UA,CXR, CTAP neg for infection  - management of constipation as below  -   unwitnessed fall  - fall precautions  drop in hb - transfuse with close monitor of hb closely  ct abd pelvis neg for retroperitoneal bleed
likely multifactorial, advancing dementia, ANGELICA, ileus  delirium prevention protocol  fall precautions
suspect 2/2 dehydration/ANGELICA, constipation and delerium superimposed on dementia  CT w/ stool impaction s/p ED manual disimpaction with some stool  infectious workup neg - UA,CXR, CTAP neg for infection  - management of constipation as below  -   unwitnessed fall  - fall precautions  drop in hb - transfuse with close monitor of hb closely  ct abd pelvis neg for retroperitoneal bleed
suspect 2/2 dehydration/ANGELICA, constipation and delerium superimposed on dementia  CT w/ stool impaction s/p ED manual disimpaction with some stool  infectious workup neg - UA,CXR, CTAP neg for infection  - management of constipation as below  -   unwitnessed fall  - fall precautions  drop in hb - transfuse with close monitor of hb closely  ct abd pelvis to r/o retroperitoneal bleed
- Continue with Dilaudid 0.2mg IV q6hr ATC ordered  - Continue with Dilaudid 0.5mg IV q1hr PRN ( 0 required within a 24hr period 8am-8am)  - Bowel regimen while on opioids
The patient required Morphine 1mg IV X5 within a 24hr period 8am-8am. Creatine 1.92 d/c Morphine and start Dilaudid.   - Dilaudid 0.2mgIV q6hr ATC ordered  - Dilaudid 0.5mg IV q1hr PRN  - Bowel regimen while on opioids

## 2021-12-28 NOTE — PROGRESS NOTE ADULT - PROBLEM SELECTOR PROBLEM 3
Fall at home
Agitation due to dementia
Constipation
Fall at home
Advanced dementia
Fall at home
Agitation due to dementia
Fall at home

## 2021-12-28 NOTE — PROGRESS NOTE ADULT - PROBLEM SELECTOR PLAN 2
PPSV 10%. The patient requires nursing assistance with all ADLs  supportive care  Turn and position  Continue with good skin care
S/P disimpaction in the ED, no bowel obstruction seen on CT  - miralax BID  -=
change bowel regimen to PRN  resolving ileus per imaging  had 2 BMs yesterday  unable to take PO anymore
S/P disimpaction in the ED, no bowel obstruction seen on CT  - miralax BID  -=
likely multifactorial, advancing dementia, ANGELICA, ileus  delirium prevention protocol  fall precautions
S/P disimpaction in the ED, no bowel obstruction seen on CT  - miralax BID  -=
PPSV 10%. The patient requires nursing assistance with all ADLs  supportive care  Turn and position  Continue with good skin care

## 2021-12-28 NOTE — PROGRESS NOTE ADULT - PROBLEM SELECTOR PLAN 6
Patient appropriate for inpatient hospice  Ongoing dispo planning with the   Will continue with symptom management in the PCU
resolved, a1c 5.6 recently  - monitor, no need for fs checking
emotional support provided  [place] [phone]  [place] [phone]  [place] [phone]
resolved, a1c 5.6 recently  - monitor, no need for fs checking
resolved, a1c 5.6 recently  - monitor, no need for fs checking
at least FAST 7C  Supportive care
resolved, a1c 5.6 recently  - monitor, no need for fs checking

## 2021-12-28 NOTE — PROGRESS NOTE ADULT - PROBLEM SELECTOR PLAN 5
HX of HTN urgency last admission  on admission hypotensive to 80s  - resume bp meds tomorrow with hold parameters - on losartan 25 mg po daily (lowered then BID dosing of candesartan at home)  - norvasc 10 mg daily  - hold hydralazine
emotional support provided  [place] [phone]  [place] [phone]  [place] [phone]
at least FAST 7C  Supportive care
per GOC discussed earlier  code status DNR/I  Joy updated, questions and concerns addressed  patient likes chocolate  no oral route
titrate bp meds for optimal bp control
Resolved. Last recorded BM on 12/25/21. Abdomen soft. +BS  - Dulcolax 10mg suppository PRN  - resolving ileus per imaging  - unable to take PO anymore
titrate bp meds for optimal bp control
HX of HTN urgency last admission  on admission hypotensive to 80s  - resume bp meds tomorrow with hold parameters - on losartan 25 mg po daily (lowered then BID dosing of candesartan at home)  - norvasc 10 mg daily  - hold hydralazine

## 2021-12-28 NOTE — PROGRESS NOTE ADULT - SUBJECTIVE AND OBJECTIVE BOX
GAP TEAM PALLIATIVE CARE UNIT PROGRESS NOTE:      [  ] Patient on hospice program.    INDICATION FOR PALLIATIVE CARE UNIT SERVICES: symptom management in the setting of a 88y/o F PMH of HTN, hypothyroidism TIA trigeminal neuralgia dementia admitted with metabolic encephalopathy     INTERVAL HPI/OVERNIGHT EVENTS: Chart reviewed. The patient is seen and examined at the bedside. She did not require any PRN medications within the last 24hr period 8am-8am    DNR on chart:   Allergies    Ambien (Unknown)  aspirin (Unknown)  sulfa drugs (Unknown)    Intolerances    MEDICATIONS  (STANDING):  HYDROmorphone  Injectable 0.2 milliGRAM(s) IV Push every 6 hours  levothyroxine Injectable 70 MICROGram(s) IV Push at bedtime  LORazepam   Injectable 1 milliGRAM(s) IV Push every 6 hours    MEDICATIONS  (PRN):  acetaminophen  Suppository .. 650 milliGRAM(s) Rectal every 6 hours PRN Temp greater or equal to 38C (100.4F)  bisacodyl Suppository 10 milliGRAM(s) Rectal daily PRN Constipation  hydrALAZINE Injectable 5 milliGRAM(s) IV Push every 6 hours PRN hypertension  HYDROmorphone  Injectable 0.5 milliGRAM(s) IV Push every 1 hour PRN pain  HYDROmorphone  Injectable 0.5 milliGRAM(s) IV Push every 1 hour PRN dyspneaPlease notify the provider if the patient requires 4 or more PRN within a 24hr period  LORazepam   Injectable 1 milliGRAM(s) IV Push every 1 hour PRN Anxiety and/or refractory dyspnea    ITEMS UNCHECKED ARE NOT PRESENT    PRESENT SYMPTOMS: [X ]Unable to obtain due to poor mentation   Source if other than patient:  [ ]Family   [ X]Team     Pain: [ ] yes [X ] no please see pain ad score  QOL impact -   Location -                    Aggravating factors -  Quality -  Radiation -  Timing-  Severity (0-10 scale):  Minimal acceptable level (0-10 scale):     Dyspnea:                           [ ]Mild [ ]Moderate [ ]Severe  Anxiety:                             [ ]Mild [ ]Moderate [ ]Severe  Fatigue:                             [ ]Mild [ ]Moderate [ ]Severe  Nausea:                             [ ]Mild [ ]Moderate [ ]Severe  Loss of appetite:              [ ]Mild [ ]Moderate [ ]Severe  Constipation:                    [ ]Mild [ ]Moderate [ ]Severe    PAINAD Score: 0    [email or web address].[email or web address].[facility]/cog/painad.pdf (Ctrl +  left click to view)  		  Other Symptoms:  [ ]All other review of systems negative- Unable to assess    Palliative Performance Status Version 2: 10 %         http://[facility] include [facility] the [facility]/files/news/palliative_performance_scale_[email or web address].pdf  PHYSICAL EXAM:  Vital Signs Last 24 Hrs  T(C): 36.8 (28 Dec 2021 07:45), Max: 36.8 (28 Dec 2021 07:45)  T(F): 98.2 (28 Dec 2021 07:45), Max: 98.2 (28 Dec 2021 07:45)  HR: 97 (28 Dec 2021 07:45) (97 - 97)  BP: 92/59 (28 Dec 2021 07:45) (92/59 - 92/59)  BP(mean): --  RR: 12 (28 Dec 2021 07:45) (12 - 12)  SpO2: 92% (28 Dec 2021 07:45) (92% - 92%) I&O's Summary    GENERAL:  [ ]Alert  [ ]Oriented x   [ ]Lethargic  [ ]Cachexia  [ X]Unarousable  [ ]Verbal  [X ]Non-Verbal  Behavioral:   [ ] Anxiety  [ ] Delirium [ ] Agitation [ ] Other  HEENT:  [ ]Normal   [ ]Dry mouth   [ ]ET Tube/Trach  [ ]Oral lesions  PULMONARY:   [ ]Clear [ ]Tachypnea  [ ]Audible excessive secretions   [ ]Rhonchi        [ ]Right [ ]Left [ ]Bilateral  [ ]Crackles        [ ]Right [ ]Left [ ]Bilateral  [ ]Wheezing     [ ]Right [ ]Left [ ]Bilateral  [ X]Diminshed BS [ ]Right [ ]Left [X ]Bilateral    CARDIOVASCULAR:    [X ]Regular [ ]Irregular [ ]Tachy  [ ]Hamlet [ ]Murmur [ ]Other  GASTROINTESTINAL:  [ X]Soft  [X ]Distended   [ X]+BS  [X ]Non tender [ ]Tender  [ ]PEG [ ]OGT/ NGT   Last BM:  12/25/21  GENITOURINARY:  [ ]Normal [ X] Incontinent   [ ]Oliguria/Anuria   [ ]Hendricks [X]Other- external catheter   MUSCULOSKELETAL:   [ ]Normal   [X ]Weakness  [ ]Bed/Wheelchair bound [ ]Edema  NEUROLOGIC:   [ ]No focal deficits  [X ] Cognitive impairment  [ ] Dysphagia [ ]Dysarthria [ ] Paresis [ ]Other   SKIN:   [ ]Normal  [ ]Rash     [X ]Pressure ulcer(s)  [X ]y [ ]n  Present on admission  b/l heel stage 1 and Sacrum stage 1. Please see nursing assessment for further details for which I have reviewed.     CRITICAL CARE:  [ ] Shock Present  [ ]Septic [ ]Cardiogenic [ ]Neurologic [ ]Hypovolemic  [ ]  Vasopressors [ ]  Inotropes   [ ] Respiratory failure present [ ] Mechanical Ventilation [ ] Non-invasive ventilatory support [ ] High-Flow  [ ] Acute  [ ] Chronic [ ] Hypoxic  [ ] Hypercarbic [ ] Other  [ ] Other organ failure     LABS:  None new    RADIOLOGY & ADDITIONAL STUDIES: None new    PROTEIN CALORIE MALNUTRITION: [ ] mild [ ] moderate [ X] severe- please see the nutritionist note  [ ] underweight [ ] morbid obesity    https://www.andeal.org/vault/2440/web/files/ONC/Table_Clinical%20Characteristics%20to%20Document%20Malnutrition-White%20JV%20et%20al%950421.pdf    Height (cm): 160 (12-18-21 @ 20:11), 160 (11-28-21 @ 19:21), 160 (10-26-21 @ 21:52)  Weight (kg): 53.5 (12-18-21 @ 20:11), 63.5 (11-28-21 @ 19:21), 56.7 (10-26-21 @ 21:52)  BMI (kg/m2): 20.9 (12-18-21 @ 20:11), 24.8 (11-28-21 @ 19:21), 22.1 (10-26-21 @ 21:52)    [X ] PPSV2 < or = 30% [ ] significant weight loss [ ] poor nutritional intake [ ] anasarca   Artificial Nutrition [ ]     REFERRALS:   [ ]Chaplaincy  [X ] Hospice  [ ]Child Life  [X ]Social Work  [ ]Case management [ ]Holistic Therapy [ ] Physical Therapy [ ] Dietary   Goals of Care Document:    GAP TEAM PALLIATIVE CARE UNIT PROGRESS NOTE:      [  ] Patient on hospice program.    INDICATION FOR PALLIATIVE CARE UNIT SERVICES: symptom management in the setting of a 88y/o F PMH of HTN, hypothyroidism TIA trigeminal neuralgia dementia admitted with metabolic encephalopathy     INTERVAL HPI/OVERNIGHT EVENTS: Chart reviewed. The patient is seen and examined at the bedside. She did not require any PRN medications within the last 24hr period 8am-8am    DNR on chart:   Allergies    Ambien (Unknown)  aspirin (Unknown)  sulfa drugs (Unknown)    Intolerances    MEDICATIONS  (STANDING):  HYDROmorphone  Injectable 0.2 milliGRAM(s) IV Push every 6 hours  levothyroxine Injectable 70 MICROGram(s) IV Push at bedtime  LORazepam   Injectable 1 milliGRAM(s) IV Push every 6 hours    MEDICATIONS  (PRN):  acetaminophen  Suppository .. 650 milliGRAM(s) Rectal every 6 hours PRN Temp greater or equal to 38C (100.4F)  bisacodyl Suppository 10 milliGRAM(s) Rectal daily PRN Constipation  hydrALAZINE Injectable 5 milliGRAM(s) IV Push every 6 hours PRN hypertension  HYDROmorphone  Injectable 0.5 milliGRAM(s) IV Push every 1 hour PRN pain  HYDROmorphone  Injectable 0.5 milliGRAM(s) IV Push every 1 hour PRN dyspneaPlease notify the provider if the patient requires 4 or more PRN within a 24hr period  LORazepam   Injectable 1 milliGRAM(s) IV Push every 1 hour PRN Anxiety and/or refractory dyspnea    ITEMS UNCHECKED ARE NOT PRESENT    PRESENT SYMPTOMS: [X ]Unable to obtain due to poor mentation   Source if other than patient:  [ ]Family   [ X]Team     Pain: [ ] yes [X ] no please see pain ad score  QOL impact -   Location -                    Aggravating factors -  Quality -  Radiation -  Timing-  Severity (0-10 scale):  Minimal acceptable level (0-10 scale):     Dyspnea:                           [ ]Mild [ ]Moderate [ ]Severe  Anxiety:                             [ ]Mild [ ]Moderate [ ]Severe  Fatigue:                             [ ]Mild [ ]Moderate [ ]Severe  Nausea:                             [ ]Mild [ ]Moderate [ ]Severe  Loss of appetite:              [ ]Mild [ ]Moderate [ ]Severe  Constipation:                    [ ]Mild [ ]Moderate [ ]Severe    PAINAD Score: 0    http://geriatrictoolkit.[place].[facility]/cog/painad.pdf (Ctrl +  left click to view)  		  Other Symptoms:  [ ]All other review of systems negative- Unable to assess    Palliative Performance Status Version 2: 10 %         http://[facility].org/files/news/palliative_performance_scale_[email or web address].pdf  PHYSICAL EXAM:  Vital Signs Last 24 Hrs  T(C): 36.8 (28 Dec 2021 07:45), Max: 36.8 (28 Dec 2021 07:45)  T(F): 98.2 (28 Dec 2021 07:45), Max: 98.2 (28 Dec 2021 07:45)  HR: 97 (28 Dec 2021 07:45) (97 - 97)  BP: 92/59 (28 Dec 2021 07:45) (92/59 - 92/59)  BP(mean): --  RR: 12 (28 Dec 2021 07:45) (12 - 12)  SpO2: 92% (28 Dec 2021 07:45) (92% - 92%) I&O's Summary    GENERAL:  [ ]Alert  [ ]Oriented x   [ ]Lethargic  [ ]Cachexia  [X]Unarousable  [ ]Verbal  [X]Non-Verbal  Behavioral:   [ ] Anxiety  [ ] Delirium [ ] Agitation [ ] Other  HEENT:  [ ]Normal   [ ]Dry mouth   [ ]ET Tube/Trach  [ ]Oral lesions  PULMONARY:   [ ]Clear [ ]Tachypnea  [ ]Audible excessive secretions   [ ]Rhonchi        [ ]Right [ ]Left [ ]Bilateral  [ ]Crackles        [ ]Right [ ]Left [ ]Bilateral  [ ]Wheezing     [ ]Right [ ]Left [ ]Bilateral  [ X]Diminished BS [ ]Right [ ]Left [X ]Bilateral    CARDIOVASCULAR:    [X ]Regular [ ]Irregular [ ]Tachy  [ ]Hamlet [ ]Murmur [ ]Other  GASTROINTESTINAL:  [ X]Soft  [X ]Distended   [ X]+BS  [X ]Non tender [ ]Tender  [ ]PEG [ ]OGT/ NGT   Last BM:  12/25/21  GENITOURINARY:  [ ]Normal [ X] Incontinent   [ ]Oliguria/Anuria   [ ]Hendricks [X]Other- external catheter   MUSCULOSKELETAL:   [ ]Normal   [X ]Weakness  [ ]Bed/Wheelchair bound [ ]Edema  NEUROLOGIC:   [ ]No focal deficits  [X ] Cognitive impairment  [ ] Dysphagia [ ]Dysarthria [ ] Paresis [ ]Other   SKIN:   [ ]Normal  [ ]Rash     [X ]Pressure ulcer(s)  [X ]y [ ]n  Present on admission  b/l heel stage 1 and Sacrum stage 1. Please see nursing assessment for further details for which I have reviewed.     CRITICAL CARE:  [ ] Shock Present  [ ]Septic [ ]Cardiogenic [ ]Neurologic [ ]Hypovolemic  [ ]  Vasopressors [ ]  Inotropes   [ ] Respiratory failure present [ ] Mechanical Ventilation [ ] Non-invasive ventilatory support [ ] High-Flow  [ ] Acute  [ ] Chronic [ ] Hypoxic  [ ] Hypercarbic [ ] Other  [x ] Other organ failure brain    LABS:  None new    RADIOLOGY & ADDITIONAL STUDIES: None new    PROTEIN CALORIE MALNUTRITION: [ ] mild [ ] moderate [ X] severe- please see the nutritionist note  [ ] underweight [ ] morbid obesity    https://www.andeal.org/vault/2440/web/files/ONC/Table_Clinical%20Characteristics%20to%20Document%20Malnutrition-White%20JV%20et%20al%977115.pdf    Height (cm): 160 (12-18-21 @ 20:11), 160 (11-28-21 @ 19:21), 160 (10-26-21 @ 21:52)  Weight (kg): 53.5 (12-18-21 @ 20:11), 63.5 (11-28-21 @ 19:21), 56.7 (10-26-21 @ 21:52)  BMI (kg/m2): 20.9 (12-18-21 @ 20:11), 24.8 (11-28-21 @ 19:21), 22.1 (10-26-21 @ 21:52)    [X ] PPSV2 < or = 30% [ ] significant weight loss [ ] poor nutritional intake [ ] anasarca   Artificial Nutrition [ ]     REFERRALS:   [ ]Chaplaincy  [X ] Hospice  [ ]Child Life  [X ]Social Work  [ ]Case management [ ]Holistic Therapy [ ] Physical Therapy [ ] Dietary   Goals of Care Document:   Goals of Care:    GOALS OF CARE:  · Participants	Patient; Family  · Child(dale)	Joy Griffin     Conversation Discussion:  · Conversation	Diagnosis; Prognosis; MOLST Discussed; Treatment Options  · Conversation Details	Patient unable to participate in goals of care discussion due to cognitive impairment in setting of advanced dementia. Goals of care discussion with patient's children Joy and Elias. Bryan unavailable at this time. Joy and Elias shared that patient has advanced dementia and has been in memory unit at [facility]. We discussed patient's overall condition and goals of care. They shared that patient would not want artifical nutrition and their goal is to keep her as comfortable as possible. Elias stated he has been working to transfer patient to [facility] where hospice services are available. They are in agreement with hospice referral. Confirmed DNR/DNI. MOLST was completed by primary team. All questions answered and emotional support provided.     Treatment Guidelines:  · Decision Maker	Surrogate  · Treatment Guidelines	DNR Order; Comfort measures only     GAP TEAM PALLIATIVE CARE UNIT PROGRESS NOTE:      [  ] Patient on hospice program.    INDICATION FOR PALLIATIVE CARE UNIT SERVICES: symptom management in the setting of a 89y/o F PMH of HTN, hypothyroidism TIA trigeminal neuralgia dementia admitted with metabolic encephalopathy     INTERVAL HPI/OVERNIGHT EVENTS: Chart reviewed. The patient is seen and examined at the bedside. She did not require any PRN medications within the last 24hr period 8am-8am    DNR on chart:   Allergies    Ambien (Unknown)  aspirin (Unknown)  sulfa drugs (Unknown)    Intolerances    MEDICATIONS  (STANDING):  HYDROmorphone  Injectable 0.2 milliGRAM(s) IV Push every 6 hours  levothyroxine Injectable 70 MICROGram(s) IV Push at bedtime  LORazepam   Injectable 1 milliGRAM(s) IV Push every 6 hours    MEDICATIONS  (PRN):  acetaminophen  Suppository .. 650 milliGRAM(s) Rectal every 6 hours PRN Temp greater or equal to 38C (100.4F)  bisacodyl Suppository 10 milliGRAM(s) Rectal daily PRN Constipation  hydrALAZINE Injectable 5 milliGRAM(s) IV Push every 6 hours PRN hypertension  HYDROmorphone  Injectable 0.5 milliGRAM(s) IV Push every 1 hour PRN pain  HYDROmorphone  Injectable 0.5 milliGRAM(s) IV Push every 1 hour PRN dyspneaPlease notify the provider if the patient requires 4 or more PRN within a 24hr period  LORazepam   Injectable 1 milliGRAM(s) IV Push every 1 hour PRN Anxiety and/or refractory dyspnea    ITEMS UNCHECKED ARE NOT PRESENT    PRESENT SYMPTOMS: [X ]Unable to obtain due to poor mentation   Source if other than patient:  [ ]Family   [ X]Team     Pain: [ ] yes [X ] no please see pain ad score  QOL impact -   Location -                    Aggravating factors -  Quality -  Radiation -  Timing-  Severity (0-10 scale):  Minimal acceptable level (0-10 scale):     Dyspnea:                           [ ]Mild [ ]Moderate [ ]Severe  Anxiety:                             [ ]Mild [ ]Moderate [ ]Severe  Fatigue:                             [ ]Mild [ ]Moderate [ ]Severe  Nausea:                             [ ]Mild [ ]Moderate [ ]Severe  Loss of appetite:              [ ]Mild [ ]Moderate [ ]Severe  Constipation:                    [ ]Mild [ ]Moderate [ ]Severe    PAINAD Score: 0    [email or web address].[email or web address].[facility]/cog/painad.pdf (Ctrl +  left click to view)  		  Other Symptoms:  [ ]All other review of systems negative- Unable to assess    Palliative Performance Status Version 2: 10 %         http://[facility].org/files/news/palliative_performance_scale_[email or web address].pdf  PHYSICAL EXAM:  Vital Signs Last 24 Hrs  T(C): 36.8 (28 Dec 2021 07:45), Max: 36.8 (28 Dec 2021 07:45)  T(F): 98.2 (28 Dec 2021 07:45), Max: 98.2 (28 Dec 2021 07:45)  HR: 97 (28 Dec 2021 07:45) (97 - 97)  BP: 92/59 (28 Dec 2021 07:45) (92/59 - 92/59)  BP(mean): --  RR: 12 (28 Dec 2021 07:45) (12 - 12)  SpO2: 92% (28 Dec 2021 07:45) (92% - 92%) I&O's Summary    GENERAL:  [ ]Alert  [ ]Oriented x   [ ]Lethargic  [ ]Cachexia  [X]Unarousable  [ ]Verbal  [X]Non-Verbal  Behavioral:   [ ] Anxiety  [ ] Delirium [ ] Agitation [ ] Other  HEENT:  [ ]Normal   [ ]Dry mouth   [ ]ET Tube/Trach  [ ]Oral lesions  PULMONARY:   [ ]Clear [ ]Tachypnea  [ ]Audible excessive secretions   [ ]Rhonchi        [ ]Right [ ]Left [ ]Bilateral  [ ]Crackles        [ ]Right [ ]Left [ ]Bilateral  [ ]Wheezing     [ ]Right [ ]Left [ ]Bilateral  [ X]Diminished BS [ ]Right [ ]Left [X ]Bilateral    CARDIOVASCULAR:    [X ]Regular [ ]Irregular [ ]Tachy  [ ]Hamlet [ ]Murmur [ ]Other  GASTROINTESTINAL:  [ X]Soft  [X ]Distended   [ X]+BS  [X ]Non tender [ ]Tender  [ ]PEG [ ]OGT/ NGT   Last BM:  12/28/21  GENITOURINARY:  [ ]Normal [ X] Incontinent   [ ]Oliguria/Anuria   [ ]Hendricks [X]Other- external catheter   MUSCULOSKELETAL:   [ ]Normal   [X ]Weakness  [ ]Bed/Wheelchair bound [ ]Edema  NEUROLOGIC:   [ ]No focal deficits  [X ] Cognitive impairment  [ ] Dysphagia [ ]Dysarthria [ ] Paresis [ ]Other   SKIN:   [ ]Normal  [ ]Rash     [X ]Pressure ulcer(s)  [X ]y [ ]n  Present on admission  b/l heel stage 1 and Sacrum stage 1. Please see nursing assessment for further details for which I have reviewed.     CRITICAL CARE:  [ ] Shock Present  [ ]Septic [ ]Cardiogenic [ ]Neurologic [ ]Hypovolemic  [ ]  Vasopressors [ ]  Inotropes   [ ] Respiratory failure present [ ] Mechanical Ventilation [ ] Non-invasive ventilatory support [ ] High-Flow  [ ] Acute  [ ] Chronic [ ] Hypoxic  [ ] Hypercarbic [ ] Other  [x ] Other organ failure brain    LABS:  None new    RADIOLOGY & ADDITIONAL STUDIES: None new    PROTEIN CALORIE MALNUTRITION: [ ] mild [ ] moderate [ X] severe- please see the nutritionist note  [ ] underweight [ ] morbid obesity    https://www.andeal.org/vault/2440/web/files/ONC/Table_Clinical%20Characteristics%20to%20Document%20Malnutrition-White%20JV%20et%20al%560235.pdf    Height (cm): 160 (12-18-21 @ 20:11), 160 (11-28-21 @ 19:21), 160 (10-26-21 @ 21:52)  Weight (kg): 53.5 (12-18-21 @ 20:11), 63.5 (11-28-21 @ 19:21), 56.7 (10-26-21 @ 21:52)  BMI (kg/m2): 20.9 (12-18-21 @ 20:11), 24.8 (11-28-21 @ 19:21), 22.1 (10-26-21 @ 21:52)    [X ] PPSV2 < or = 30% [ ] significant weight loss [ ] poor nutritional intake [ ] anasarca   Artificial Nutrition [ ]     REFERRALS:   [ ]Chaplaincy  [X ] Hospice  [ ]Child Life  [X ]Social Work  [ ]Case management [ ]Holistic Therapy [ ] Physical Therapy [ ] Dietary   Goals of Care Document:   Goals of Care:    GOALS OF CARE:  · Participants	Patient; Family  · Child(dale)	Joy Griffin     Conversation Discussion:  · Conversation	Diagnosis; Prognosis; MOLST Discussed; Treatment Options  · Conversation Details	Patient unable to participate in goals of care discussion due to cognitive impairment in setting of advanced dementia. Goals of care discussion with patient's children Joy and Elias. Bryan unavailable at this time. Joy and Elias shared that patient has advanced dementia and has been in memory unit at [facility]. We discussed patient's overall condition and goals of care. They shared that patient would not want artifical nutrition and their goal is to keep her as comfortable as possible. Elias stated he has been working to transfer patient to [facility] where hospice services are available. They are in agreement with hospice referral. Confirmed DNR/DNI. MOLST was completed by primary team. All questions answered and emotional support provided.     Treatment Guidelines:  · Decision Maker	Surrogate  · Treatment Guidelines	DNR Order; Comfort measures only

## 2021-12-28 NOTE — DISCHARGE NOTE PROVIDER - NSDCMRMEDTOKEN_GEN_ALL_CORE_FT
acetaminophen 650 mg rectal suppository: 1 suppository(ies) rectal every 6 hours, As needed, Temp greater or equal to 38C (100.4F)  bisacodyl 10 mg rectal suppository: 1 suppository(ies) rectal once a day, As needed, Constipation  hydrALAZINE 20 mg/mL injectable solution: 5 milligram(s) intravenous every 6 hours, As Needed  HYDROmorphone: 0.5 milligram(s) intravenous every hour, As Needed  HYDROmorphone: 0.5 milligram(s) intravenous every hour, As Needed  HYDROmorphone: 0.2 milligram(s) intravenous every 6 hours  levothyroxine: 70 microgram(s) intravenous once (at bedtime)  LORazepam: 1 milligram(s) intravenous every hour, As Needed  LORazepam: 1 milligram(s) intravenous every 6 hours   acetaminophen 650 mg rectal suppository: 1 suppository(ies) rectal every 6 hours, As needed, Temp greater or equal to 38C (100.4F)  bisacodyl 10 mg rectal suppository: 1 suppository(ies) rectal once a day, As needed, Constipation  glycopyrrolate 0.2 mg/mL injectable solution: 0.4 milligram(s) intravenous every 6 hours, As Needed  hydrALAZINE 20 mg/mL injectable solution: 5 milligram(s) intravenous every 6 hours, As Needed  HYDROmorphone: 0.5 milligram(s) intravenous every hour, As Needed  HYDROmorphone: 0.5 milligram(s) intravenous every hour, As Needed  HYDROmorphone: 0.2 milligram(s) intravenous every 6 hours  levothyroxine: 70 microgram(s) intravenous once (at bedtime)  LORazepam: 1 milligram(s) intravenous every hour, As Needed  LORazepam: 1 milligram(s) intravenous every 6 hours

## 2021-12-28 NOTE — PROGRESS NOTE ADULT - REASON FOR ADMISSION
unwitnessed fall

## 2021-12-28 NOTE — DISCHARGE NOTE PROVIDER - PROVIDER TOKENS
FREE:[LAST:[Chen],FIRST:[Inpatient Hospice],PHONE:[phone]],FAX:[(   )    -],ADDRESS:[[address] [place] [id number]  Please continue with your care at Inpatient Hospice]]

## 2022-01-01 NOTE — CONSULT NOTE ADULT - CONSULT REQUESTED BY NAME
Problem: Skin Integrity  Goal: Skin integrity is maintained or improved (skin will be intact without erythma or breakdown)  Outcome: Outcome Met, Continue evaluating goal progress toward completion  Skin is free of redness and/or breakdown. Will continue to monitor.        
Dr Márquez
Dr. Márquez
Parent
Dr. Márquez

## 2022-09-27 NOTE — DISCHARGE NOTE ADULT - PATIENT PORTAL LINK FT
You can access the [facility] Portal, offered by [facility], by registering with the following website: [email or web address]

## 2022-10-21 NOTE — PHYSICAL THERAPY INITIAL EVALUATION ADULT - ACTIVE RANGE OF MOTION EXAMINATION, REHAB EVAL
bilateral  lower extremity Active ROM was WFL (within functional limits)/bilateral upper extremity Active ROM was WFL (within functional limits) 87

## 2022-11-30 NOTE — PHYSICAL THERAPY INITIAL EVALUATION ADULT - GENERAL OBSERVATIONS, REHAB EVAL
Pt received in bed, asleep, barely able to open eyes. As per RN Magdalena, pt admitted middle of night and very out of it right now, attempt eval again tomorrow. Pt found sitting OOB in chair with 1:1 present and SD/CCU monitors. Pt with no c/o pain. Never

## 2023-07-19 NOTE — PROVIDER CONTACT NOTE (CRITICAL VALUE NOTIFICATION) - PERSON GIVING RESULT:
Made patient aware of annotation below, patient understood she agreed that we will call her after her imaging to see who she needs to be scheduled with. Patient understood and thankful. Appointment cancelled.   
My chart message sent to patient. Will hold to verify message is read.   
Patient completed diagnostic breast imaging today and was notified of results by this Radiology RN due to being ordered under Franchesca Good. Please see final report.    Current patient of [facility]. Please review results and notify your patient regarding plan of care and recommended follow up.     Thank you,  Radiology RN  
Patient has follow-up imaging already scheduled for June 21.  Her appointment is currently scheduled for June 19.  Please have her cancel this appointment and we will follow-up with her to reschedule accordingly thank you  
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 Given her high risk and multiple bilateral breast lesions I will go ahead and order an MRI now and see her in March.  03/20/23 and 04/24/23 appointments were cancelled by patient.     06/19/23 office visit was cancelled due to having ultrasound and mammogram scheduled for today, orders were placed by Dr.Kelly Good.     Sent to provider for review of results and recommendation for follow up.   
Per patient's my chart message. Last read by Yesica Maldondao at  4:20 PM on 7/19/2023.    
Please order Breast MRI for August of 2023 and have her follow up with me or Enma Abdullahi CNP for clinical breast exam. Her US shows stable breast masses that need follow up imaging in January.   
Please order Breast MRI for August of 2023 and have her follow up with me or Enma Abdullahi CNP for clinical breast exam. Her US shows stable breast masses that need follow up imaging in January.     Sent to provider for for clarification. MRI imaging ordered for August 2023. What follow up imaging should be ordered for January or will this be discussed at a follow up after imaging appointment after MRI?   
This will be discussed at her follow up in August  
Micheal Solo
lab
lab
[facility]
Zuleyma Sweet

## 2023-10-26 NOTE — ED ADULT NURSE NOTE - CAS TRG GENERAL NORM CIRC DET
Problem: Adult Inpatient Plan of Care  Goal: Optimal Comfort and Wellbeing  Outcome: Progressing     Problem: Substance Withdrawal  Goal: Substance Withdrawal  Description: Signs and symptoms of listed problems will be absent or manageable.  Outcome: Progressing   Goal Outcome Evaluation:    Plan of Care Reviewed With: patient      The patient remained visible and active throughout the evening until bedtime, engaging in card games with peers in the lounge. Despite active participation and socialization, the patient expressed feelings of anxiety. However, the patient denied depression, AVHs, poor appetite, constipation, or poor sleep patterns and had no suicidal or homicidal thoughts. The patient exhibited a calm mood, a sense of humor, and a laid-back demeanor. Based on the Modified Selective Severity Assessment (MSSA), the patient scored 2 and 2, and the Clinical Opiate Withdrawal Scale (COWS) was 1 and 1. The patient demonstrated compliance with prescribed medication and showed no adverse reactions. Pt continues on Suboxone and Phenobarbitol.                  Left another message for patient to call us to send in new scripts  Strong peripheral pulses

## 2024-09-19 NOTE — PROGRESS NOTE ADULT - EXTREMITIES
No cyanosis, clubbing or edema If you are a smoker, it is important for your health to stop smoking. Please be aware that second hand smoke is also harmful.

## 2025-03-21 NOTE — CONSULT NOTE ADULT - PROBLEM SELECTOR RECOMMENDATION 9
multifactorial etiology  baseline A&O x0 in setting of advanced dementia multifactorial etiology  management per primary team  baseline A&O x0 in setting of advanced dementia 21-Mar-2025 02:53

## 2025-07-09 NOTE — ED ADULT NURSE NOTE - DISCHARGE DATE/TIME
Physician Discharge Summary     Patient ID:  Aminata Glover  [id number]  86 y.o.  1938    Admit date: 7/8/2025    Discharge date and time: 07/09/25 5:49 AM     Admitting Physician: Gertrudis Sheikh MD     Admission Diagnoses: Gallstones [K80.20]  S/P laparoscopic cholecystectomy [Z90.49]    Discharge Diagnoses: Principal Problem:    Gallstones  Active Problems:    S/P laparoscopic cholecystectomy  Resolved Problems:    * No resolved hospital problems. *      Admission Condition: good    Discharged Condition: stable    Indication for Admission: observation post-op    Hospital Course/Procedures/Operation/treatments:   Patient presented on 7/8 for an elective robotic assisted laparoscopic cholecystectomy for history of biliary colic.  She was subsequently admitted as she does not have anyone to take care of her at home for the next 24 hours.  She progressed well, pain was controlled on p.o. medications, tolerating regular diet.  She is stable for discharge home with follow-up in the office.    Consults:   IP CONSULT TO SPIRITUAL SERVICES    Significant Diagnostic Studies:   No results found.    Discharge Exam:  General: No acute distress  Respiratory: No accessory muscle use, normal respiratory effort  Cardiovascular: Warm and well-perfused  Abdominal: Soft, nondistended, appropriately tender to palpation, incisions are C/D/I    Disposition: home    In process/preliminary results:  Outstanding Order Results       No orders found for last 30 day(s).            Patient Instructions:   Current Discharge Medication List             Details   HYDROcodone-acetaminophen (NORCO) 5-325 MG per tablet Take 1 tablet by mouth every 4 hours as needed for Pain for up to 3 days. Intended supply: 3 days. Take lowest dose possible to manage pain Max Daily Amount: 6 tablets  Qty: 18 tablet, Refills: 0    Comments: Reduce doses taken as pain becomes manageable  Associated Diagnoses: Postoperative pain                Details  respiratory complications and low grade fevers post-operatively.  You may want to hug a pillow when coughing and sneezing to add additional support to the surgical area(s) which will decrease pain during these times.  You are encouraged to walk and engage in light activity for the next two weeks.  You should not lift more than 20 pounds during this time frame as it could put you at increased risk for a post-operative hernia.  Twenty pounds is roughly equivalent to a plastic bag of groceries.       WOUND/DRESSING INSTRUCTIONS:  Always ensure you and your care giver clean hands before and after caring for the wound.  [x]May shower      []May bathe      [x]Derma bond dressing-Do not apply lotion, gel, or liquid to wound while the derma bond is in place.   [x]Other - Keep a dry clean dressing on the wound if there is drainage. Dermabond dressing will fall off in 1-2 weeks.  If clothing rubs against the wound or causes irritation and the wound is not draining you may cover it with a dry dressing during the daytime.  Try to keep the wound dry and avoid ointments on the wound unless directed to do so.  If the wound becomes bright red and painful or starts to drain infected material that is not clear, please contact your physician immediately.   You should also call if you begin to drain fluid that is thin and greenish-brown from the wound and appears to look like bile.  If the wound though is mildly pink and has a thick firm ridge underneath it, this is normal, and is referred to as a healing ridge.  This will resolve over the next 4-6 weeks.          MEDICATION INSTRUCTIONS:    [x]Prescriptions sent with you.  Use as directed.  When taking pain medications, you may experience dizziness or drowsiness.  Do not drink alcohol or drive when taking these medications.  [x]You may take a non-prescription “headache remedy”, preferably one that does not contain aspirin.    Other Instructions:      FOLLOW-UP CARE:  [x]Call the  03-Oct-2017 16:20